# Patient Record
Sex: MALE | Race: OTHER | Employment: OTHER | URBAN - METROPOLITAN AREA
[De-identification: names, ages, dates, MRNs, and addresses within clinical notes are randomized per-mention and may not be internally consistent; named-entity substitution may affect disease eponyms.]

---

## 2017-01-16 ENCOUNTER — HOSPITAL ENCOUNTER (OUTPATIENT)
Dept: LAB | Age: 52
Discharge: HOME | End: 2017-01-16
Payer: COMMERCIAL

## 2017-01-16 DIAGNOSIS — E29.1 HYPOGONADISM MALE: ICD-10-CM

## 2017-01-16 DIAGNOSIS — E78.00 HIGH CHOLESTEROL: ICD-10-CM

## 2017-01-16 LAB
ALBUMIN SERPL BCP-MCNC: 4 G/DL (ref 3.4–5)
ALBUMIN/GLOB SERPL: 1.5 {RATIO} (ref 0.8–1.7)
ALP SERPL-CCNC: 55 U/L (ref 45–117)
ALT SERPL-CCNC: 32 U/L (ref 16–61)
ANION GAP BLD CALC-SCNC: 11 MMOL/L (ref 3–18)
AST SERPL W P-5'-P-CCNC: 19 U/L (ref 15–37)
BASOPHILS # BLD AUTO: 0.1 K/UL (ref 0–0.06)
BASOPHILS # BLD: 2 % (ref 0–2)
BILIRUB SERPL-MCNC: 0.6 MG/DL (ref 0.2–1)
BUN SERPL-MCNC: 16 MG/DL (ref 7–18)
BUN/CREAT SERPL: 18 (ref 12–20)
CALCIUM SERPL-MCNC: 8.7 MG/DL (ref 8.5–10.1)
CHLORIDE SERPL-SCNC: 105 MMOL/L (ref 100–108)
CHOLEST SERPL-MCNC: 190 MG/DL
CO2 SERPL-SCNC: 23 MMOL/L (ref 21–32)
CREAT SERPL-MCNC: 0.87 MG/DL (ref 0.6–1.3)
DIFFERENTIAL METHOD BLD: ABNORMAL
EOSINOPHIL # BLD: 0.4 K/UL (ref 0–0.4)
EOSINOPHIL NFR BLD: 5 % (ref 0–5)
ERYTHROCYTE [DISTWIDTH] IN BLOOD BY AUTOMATED COUNT: 12.8 % (ref 11.6–14.5)
GLOBULIN SER CALC-MCNC: 2.7 G/DL (ref 2–4)
GLUCOSE SERPL-MCNC: 88 MG/DL (ref 74–99)
HCT VFR BLD AUTO: 41.7 % (ref 36–48)
HDLC SERPL-MCNC: 62 MG/DL (ref 40–60)
HDLC SERPL: 3.1 {RATIO} (ref 0–5)
HGB BLD-MCNC: 13.9 G/DL (ref 13–16)
LDLC SERPL CALC-MCNC: 67.2 MG/DL (ref 0–100)
LIPID PROFILE,FLP: ABNORMAL
LYMPHOCYTES # BLD AUTO: 24 % (ref 21–52)
LYMPHOCYTES # BLD: 2 K/UL (ref 0.9–3.6)
MCH RBC QN AUTO: 30 PG (ref 24–34)
MCHC RBC AUTO-ENTMCNC: 33.3 G/DL (ref 31–37)
MCV RBC AUTO: 90.1 FL (ref 74–97)
MONOCYTES # BLD: 0.7 K/UL (ref 0.05–1.2)
MONOCYTES NFR BLD AUTO: 9 % (ref 3–10)
NEUTS SEG # BLD: 4.8 K/UL (ref 1.8–8)
NEUTS SEG NFR BLD AUTO: 60 % (ref 40–73)
PLATELET # BLD AUTO: 266 K/UL (ref 135–420)
PMV BLD AUTO: 11.5 FL (ref 9.2–11.8)
POTASSIUM SERPL-SCNC: 4.2 MMOL/L (ref 3.5–5.5)
PROT SERPL-MCNC: 6.7 G/DL (ref 6.4–8.2)
PSA SERPL-MCNC: 0.4 NG/ML (ref 0–4)
RBC # BLD AUTO: 4.63 M/UL (ref 4.7–5.5)
SODIUM SERPL-SCNC: 139 MMOL/L (ref 136–145)
TRIGL SERPL-MCNC: 304 MG/DL (ref ?–150)
VLDLC SERPL CALC-MCNC: 60.8 MG/DL
WBC # BLD AUTO: 8 K/UL (ref 4.6–13.2)

## 2017-01-16 PROCEDURE — 80061 LIPID PANEL: CPT | Performed by: INTERNAL MEDICINE

## 2017-01-16 PROCEDURE — 36415 COLL VENOUS BLD VENIPUNCTURE: CPT | Performed by: INTERNAL MEDICINE

## 2017-01-16 PROCEDURE — 84403 ASSAY OF TOTAL TESTOSTERONE: CPT | Performed by: INTERNAL MEDICINE

## 2017-01-16 PROCEDURE — 80053 COMPREHEN METABOLIC PANEL: CPT | Performed by: INTERNAL MEDICINE

## 2017-01-16 PROCEDURE — 85025 COMPLETE CBC W/AUTO DIFF WBC: CPT | Performed by: INTERNAL MEDICINE

## 2017-01-16 PROCEDURE — 84153 ASSAY OF PSA TOTAL: CPT | Performed by: INTERNAL MEDICINE

## 2017-01-17 LAB
COMMENT, TESC2: ABNORMAL
TESTOST SERPL-MCNC: 165 NG/DL (ref 348–1197)

## 2017-01-18 ENCOUNTER — OFFICE VISIT (OUTPATIENT)
Dept: INTERNAL MEDICINE CLINIC | Age: 52
End: 2017-01-18

## 2017-01-18 VITALS
HEART RATE: 96 BPM | DIASTOLIC BLOOD PRESSURE: 74 MMHG | OXYGEN SATURATION: 98 % | RESPIRATION RATE: 20 BRPM | HEIGHT: 77 IN | SYSTOLIC BLOOD PRESSURE: 142 MMHG | WEIGHT: 262 LBS | TEMPERATURE: 98 F | BODY MASS INDEX: 30.94 KG/M2

## 2017-01-18 DIAGNOSIS — E78.5 DYSLIPIDEMIA: Primary | ICD-10-CM

## 2017-01-18 DIAGNOSIS — R07.81 RIB PAIN: ICD-10-CM

## 2017-01-18 DIAGNOSIS — E29.1 HYPOGONADISM MALE: ICD-10-CM

## 2017-01-18 DIAGNOSIS — M54.2 NECK PAIN: ICD-10-CM

## 2017-01-18 DIAGNOSIS — F17.200 TOBACCO DEPENDENCE: ICD-10-CM

## 2017-01-18 RX ORDER — VARENICLINE TARTRATE 25 MG
KIT ORAL
Qty: 1 DOSE PACK | Refills: 0 | Status: SHIPPED | OUTPATIENT
Start: 2017-01-18 | End: 2019-03-14 | Stop reason: SDUPTHER

## 2017-01-18 NOTE — PROGRESS NOTES
Patient is in the office today for a 3 month follow up. Patient states he is having some back and rib cage pain. Do you have an Advance Directive no  Do you want more information declined    1. Have you been to the ER, urgent care clinic since your last visit? Hospitalized since your last visit? No    2. Have you seen or consulted any other health care providers outside of the [facility]\"" since your last visit? Include any pap smears or colon screening.  No
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
## 2017-01-18 NOTE — PATIENT INSTRUCTIONS

## 2017-01-18 NOTE — MR AVS SNAPSHOT
Visit Information Date & Time Provider Department Dept. Phone Encounter #  
 1/18/2017  8:15 AM Huber Bo MD [facility] [employer] [phone] Follow-up Instructions Return in about 3 months (around 4/18/2017) for labs 1 week before. Upcoming Health Maintenance Date Due DTaP/Tdap/Td series (2 - Td) 8/6/2015 INFLUENZA AGE 9 TO ADULT 8/1/2016 Pneumococcal 19-64 Medium Risk (1 of 1 - PPSV23) 1/31/2017* *Topic was postponed. The date shown is not the original due date. Allergies as of 1/18/2017  Review Complete On: 1/18/2017 By: Huber Bo MD  
 No Known Allergies Current Immunizations  Never Reviewed Name Date Influenza Vaccine Whole 8/6/2010 TDAP Vaccine 8/6/2005 Not reviewed this visit You Were Diagnosed With   
  
 Codes Comments Rib pain    -  Primary ICD-10-CM: R07.81 ICD-9-CM: 786.50 Neck pain     ICD-10-CM: M54.2 ICD-9-CM: 723.1 Tobacco dependence     ICD-10-CM: R83.731 ICD-9-CM: 305.1 Hypogonadism male     ICD-10-CM: E29.1 ICD-9-CM: 257.2 Vitals BP Pulse Temp Resp Height(growth percentile) Weight(growth percentile) 142/74 (BP 1 Location: Left arm, BP Patient Position: Sitting) 96 98 °F (36.7 °C) (Oral) 20 6' 5\" (1.956 m) 262 lb (118.8 kg) SpO2 BMI Smoking Status 98% 31.07 kg/m2 Current Every Day Smoker Vitals History BMI and BSA Data Body Mass Index Body Surface Area 31.07 kg/m 2 2.54 m 2 Preferred Pharmacy Pharmacy Name Phone [address], [address] [place] [address] [phone] Your Updated Medication List  
  
   
This list is accurate as of: 1/18/17  8:56 AM.  Always use your most recent med list.  
  
  
  
  
 NASACORT AQ 55 mcg nasal inhaler Generic drug:  triamcinolone 2 Sprays daily. omeprazole 40 mg capsule Commonly known as:  PRILOSEC  
take 1 capsule by mouth once daily * testosterone 4 mg/24 hr Pt24 Commonly known as:  ANDRODERM  
1 Patch by TransDERmal route daily. Max Daily Amount: 1 Patch. * testosterone 1 % (25 mg/2.5gram) Glpk Commonly known as:  ANDROGEL  
1 Packet by TransDERmal route daily. Max Daily Amount: 25 mg.  
  
 tiZANidine 4 mg tablet Commonly known as:  Barbie Perfect Take 1 Tab by mouth every six (6) hours as needed. varenicline 0.5 mg (11)- 1 mg (42) Dspk Commonly known as:  CHANTIX STARTER MARGA Per Dose pack instructions * Notice: This list has 2 medication(s) that are the same as other medications prescribed for you. Read the directions carefully, and ask your doctor or other care provider to review them with you. Prescriptions Sent to Pharmacy Refills  
 varenicline (CHANTIX STARTER MARGA) 0.5 mg (11)- 1 mg (42) DsPk 0 Sig: Per Dose pack instructions Class: Normal  
 Pharmacy: [facility] #: [phone] We Performed the Following REFERRAL TO PHYSICAL THERAPY [HON63 Custom] Follow-up Instructions Return in about 3 months (around 4/18/2017) for labs 1 week before. To-Do List   
 05/18/2017 Imaging:  XR RIBS BI 3 V   
  
 05/18/2017 Imaging:  XR SPINE CERV 4 OR 5 V Referral Information Referral ID Referred By Referred To  
  
 [id number] JONATHAN HORN Not Available Visits Status Start Date End Date 1 New Request 1/18/17 1/18/18 If your referral has a status of pending review or denied, additional information will be sent to support the outcome of this decision. Patient Instructions Heart-Healthy Diet: Care Instructions Your Care Instructions A heart-healthy diet has lots of vegetables, fruits, nuts, beans, and whole grains, and is low in salt. It limits foods that are high in saturated fat, such as meats, cheeses, and fried foods.  It may be hard to change your diet, but even small changes can lower your risk of heart attack and heart disease. Follow-up care is a key part of your treatment and safety. Be sure to make and go to all appointments, and call your doctor if you are having problems. It's also a good idea to know your test results and keep a list of the medicines you take. How can you care for yourself at home? Watch your portions · Learn what a serving is. A \"serving\" and a \"portion\" are not always the same thing. Make sure that you are not eating larger portions than are recommended. For example, a serving of pasta is ½ cup. A serving size of meat is 2 to 3 ounces. A 3-ounce serving is about the size of a deck of cards. Measure serving sizes until you are good at [place]" them. Keep in mind that restaurants often serve portions that are 2 or 3 times the size of one serving. · To keep your energy level up and keep you from feeling hungry, eat often but in smaller portions. · Eat only the number of calories you need to stay at a healthy weight. If you need to lose weight, eat fewer calories than your body burns (through exercise and other physical activity). Eat more fruits and vegetables · Eat a variety of fruit and vegetables every day. Dark green, deep orange, red, or yellow fruits and vegetables are especially good for you. Examples include spinach, carrots, peaches, and berries. · Keep carrots, celery, and other veggies handy for snacks. Buy fruit that is in season and store it where you can see it so that you will be tempted to eat it. · Cook dishes that have a lot of veggies in them, such as stir-fries and soups. Limit saturated and trans fat · Read food labels, and try to avoid saturated and trans fats. They increase your risk of heart disease. Trans fat is found in many processed foods such as cookies and crackers. · Use olive or canola oil when you cook. Try cholesterol-lowering spreads, such as Benecol or Take Control. · Bake, broil, grill, or steam foods instead of frying them. · Choose lean meats instead of high-fat meats such as hot dogs and sausages. Cut off all visible fat when you prepare meat. · Eat fish, skinless poultry, and meat alternatives such as soy products instead of high-fat meats. Soy products, such as tofu, may be especially good for your heart. · Choose low-fat or fat-free milk and dairy products. Eat fish · Eat at least two servings of fish a week. Certain fish, such as salmon and tuna, contain omega-3 fatty acids, which may help reduce your risk of heart attack. Eat foods high in fiber · Eat a variety of grain products every day. Include whole-grain foods that have lots of fiber and nutrients. Examples of whole-grain foods include oats, whole wheat bread, and brown rice. · Buy whole-grain breads and cereals, instead of white bread or pastries. Limit salt and sodium · Limit how much salt and sodium you eat to help lower your blood pressure. · Taste food before you salt it. Add only a little salt when you think you need it. With time, your taste buds will adjust to less salt. · Eat fewer snack items, fast foods, and other high-salt, processed foods. Check food labels for the amount of sodium in packaged foods. · Choose low-sodium versions of canned goods (such as soups, vegetables, and beans). Limit sugar · Limit drinks and foods with added sugar. These include candy, desserts, and soda pop. Limit alcohol · Limit alcohol to no more than 2 drinks a day for men and 1 drink a day for women. Too much alcohol can cause health problems. When should you call for help? Watch closely for changes in your health, and be sure to contact your doctor if: 
· You would like help planning heart-healthy meals. Where can you learn more? Go to http://deni-freddy.info/. Enter V137 in the search box to learn more about \"Heart-Healthy Diet: Care Instructions. \" 
 Current as of: January 27, 2016 Content Version: 11.1 © 3246-8234 [employer]. Care instructions adapted under license by [employer] (which disclaims liability or warranty for this information). If you have questions about a medical condition or this instruction, always ask your healthcare professional. [address] any warranty or liability for your use of this information. Introducing [facility]! Desiree Tera introduces Youjia patient portal. Now you can access parts of your medical record, email your doctor's office, and request medication refills online. 1. In your internet browser, go to [email or web address] 2. Click on the First Time User? Click Here link in the Sign In box. You will see the New Member Sign Up page. 3. Enter your Youjia Access Code exactly as it appears below. You will not need to use this code after youve completed the sign-up process. If you do not sign up before the expiration date, you must request a new code. · Youjia Access Code: [email or web address] Expires: 4/18/2017  8:28 AM 
 
4. Enter the last four digits of your Social Security Number (xxxx) and Date of Birth (mm/dd/yyyy) as indicated and click Submit. You will be taken to the next sign-up page. 5. Create a Youjia ID. This will be your Youjia login ID and cannot be changed, so think of one that is secure and easy to remember. 6. Create a Youjia password. You can change your password at any time. 7. Enter your Password Reset Question and Answer. This can be used at a later time if you forget your password. 8. Enter your e-mail address. You will receive e-mail notification when new information is available in [address] 9. Click Sign Up. You can now view and download portions of your medical record. 10. Click the Download Summary menu link to download a portable copy of your medical information. If you have questions, please visit the Frequently Asked Questions section of the [email or web address] website. Remember, Wayin is NOT to be used for urgent needs. For medical emergencies, dial 911. Now available from your iPhone and Android! Please provide this summary of care documentation to your next provider. Your primary care clinician is listed as JONATHAN HORN. If you have any questions after today's visit, please call [phone].

## 2017-01-27 ENCOUNTER — APPOINTMENT (OUTPATIENT)
Dept: PHYSICAL THERAPY | Age: 52
End: 2017-01-27

## 2017-01-31 ENCOUNTER — HOSPITAL ENCOUNTER (OUTPATIENT)
Dept: PHYSICAL THERAPY | Age: 52
Discharge: HOME | End: 2017-01-31
Payer: COMMERCIAL

## 2017-01-31 PROCEDURE — 97162 PT EVAL MOD COMPLEX 30 MIN: CPT

## 2017-01-31 PROCEDURE — 97110 THERAPEUTIC EXERCISES: CPT

## 2017-01-31 PROCEDURE — 97140 MANUAL THERAPY 1/> REGIONS: CPT

## 2017-01-31 NOTE — PROGRESS NOTES
[facility]  [address] [facility], [address] [address],[place]  [phone] fax    Plan of Care/ Statement of Necessity for Physical Therapy Services    Patient name: Dayami Enciso Start of Care: 2017   Referral source: Braydon Lora MD : [id number]    Medical Diagnosis: Cervicalgia [M54.2]   Onset Date:Approx 4 months    Treatment Diagnosis: decrease tolerance to ADLS and activities due to Neck pain B and intermittent B UE S/S, STC   Prior Hospitalization: see medical history Provider#: [id number]   Medications: Verified on Patient summary List    Comorbidities: none   Prior Level of Function: I all areas, no AD use, 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 Assessment/ key information: 55 YO male diagnosed as above and with S/S consistent with above diagnosis presents to skilled outpatient PT. CCO Neck pain, back pain , and rib pain. States something feels like it is coming from between the shoulder blades and radiates around the rib cage to his sternum. He notes the neck pain feels like it is at the base of the neck and goes up to the base of his skull. At times he notes he has S/S down the arms L>R and no particular pattern. Insideous onset reported approx 4 months ago with gradual onset and he notes a history of recurrent episodic pain that now is not going away like it used to. Previous Treatment/Compliance: none for this  Pain 3, FOTO 70 , Csp AROM FF 38   BB 35    SB B 35 and ROT R/L 42/36. Pain and tightness with full Csp retraction. AROM B UE full overhead , note FH and RS with posture, slight decrease R shoulder height. Moderate STC L>R UT and medial ST region. Distraction BAR and Compression No Better no worse.     Patient demonstrates the potential to make gains with improved ROM, strength, endurance/activity tolerance, functional FOTO survey score and all within a reasonable time frame so as to increase their functional independence with ADLs and activities for carryover to Improved quality of life, tolerance to work demands, household chores and yard work. Patient requires skilled Physical Therapy so as to monitor their response to and modify their treatment plan accordingly. Patient appears to be an appropriate candidate for skilled outpatient Physical Therapy.     Evaluation Complexity History HIGH Complexity :3+ comorbidities / personal factors will impact the outcome/ POC ; Examination HIGH Complexity : 4+ Standardized tests and measures addressing body structure, function, activity limitation and / or participation in recreation  ;Presentation MEDIUM Complexity : Evolving with changing characteristics  ; Clinical Decision Making MEDIUM Complexity : FOTO score of 26-74  Overall Complexity Rating: MEDIUM  Problem List: pain affecting function, decrease ROM, decrease ADL/ functional abilitiies, decrease activity tolerance, decrease flexibility/ joint mobility and other FOTO 70   Treatment Plan may include any combination of the following: Therapeutic exercise, Therapeutic activities, Neuromuscular re-education, Physical agent/modality, Manual therapy and Patient education  Patient / Family readiness to learn indicated by: asking questions, trying to perform skills and interest  Persons(s) to be included in education: patient (P)  Barriers to Learning/Limitations: None  Patient Goal (s): pain free   Patient Self Reported Health Status: good  Rehabilitation Potential: good    Short Term Goals:  To be accomplished in 5 treatments:   1 patient will have established and be independent with HEP to aid with progression of skilled PT program   EVAL issued   CURRENT   2 patient will have pain <2/10 to aid with increase tolerance to ADLS and activities   EVAL 3   CURRENT   3 patient will have FOTO improved to 73 to show increase tolerance to ADLs and activities   EVAL 70   CURRENT  Long Term Goals: To be accomplished in 10 treatments:    1 patient will have pain <1/10 to aid with increase tolerance to ADLS and activities   EVAL 3   CURRENT   2 patient will have FOTO improved to 76 to show increase tolerance to ADLs and activities   EVAL 70   CURRENT   3 patient will report 50% improvement overall to aid with increase tolerance to ADLs and activities   EVAL   CURRENT   4 Patient will have SB B 35-40 degrees with less pain to aid with increase tolerance to ADLs   EVAL B SB 35 with pain reported   CURRENT  Frequency / Duration: Patient to be seen 2-3 times per week for 10 treatments. Patient/ Caregiver education and instruction: Diagnosis, prognosis, self care, activity modification and exercises   [x]  Plan of care has been reviewed with YUDY Torres, PT 1/31/2017 2:49 PM  ________________________________________________________________________    I certify that the above Therapy Services are being furnished while the patient is under my care. I agree with the treatment plan and certify that this therapy is necessary.     [de-identified] Signature:____________________  Date:____________Time: _________    Please sign and return to [facility] - [facility]    [address] [facility], [address] [place] [address],[facility] [address]  [phone] fax

## 2017-01-31 NOTE — PROGRESS NOTES
PT DAILY TREATMENT NOTE/CERVICAL EVAL3-16    Patient Name: Barbara Figueroa  Date:2017  : 1965  [x]  Patient  Verified  Payor: BLUE CROSS / Plan: [employer] [facility] / Product Type: PPO /    In time:206  Out time:246  Total Treatment Time (min): 40  Total Timed Codes (min): 18  1:1 Treatment Time ( only): 18  Visit #: 1 of 10    Treatment Area: Cervicalgia [M54.2]    SUBJECTIVE  Pain Level (0-10 scale): 3  [x]constant []intermittent [x]improving [x]worsening []no change since onset  Better with nothing, Worse with moving the neck from side to side  Any medication changes, allergies to medications, adverse drug reactions, diagnosis change, or new procedure performed?: [x] No    [] Yes (see summary sheet for update)  Subjective functional status/changes:     PLOF: I   Limitations to PLOF: nothing  Mechanism of Injury: insideous onset   Current symptoms/Complaints: 53 YO male diagnosed as above and with S/S consistent with above diagnosis presents to skilled outpatient PT. CCO Neck pain, back pain , and rib pain. States something feels like it is coming from between the shoulder blades and radiates around the rib cage to his sternum. He notes the neck pain feels like it is at the base of the neck and goes up to the base of his skull. At times he notes he has S/S down the arms L>R and no particular pattern. Insideous onset reported approx 4 months ago with gradual onset and he notes a history of recurrent episodic pain that now is not going away like it used to. Previous Treatment/Compliance: none for this  PMHx/Surgical Hx: None reported with exception of a fall through a roof in his 20s fracturing his sternum.   Work Hx: full time self employed   Living Situation:1 story house , 4 steps to enter , rails, not living alone  Pt Goals: painfree  Barriers: [x]pain []financial []time []transportation []other  Motivation: high  Substance use: []Alcohol [x]Tobacco []other:   FABQ Score: []low []elevate  Cognition: A & O x4    Other:    OBJECTIVE/EXAMINATION  Domestic Life: works full time, needs to tolerate household and yard work, coaching for baseball  Activity/Recreational Limitations: pain   Mobility: I no AD use  Self Care: I all areas and no Assistance needed        Modality rationale: decrease pain and increase tissue extensibility to improve the patients ability to aid with increase tolerance to ADLS and activities   Min Type Additional Details    [] Estim:  []Unatt       []IFC  []Premod                        []Other:  []w/ice   []w/heat  Position:  Location:    [] Estim: []Att    []TENS instruct  []NMES                    []Other:  []w/US   []w/ice   []w/heat  Position:  Location:     [x]  Traction: [] Cervical       []Lumbar                       [] Prone          []Supine                       []Intermittent   []Continuous Lbs:  [] before manual  [] after manual    []  Ultrasound: []Continuous   [] Pulsed                           []1MHz   []3MHz Location:  W/cm2:    []  Iontophoresis with dexamethasone         Location: [] Take home patch   [] In clinic    []  Ice     []  heat  []  Ice massage  []  Laser   []  Anodyne Position:  Location:    []  Laser with stim  []  Other: Position:  Location:    []  Vasopneumatic Device Pressure:       [] lo [] med [] hi   Temperature: [] lo [] med [] hi   [] Skin assessment post-treatment:  []intact []redness- no adverse reaction    []redness - adverse reaction:     22 min [x]Eval                  []Re-Eval       8 min Therapeutic Exercise:  [] See flow sheet :   Rationale: increase ROM and increase strength to improve the patients ability to aid with increase tolerance to ADLS and activities     min Therapeutic Activity:  []  See flow sheet :   Rationale:   to improve the patients ability to       min Neuromuscular Re-education:  []  See flow sheet :   Rationale:   to improve the patients ability to     10 min Manual Therapy:  CTX supine with SOR Csp PROM   Rationale: decrease pain, increase ROM, increase tissue extensibility and decrease trigger points to aid with increase tolerance to ADLS and activities     min Gait Training:  ___ feet with ___ device on level surfaces with ___ level of assist   Rationale: With   [] TE   [] TA   [] neuro   [] other: Patient Education: [x] Review HEP    [] Progressed/Changed HEP based on:   [] positioning   [] body mechanics   [] transfers   [] heat/ice application    [] other:      Other Objective/Functional Measures:      Physical Therapy Evaluation Cervical Spine     SUBJECTIVE  Chief Complaint:    Mechanism of injury:    Symptoms  Aggravated by:   [] Bending [] Sitting [] Standing [] Reaching Overhead   [] Moving [] Cough [] Sneeze [] Eating   [] AM  [] PM  Lying:  [] sup   [] pro   [] sidelying   [] Other:     Eased by:    [] Bending [] Sitting [] Standing Lying: [] sup  [] pro  [] sidelying   [] Moving [] AM  [] PM  [] Other:     General Health:  Red Flags Indicated? [] Yes    [x] No  [] Yes [] No Recent weight change (If yes, due to dieting? [] Yes  [] No)   [] Yes [] No Persistent cough  [] Yes [] No Unremitting pain at night  [] Yes [] No Dizziness  [] Yes [] No Blurred vision  [] Yes [] No Hands more cold or painful in cold weather  [] Yes [] No Ringing in ears  [] Yes [] No Difficulty swallowing  [] Yes [] No Dysfunction of bowel or bladder  [] Yes [] No Recent illness within past 3 weeks (i.e, cold, flu)  [] Yes [] No Jaw pain    Past History/Treatments:    Diagnostic Tests: [] Lab work [x] X-rays  Pending   [] CT [] MRI     [] Other:  Results:    Functional Status  Prior level of function:  Present functional limitations:  What position do you sleep in?:    Headaches: Do you have headaches? [] Yes   [x] No  How often do you get headaches? How long does the headache last?  What aggravates it? What relieves it?   Does the headache coincide with any other symptoms (visual disturbances, light sensitivity)? Where is the headache? Does it change locations?   Other:    OBJECTIVE  Posture: [] WNL  Head Position:    FH, RS  Shoulder/Scapular Position:  C-Kyphosis:  [] increased   [] decreased   C-Lordosis:   [] increased   [] decreased  T-Kyphosis:  [] increased   [] decreased  T-Lordosis:   [] increased   [] decreased     TMJ: [x] N/A [] Abnormal - ROM:   Palpation:    Cervical Retraction: [] WNL    [x] Abnormal:able to perform with reports of pain/tightness at the base of the spine    Shoulder/Scapular Screen: [] WNL    [] Abnormal:    Active Movements: [] N/A   [] Too acute   [] Other:  ROM   AROM  degrees % PROM Comments:pain, area   Forward flexion 38     Extension 35     SB right 35     SB left  35     Rotation right 42     Rotation left 36       Thoracic Spine: [x] N/A    [] WNL   [] Other:    PROM:    Palpation:  [] Min  [x] Mod  [] Severe    Location:  STC L>R UT and medial ST region  [] Min  [] Mod  [] Severe    Location:  [] Min  [] Mod  [] Severe    Location:    Neuro Screen (myotome/dematome/felexes): [] WNL  Myotome Level Muscle Test Myotome Level Muscle Test   C5 Shoulder Adduction - Deltoid C8 Finger Flexors   C6 Wrist Extension T1 Finger Abduction - Interossei   C7 Elbow Extension     Comments:  Upper Limb Tension Tests: [] N/A       Ulnar: [] R    [] L    [] +    [] -       Median: [] R    [] L    [] +    [] -       Radial: [] R    [] L    [] +    [] -    Special Tests:  Cervical:        Vertebral Artery:  [] R    [] L    [] +    [] -       Alar Ligament: [] R    [] L    [] +    [] -       Transverse Lig: [] R    [] L    [] +    [] -       Spurling's:  [] R    [] L    [] +    [] -       Distraction: [] R    [] L    [] +    [] -   BAR       Compression: [] R    [] L    [] +    [] -   No worse, but a \"weird feeling like my equilibrium is off\"     Thoracic Outlet Tests: [] N/A       Adson's:  [] R    [] L    [] +    [] -       Hyperabduction: [] R    [] L    [] +    [] -       Vincent's:  [] R [] L    [] +    [] -       Shantelaumarcus Luke:  [] R    [] L    [] +    [] -    Diaphragmatic Breathing: [] Normal    [] Abnormal    Muscle Flexibility: [] N/A   Scalenes: [] WNL    [] Tight    [] R    [] L   Upper Trap: [] WNL    [] Tight    [] R    [] L   Levator: [] WNL    [] Tight    [] R    [] L   Pect. Minor: [] WNL    [] Tight    [] R    [] L    Global Muscular Weakness: [] N/A   Lower Trap:   Rhomboids:   Middle Trap:   Serratus Ant:   Ext Rotators: Other:    Other tests/comments:       Pain Level (0-10 scale) post treatment: 3    ASSESSMENT/Changes in Function: Patient demonstrates the potential to make gains with improved ROM, strength, endurance/activity tolerance, functional FOTO survey score  and all within a reasonable time frame so as to increase their functional independence with ADLs and activities for carryover to  Improved quality of life, tolerance to work demands, household chores and yard work. Patient requires skilled Physical Therapy so as to monitor their response to and modify their treatment plan accordingly. Patient appears to be an appropriate candidate for skilled outpatient Physical Therapy. Patient will continue to benefit from skilled PT services to modify and progress therapeutic interventions, address functional mobility deficits, address ROM deficits, address strength deficits, analyze and address soft tissue restrictions, analyze and cue movement patterns, analyze and modify body mechanics/ergonomics, assess and modify postural abnormalities and instruct in home and community integration to attain remaining goals.      [x]  See Plan of Care  []  See progress note/recertification  []  See Discharge Summary         Progress towards goals / Updated goals:       PLAN  [x]  Upgrade activities as tolerated     [x]  Continue plan of care  []  Update interventions per flow sheet       []  Discharge due to:_  []  Other:_      Destiny Medrano, PT 1/31/2017  2:07 PM

## 2017-02-06 ENCOUNTER — HOSPITAL ENCOUNTER (OUTPATIENT)
Dept: PHYSICAL THERAPY | Age: 52
Discharge: HOME | End: 2017-02-06
Payer: COMMERCIAL

## 2017-02-06 PROCEDURE — 97140 MANUAL THERAPY 1/> REGIONS: CPT

## 2017-02-06 PROCEDURE — 97110 THERAPEUTIC EXERCISES: CPT

## 2017-02-06 NOTE — PROGRESS NOTES
PT DAILY TREATMENT NOTE     Patient Name: Sandor Phoenix  Date:2017  : 1965  [x]  Patient  Verified  Payor: BLUE CROSS / Plan: [employer] [facility] / Product Type: PPO /    In time:230  Out time:322  Total Treatment Time (min): 52  Visit #: 2 of 10    Treatment Area: Cervicalgia [M54.2]    SUBJECTIVE  Pain Level (0-10 scale): 0  Any medication changes, allergies to medications, adverse drug reactions, diagnosis change, or new procedure performed?: [x] No    [] Yes (see summary sheet for update)  Subjective functional status/changes:   [] No changes reported  Doing better.     OBJECTIVE    Modality rationale: increase tissue extensibility to improve the patients ability to aid with increase tolerance to ADLS and activities   Min Type Additional Details    [] Estim:  []Unatt       []IFC  []Premod                        []Other:  []w/ice   []w/heat  Position:  Location:    [] Estim: []Att    []TENS instruct  []NMES                    []Other:  []w/US   []w/ice   []w/heat  Position:  Location:    []  Traction: [] Cervical       []Lumbar                       [] Prone          []Supine                       []Intermittent   []Continuous Lbs:  [] before manual  [] after manual    []  Ultrasound: []Continuous   [] Pulsed                           []1MHz   []3MHz W/cm2:  Location:    []  Iontophoresis with dexamethasone         Location: [] Take home patch   [] In clinic   10 [x]  Ice  post   []  heat  []  Ice massage  []  Laser   []  Anodyne Position:supine  Location:Csp    []  Laser with stim  []  Other:  Position:  Location:    []  Vasopneumatic Device Pressure:       [] lo [] med [] hi   Temperature: [] lo [] med [] hi   [] Skin assessment post-treatment:  []intact []redness- no adverse reaction    []redness - adverse reaction:       min []Eval                  []Re-Eval       27 min Therapeutic Exercise:  [] See flow sheet :   Rationale: increase ROM, increase strength and improve coordination to improve the patients ability to aid with increase tolerance to ADLs and activities     min Therapeutic Activity:  []  See flow sheet :   Rationale:   to improve the patients ability to       min Neuromuscular Re-education:  []  See flow sheet :   Rationale:   to improve the patients ability to     15 min Manual Therapy:  CTX SOR  Csp PROM all planes, passive UT and LI stretches in supine   Rationale: decrease pain, increase ROM, increase tissue extensibility and decrease trigger points to aid with increase tolerance to ADLs      min Gait Training:  ___ feet with ___ device on level surfaces with ___ level of assist   Rationale: With   [] TE   [] TA   [] neuro   [] other: Patient Education: [x] Review HEP    [] Progressed/Changed HEP based on:   [] positioning   [] body mechanics   [] transfers   [] heat/ice application    [] other:      Other Objective/Functional Measures: VC all exercises and techniques    Pain Level (0-10 scale) post treatment:0    ASSESSMENT/Changes in Function: first full day back and tolerated well. Patient will continue to benefit from skilled PT services to modify and progress therapeutic interventions, address functional mobility deficits, address ROM deficits, address strength deficits, analyze and address soft tissue restrictions, analyze and cue movement patterns, analyze and modify body mechanics/ergonomics, assess and modify postural abnormalities and instruct in home and community integration to attain remaining goals. [x]  See Plan of Care  []  See progress note/recertification  []  See Discharge Summary         Progress towards goals / Updated goals:  Short Term Goals:  To be accomplished in 5 treatments:  1 patient will have established and be independent with HEP to aid with progression of skilled PT program  EVAL issued  CURRENT progressing 2/6/17  2 patient will have pain <2/10 to aid with increase tolerance to ADLS and activities  EVAL 3  CURRENT 0 2/6/17  3 patient will have FOTO improved to 73 to show increase tolerance to ADLs and activities  EVAL 70  CURRENT  Long Term Goals:  To be accomplished in 10 treatments:  1 patient will have pain <1/10 to aid with increase tolerance to ADLS and activities  EVAL 3  CURRENT 0 2/6/17  2 patient will have FOTO improved to 76 to show increase tolerance to ADLs and activities  EVAL 70  CURRENT  3 patient will report 50% improvement overall to aid with increase tolerance to ADLs and activities  EVAL  CURRENT  4 Patient will have SB B 35-40 degrees with less pain to aid with increase tolerance to ADLs  EVAL B SB 35 with pain reported  CURRENT    PLAN  [x]  Upgrade activities as tolerated     [x]  Continue plan of care  []  Update interventions per flow sheet       []  Discharge due to:_  []  Other:_      Keely Lozada PT 2/6/2017  2:26 PM    Future Appointments  Date Time Provider Sandra Garcia   2/6/2017 2:30 PM Keely Lozada PT [facility]   2/9/2017 12:00 PM Sayra Perdomo PTA [facility]   4/11/2017 8:05 AM MD LAINE Hernandez None   4/18/2017 8:30 AM MD LAINE Hernandez None

## 2017-03-28 NOTE — PROGRESS NOTES
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 He had residual pain 0. He had exercises for her HEP. He should follow up with his MD as needed. Thank you. Venus Malone will have established and be independent with HEP to aid with progression of skilled PT program  Status at last note/certification:eval  Status at discharge: met    Goal:patient will have pain <2/10 to aid with increase tolerance to ADLS and activities  Status at last note/certification:eval  Status at discharge: met    Venus Malone will have FOTO improved to 73 to show increase tolerance to ADLs and activities  Status at last note/certification:eval  Status at discharge: not met, not reassessed. Goal:patient will have pain <1/10 to aid with increase tolerance to ADLS and activities  Status at last note/certification:eval  Status at discharge: met      ASSESSMENT/RECOMMENDATIONS:  []Discontinue therapy progressing towards or have reached established goals  []Discontinue therapy due to lack of appreciable progress towards goals  [x]Discontinue therapy due to lack of attendance or compliance  [x]Other:patient was busy with family things at last point of contact. He did not return for any additional sessions.   Thank you for this referral.     Soy Dodge, PT 3/28/2017 4:14 PM

## 2017-06-06 ENCOUNTER — HOSPITAL ENCOUNTER (OUTPATIENT)
Dept: LAB | Age: 52
Discharge: HOME | End: 2017-06-06
Payer: COMMERCIAL

## 2017-06-06 DIAGNOSIS — E29.1 HYPOGONADISM MALE: ICD-10-CM

## 2017-06-06 DIAGNOSIS — E78.5 DYSLIPIDEMIA: ICD-10-CM

## 2017-06-06 LAB
ALBUMIN SERPL BCP-MCNC: 4.1 G/DL (ref 3.4–5)
ALBUMIN/GLOB SERPL: 1.4 {RATIO} (ref 0.8–1.7)
ALP SERPL-CCNC: 47 U/L (ref 45–117)
ALT SERPL-CCNC: 50 U/L (ref 16–61)
ANION GAP BLD CALC-SCNC: 10 MMOL/L (ref 3–18)
AST SERPL W P-5'-P-CCNC: 23 U/L (ref 15–37)
BILIRUB SERPL-MCNC: 0.4 MG/DL (ref 0.2–1)
BUN SERPL-MCNC: 16 MG/DL (ref 7–18)
BUN/CREAT SERPL: 18 (ref 12–20)
CALCIUM SERPL-MCNC: 9 MG/DL (ref 8.5–10.1)
CHLORIDE SERPL-SCNC: 102 MMOL/L (ref 100–108)
CHOLEST SERPL-MCNC: 189 MG/DL
CO2 SERPL-SCNC: 25 MMOL/L (ref 21–32)
CREAT SERPL-MCNC: 0.87 MG/DL (ref 0.6–1.3)
GLOBULIN SER CALC-MCNC: 2.9 G/DL (ref 2–4)
GLUCOSE SERPL-MCNC: 85 MG/DL (ref 74–99)
HDLC SERPL-MCNC: 55 MG/DL (ref 40–60)
HDLC SERPL: 3.4 {RATIO} (ref 0–5)
LDLC SERPL CALC-MCNC: 61.4 MG/DL (ref 0–100)
LIPID PROFILE,FLP: ABNORMAL
POTASSIUM SERPL-SCNC: 4.2 MMOL/L (ref 3.5–5.5)
PROT SERPL-MCNC: 7 G/DL (ref 6.4–8.2)
SODIUM SERPL-SCNC: 137 MMOL/L (ref 136–145)
TRIGL SERPL-MCNC: 363 MG/DL (ref ?–150)
VLDLC SERPL CALC-MCNC: 72.6 MG/DL

## 2017-06-06 PROCEDURE — 36415 COLL VENOUS BLD VENIPUNCTURE: CPT | Performed by: INTERNAL MEDICINE

## 2017-06-06 PROCEDURE — 80061 LIPID PANEL: CPT | Performed by: INTERNAL MEDICINE

## 2017-06-06 PROCEDURE — 84403 ASSAY OF TOTAL TESTOSTERONE: CPT | Performed by: INTERNAL MEDICINE

## 2017-06-06 PROCEDURE — 80053 COMPREHEN METABOLIC PANEL: CPT | Performed by: INTERNAL MEDICINE

## 2017-06-07 LAB
COMMENT, TESC2: ABNORMAL
TESTOST SERPL-MCNC: 155 NG/DL (ref 348–1197)

## 2017-06-13 ENCOUNTER — TELEPHONE (OUTPATIENT)
Dept: INTERNAL MEDICINE CLINIC | Age: 52
End: 2017-06-13

## 2017-06-13 ENCOUNTER — OFFICE VISIT (OUTPATIENT)
Dept: INTERNAL MEDICINE CLINIC | Age: 52
End: 2017-06-13

## 2017-06-13 VITALS
TEMPERATURE: 97.9 F | BODY MASS INDEX: 30.58 KG/M2 | DIASTOLIC BLOOD PRESSURE: 75 MMHG | RESPIRATION RATE: 20 BRPM | SYSTOLIC BLOOD PRESSURE: 117 MMHG | OXYGEN SATURATION: 97 % | HEIGHT: 77 IN | HEART RATE: 49 BPM | WEIGHT: 259 LBS

## 2017-06-13 DIAGNOSIS — K21.9 GASTROESOPHAGEAL REFLUX DISEASE WITHOUT ESOPHAGITIS: ICD-10-CM

## 2017-06-13 DIAGNOSIS — E78.2 MIXED HYPERLIPIDEMIA: ICD-10-CM

## 2017-06-13 DIAGNOSIS — E29.1 HYPOGONADISM MALE: Primary | ICD-10-CM

## 2017-06-13 RX ORDER — OMEPRAZOLE 40 MG/1
CAPSULE, DELAYED RELEASE ORAL
Qty: 90 CAP | Refills: 2 | Status: SHIPPED | OUTPATIENT
Start: 2017-06-13 | End: 2018-04-01 | Stop reason: SDUPTHER

## 2017-06-13 RX ORDER — TESTOSTERONE 10 MG/.5G
2 GEL, METERED TOPICAL DAILY
Qty: 60 G | Refills: 1 | Status: SHIPPED | OUTPATIENT
Start: 2017-06-13 | End: 2017-10-30 | Stop reason: SDUPTHER

## 2017-06-13 NOTE — MR AVS SNAPSHOT
Visit Information Date & Time Provider Department Dept. Phone Encounter #  
 6/13/2017  8:45 AM Maya Swanson MD [facility] [phone] Follow-up Instructions Return in about 4 months (around 10/13/2017) for labs 1 week before. Upcoming Health Maintenance Date Due Pneumococcal 19-64 Medium Risk (1 of 1 - PPSV23) 11/2/1986 DTaP/Tdap/Td series (2 - Td) 8/6/2015 INFLUENZA AGE 9 TO ADULT 8/1/2017 Allergies as of 6/13/2017  Review Complete On: 6/13/2017 By: Maya Swanson MD  
 No Known Allergies Current Immunizations  Never Reviewed Name Date Influenza Vaccine Whole 8/6/2010 TDAP Vaccine 8/6/2005 Not reviewed this visit You Were Diagnosed With   
  
 Codes Comments Hypogonadism male    -  Primary ICD-10-CM: E29.1 ICD-9-CM: 257.2 Mixed hyperlipidemia     ICD-10-CM: E78.2 ICD-9-CM: 272.2 Vitals BP Pulse Temp Resp Height(growth percentile) Weight(growth percentile) 117/75 (BP 1 Location: Left arm, BP Patient Position: Sitting) (!) 49 97.9 °F (36.6 °C) (Oral) 20 6' 5\" (1.956 m) 259 lb (117.5 kg) SpO2 BMI Smoking Status 97% 30.71 kg/m2 Current Every Day Smoker Vitals History BMI and BSA Data Body Mass Index Body Surface Area 30.71 kg/m 2 2.53 m 2 Preferred Pharmacy Pharmacy Name Phone [address], [address] [place] [address] [phone] Your Updated Medication List  
  
   
This list is accurate as of: 6/13/17  9:36 AM.  Always use your most recent med list.  
  
  
  
  
 NASACORT AQ 55 mcg nasal inhaler Generic drug:  triamcinolone 2 Sprays daily. omeprazole 40 mg capsule Commonly known as:  PRILOSEC  
take 1 capsule by mouth once daily  
  
 testosterone 10 mg/0.5 gram /actuation Glpm  
Commonly known as:  FORTESTA  
2 Pump(s) by TransDERmal route daily. Max Daily Amount: 2 Pump(s). tiZANidine 4 mg tablet Commonly known as:  Consuelo Rye Take 1 Tab by mouth every six (6) hours as needed. varenicline 0.5 mg (11)- 1 mg (42) Dspk Commonly known as:  CHANTIX STARTER MARGA Per Dose pack instructions Prescriptions Printed Refills  
 testosterone (FORTESTA) 10 mg/0.5 gram /actuation glpm 1 Si Pump(s) by TransDERmal route daily. Max Daily Amount: 2 Pump(s). Class: Print Route: TransDERmal  
  
Prescriptions Sent to Pharmacy Refills  
 omeprazole (PRILOSEC) 40 mg capsule 2 Sig: take 1 capsule by mouth once daily Class: Normal  
 Pharmacy: [facility], [address] [place] [address] #: [phone] Follow-up Instructions Return in about 4 months (around 10/13/2017) for labs 1 week before. Patient Instructions A Healthy Lifestyle: Care Instructions Your Care Instructions A healthy lifestyle can help you feel good, stay at a healthy weight, and have plenty of energy for both work and play. A healthy lifestyle is something you can share with your whole family. A healthy lifestyle also can lower your risk for serious health problems, such as high blood pressure, heart disease, and diabetes. You can follow a few steps listed below to improve your health and the health of your family. Follow-up care is a key part of your treatment and safety. Be sure to make and go to all appointments, and call your doctor if you are having problems. Its also a good idea to know your test results and keep a list of the medicines you take. How can you care for yourself at home? · Do not eat too much sugar, fat, or fast foods. You can still have dessert and treats now and then. The goal is moderation. · Start small to improve your eating habits. Pay attention to portion sizes, drink less juice and soda pop, and eat more fruits and vegetables. ¨ Eat a healthy amount of food. A 3-ounce serving of meat, for example, is about the size of a deck of cards. Fill the rest of your plate with vegetables and whole grains. ¨ Limit the amount of soda and sports drinks you have every day. Drink more water when you are thirsty. ¨ Eat at least 5 servings of fruits and vegetables every day. It may seem like a lot, but it is not hard to reach this goal. A serving or helping is 1 piece of fruit, 1 cup of vegetables, or 2 cups of leafy, raw vegetables. Have an apple or some carrot sticks as an afternoon snack instead of a candy bar. Try to have fruits and/or vegetables at every meal. 
· Make exercise part of your daily routine. You may want to start with simple activities, such as walking, bicycling, or slow swimming. Try to be active 30 to 60 minutes every day. You do not need to do all 30 to 60 minutes all at once. For example, you can exercise 3 times a day for 10 or 20 minutes. Moderate exercise is safe for most people, but it is always a good idea to talk to your doctor before starting an exercise program. 
· Keep moving. Yonas Dee the lawn, work in the garden, or [place]. Take the stairs instead of the elevator at work. · If you smoke, quit. People who smoke have an increased risk for heart attack, stroke, cancer, and other lung illnesses. Quitting is hard, but there are ways to boost your chance of quitting tobacco for good. ¨ Use nicotine gum, patches, or lozenges. ¨ Ask your doctor about stop-smoking programs and medicines. ¨ Keep trying. In addition to reducing your risk of diseases in the future, you will notice some benefits soon after you stop using tobacco. If you have shortness of breath or asthma symptoms, they will likely get better within a few weeks after you quit. · Limit how much alcohol you drink. Moderate amounts of alcohol (up to 2 drinks a day for men, 1 drink a day for women) are okay.  But drinking too much can lead to liver problems, high blood pressure, and other health problems. Family health If you have a family, there are many things you can do together to improve your health. · Eat meals together as a family as often as possible. · Eat healthy foods. This includes fruits, vegetables, lean meats and dairy, and whole grains. · Include your family in your fitness plan. Most people think of activities such as jogging or tennis as the way to fitness, but there are many ways you and your family can be more active. Anything that makes you breathe hard and gets your heart pumping is exercise. Here are some tips: 
¨ Walk to do errands or to take your child to school or the bus. ¨ Go for a family bike ride after dinner instead of watching TV. Where can you learn more? Go to http://deni-freddy.info/. Enter E827 in the search box to learn more about \"A Healthy Lifestyle: Care Instructions. \" Current as of: July 26, 2016 Content Version: 11.2 © 2040-0029 Nextance. Care instructions adapted under license by [employer] (which disclaims liability or warranty for this information). If you have questions about a medical condition or this instruction, always ask your healthcare professional. [place] [address] any warranty or liability for your use of this information. Introducing [facility]! [facility] introduces Biodesy patient portal. Now you can access parts of your medical record, email your doctor's office, and request medication refills online. 1. In your internet browser, go to [email or web address] 2. Click on the First Time User? Click Here link in the Sign In box. You will see the New Member Sign Up page. 3. Enter your Biodesy Access Code exactly as it appears below. You will not need to use this code after youve completed the sign-up process.  If you do not sign up before the expiration date, you must request a new code. · UMicIt Access Code: [email or web address] Expires: 9/11/2017  9:03 AM 
 
4. Enter the last four digits of your Social Security Number (xxxx) and Date of Birth (mm/dd/yyyy) as indicated and click Submit. You will be taken to the next sign-up page. 5. Create a UMicIt ID. This will be your UMicIt login ID and cannot be changed, so think of one that is secure and easy to remember. 6. Create a UMicIt password. You can change your password at any time. 7. Enter your Password Reset Question and Answer. This can be used at a later time if you forget your password. 8. Enter your e-mail address. You will receive e-mail notification when new information is available in [address] 9. Click Sign Up. You can now view and download portions of your medical record. 10. Click the Download Summary menu link to download a portable copy of your medical information. If you have questions, please visit the Frequently Asked Questions section of the UMicIt website. Remember, UMicIt is NOT to be used for urgent needs. For medical emergencies, dial 911. Now available from your iPhone and Android! Please provide this summary of care documentation to your next provider. Your primary care clinician is listed as JONATHAN HORN. If you have any questions after today's visit, please call [phone].

## 2017-06-13 NOTE — TELEPHONE ENCOUNTER
Pt calling per [place], [place] needed for:    testosterone (FORTESTA) 10 mg/0.5 gram /actuation glpm

## 2017-06-13 NOTE — PROGRESS NOTES
Patient is in the office today for a follow up. Patient states he needs a new prescription for testosterone and states his insurance will cover [address]. 1. Have you been to the ER, urgent care clinic since your last visit? Hospitalized since your last visit? No    2. Have you seen or consulted any other health care providers outside of the [address] [place] [address] since your last visit? Include any pap smears or colon screening.  No

## 2017-06-13 NOTE — PROGRESS NOTES
Alma Delia Chavez is a 54 y.o.  male and presents with Cholesterol Problem (f/u); Hypogonadism; and GERD      SUBJECTIVE:  Pt's cholesterol is better but trigs are up due to poor diet which has a lot sugars and ETOH. His testosterone level is still on the low side due to poor compliance with testosterone. His insurance does not cover Androgel. He still has not been able to exercise on a regular basis. His GERD is controlled when he takes Prilosec on a regular basis. He cannot be off of it for more than a few days at a time. He continues to work on trying to stop tobacco use. Neck pain is better with PT and continued home exercises. Respiratory ROS: negative for - shortness of breath  Cardiovascular ROS: negative for - chest pain    Current Outpatient Prescriptions   Medication Sig    omeprazole (PRILOSEC) 40 mg capsule take 1 capsule by mouth once daily    testosterone (FORTESTA) 10 mg/0.5 gram /actuation glpm 2 Pump(s) by TransDERmal route daily. Max Daily Amount: 2 Pump(s).  varenicline (CHANTIX STARTER MARGA) 0.5 mg (11)- 1 mg (42) DsPk Per Dose pack instructions    triamcinolone (NASACORT AQ) 55 mcg nasal inhaler 2 Sprays daily.  tiZANidine (ZANAFLEX) 4 mg tablet Take 1 Tab by mouth every six (6) hours as needed. No current facility-administered medications for this visit.           OBJECTIVE:  alert, well appearing, and in no distress  Visit Vitals    /75 (BP 1 Location: Left arm, BP Patient Position: Sitting)    Pulse (!) 49    Temp 97.9 °F (36.6 °C) (Oral)    Resp 20    Ht 6' 5\" (1.956 m)    Wt 259 lb (117.5 kg)    SpO2 97%    BMI 30.71 kg/m2      well developed and well nourished    Labs:   Lab Results   Component Value Date/Time    Cholesterol, total 189 06/06/2017 08:37 AM    HDL Cholesterol 55 06/06/2017 08:37 AM    LDL, calculated 61.4 06/06/2017 08:37 AM    Triglyceride 363 06/06/2017 08:37 AM    CHOL/HDL Ratio 3.4 06/06/2017 08:37 AM     Lab Results   Component Value Date/Time    ALT (SGPT) 50 06/06/2017 08:37 AM    AST (SGOT) 23 06/06/2017 08:37 AM    Alk. phosphatase 47 06/06/2017 08:37 AM    Bilirubin, total 0.4 06/06/2017 08:37 AM     Lab Results   Component Value Date/Time    GFR est AA >60 06/06/2017 08:37 AM    GFR est non-AA >60 06/06/2017 08:37 AM    Creatinine 0.87 06/06/2017 08:37 AM    BUN 16 06/06/2017 08:37 AM    Sodium 137 06/06/2017 08:37 AM    Potassium 4.2 06/06/2017 08:37 AM    Chloride 102 06/06/2017 08:37 AM    CO2 25 06/06/2017 08:37 AM      Lab Results   Component Value Date/Time    Glucose 85 06/06/2017 08:37 AM      Labs:   Lab Results   Component Value Date/Time    Prostate Specific Ag 0.4 01/16/2017 07:57 AM    Prostate Specific Ag 0.4 11/03/2015 08:43 AM    Prostate Specific Ag 0.3 10/28/2014 08:09 AM    Prostate Specific Ag 0.4 10/22/2013 08:35 AM       Discussed the patient's BMI with him. The BMI follow up plan is as follows: BMI is out of normal parameters and plan is as follows: I have counseled this patient on diet and exercise regimens. Assessment/Plan      ICD-10-CM ICD-9-CM    1. Hypogonadism male E29.1 257.2 Will treat with testosterone (FORTESTA) 10 mg/0.5 gram /actuation glpm      Check PROSTATE SPECIFIC AG (PSA)      TESTOSTERONE, TOTAL, ADULT MALE      CBC WITH AUTOMATED DIFF at f/u OV   2. Mixed hyperlipidemia E78.2 272.2 Will try to control with diet and exercise LIPID PANEL   3. Gastroesophageal reflux disease without esophagitis K21.9 530.81 Well controlled on omeprazole (PRILOSEC) 40 mg capsule       Follow-up Disposition:  Return in about 4 months (around 10/13/2017) for labs 1 week before. Reviewed plan of care. Patient has provided input and agrees with goals.

## 2017-06-13 NOTE — PATIENT INSTRUCTIONS

## 2017-06-14 NOTE — TELEPHONE ENCOUNTER
Insurance denied Kassandra Caal, stating: The indicated use for this medication is not meet [employer] Service Benefit Plan Criteria due to the following reason: the ise of this medication without an evaluation of the patient's cardiovascular risk fr MI, angina, and stroke does not establish medical necessity for this drug.

## 2017-06-15 NOTE — TELEPHONE ENCOUNTER
Pt called, then [address] call re: pt advised he was told by someone at our office that medication prior Keren Whipple was approved

## 2017-06-16 ENCOUNTER — TELEPHONE (OUTPATIENT)
Dept: INTERNAL MEDICINE CLINIC | Age: 52
End: 2017-06-16

## 2017-06-16 NOTE — TELEPHONE ENCOUNTER
When patient was called by this nurse yesterday he was told the insurance did not cover the testosterone.

## 2017-06-19 NOTE — TELEPHONE ENCOUNTER
Manny Argue denied again. Will call insurance company in the morning to find out if there is anything they will cover in its place.

## 2017-06-20 NOTE — TELEPHONE ENCOUNTER
PA done again and approved. Faxed approval to pharmacy. Patient is aware Brunilda Pathak approved. Patient verbalizes understanding.

## 2017-10-18 ENCOUNTER — TELEPHONE (OUTPATIENT)
Dept: FAMILY MEDICINE CLINIC | Age: 52
End: 2017-10-18

## 2017-10-18 NOTE — TELEPHONE ENCOUNTER
Pt called stating he never went to physical therapy and now he wants to go, his ordered  need another one sent over to them he wants to go the one on [facility] please advise [phone]

## 2017-10-23 ENCOUNTER — HOSPITAL ENCOUNTER (OUTPATIENT)
Dept: LAB | Age: 52
Discharge: HOME | End: 2017-10-23
Payer: COMMERCIAL

## 2017-10-23 DIAGNOSIS — E78.2 MIXED HYPERLIPIDEMIA: ICD-10-CM

## 2017-10-23 DIAGNOSIS — E29.1 HYPOGONADISM MALE: ICD-10-CM

## 2017-10-23 LAB
BASOPHILS # BLD: 0.1 K/UL (ref 0–0.06)
BASOPHILS NFR BLD: 2 % (ref 0–2)
CHOLEST SERPL-MCNC: 193 MG/DL
DIFFERENTIAL METHOD BLD: ABNORMAL
EOSINOPHIL # BLD: 0.2 K/UL (ref 0–0.4)
EOSINOPHIL NFR BLD: 3 % (ref 0–5)
ERYTHROCYTE [DISTWIDTH] IN BLOOD BY AUTOMATED COUNT: 12.9 % (ref 11.6–14.5)
HCT VFR BLD AUTO: 42.7 % (ref 36–48)
HDLC SERPL-MCNC: 53 MG/DL (ref 40–60)
HDLC SERPL: 3.6 {RATIO} (ref 0–5)
HGB BLD-MCNC: 13.9 G/DL (ref 13–16)
LDLC SERPL CALC-MCNC: 85.4 MG/DL (ref 0–100)
LIPID PROFILE,FLP: ABNORMAL
LYMPHOCYTES # BLD: 1.6 K/UL (ref 0.9–3.6)
LYMPHOCYTES NFR BLD: 25 % (ref 21–52)
MCH RBC QN AUTO: 29.8 PG (ref 24–34)
MCHC RBC AUTO-ENTMCNC: 32.6 G/DL (ref 31–37)
MCV RBC AUTO: 91.6 FL (ref 74–97)
MONOCYTES # BLD: 0.5 K/UL (ref 0.05–1.2)
MONOCYTES NFR BLD: 7 % (ref 3–10)
NEUTS SEG # BLD: 4.1 K/UL (ref 1.8–8)
NEUTS SEG NFR BLD: 63 % (ref 40–73)
PLATELET # BLD AUTO: 354 K/UL (ref 135–420)
PMV BLD AUTO: 10.7 FL (ref 9.2–11.8)
PSA SERPL-MCNC: 0.4 NG/ML (ref 0–4)
RBC # BLD AUTO: 4.66 M/UL (ref 4.7–5.5)
TRIGL SERPL-MCNC: 273 MG/DL (ref ?–150)
VLDLC SERPL CALC-MCNC: 54.6 MG/DL
WBC # BLD AUTO: 6.5 K/UL (ref 4.6–13.2)

## 2017-10-23 PROCEDURE — 85025 COMPLETE CBC W/AUTO DIFF WBC: CPT | Performed by: INTERNAL MEDICINE

## 2017-10-23 PROCEDURE — 80061 LIPID PANEL: CPT | Performed by: INTERNAL MEDICINE

## 2017-10-23 PROCEDURE — 36415 COLL VENOUS BLD VENIPUNCTURE: CPT | Performed by: INTERNAL MEDICINE

## 2017-10-23 PROCEDURE — 84153 ASSAY OF PSA TOTAL: CPT | Performed by: INTERNAL MEDICINE

## 2017-10-23 PROCEDURE — 84403 ASSAY OF TOTAL TESTOSTERONE: CPT | Performed by: INTERNAL MEDICINE

## 2017-10-24 LAB — TESTOST SERPL-MCNC: 144 NG/DL (ref 264–916)

## 2017-10-26 ENCOUNTER — TELEPHONE (OUTPATIENT)
Dept: FAMILY MEDICINE CLINIC | Age: 52
End: 2017-10-26

## 2017-10-26 ENCOUNTER — HOSPITAL ENCOUNTER (OUTPATIENT)
Dept: PHYSICAL THERAPY | Age: 52
Discharge: HOME | End: 2017-10-26
Payer: COMMERCIAL

## 2017-10-26 PROCEDURE — 97110 THERAPEUTIC EXERCISES: CPT

## 2017-10-26 PROCEDURE — 97161 PT EVAL LOW COMPLEX 20 MIN: CPT

## 2017-10-26 NOTE — PROGRESS NOTES
[facility]  [address] [facility], [address],[place] [address]  [phone] fax    Plan of Care/ Statement of Necessity for Physical Therapy Services    Patient name: Roberto Carlos Jones Start of Care: 10/26/2017   Referral source: Sydney Pardo MD : [id number]    Medical Diagnosis: Cervicalgia [M54.2]   Onset Date:longstanding,  5 years   Treatment Diagnosis: decrease tolerance to ADLS and activities due to neck pain, decreased AROM Csp , STC B UT and Csp paraspinals with trigger points   Prior Hospitalization: see medical history Provider#: [id number]   Medications: Verified on Patient summary List    Comorbidities: none   Prior Level of Function: I all areas of ADLS and activities, no AD use, full time  , needs to tolerate household chores and community activities     The Plan of Care and following information is based on the information from the initial evaluation. Assessment/ key information: 53 YO male diagnosed as above and with S/S consistent with above diagnosis presents to skilled outpatient PT. CCO B neck pain B Csp paraspinals and to the CTX junction. ,Onset at least 5 years ago and worsening. Pain today is 5/10 better with stretching it out some times and worse with driving demands. Also reports recent R shoulder injury that has limited his AROM and pain. Pending an appointment with ortho. Previous Treatment/Compliance: prior PT trial earlier this year. Pain 5, FOTO 53, FH and RS, denies headaches, Abnormal B shoulder AROM F and abd to 90 each with reports of R shoulder pain and pending ortho consult. AROM Csp FF 28, BB 17, SB R/L 35/30, ROT  R/L 20/25. Tsp and TMJ NA, moderate STC TTP B UT/LI/Csp paraspinals  .   Patient demonstrates the potential to make gains with improved ROM, strength, endurance/activity tolerance, functional FOTO survey score  and all within a reasonable time frame so as to increase their functional independence with ADLs and activities for carryover to  Improved quality of life, tolerance to work demands and household chores/community activities. Patient requires skilled Physical Therapy so as to monitor their response to and modify their treatment plan accordingly. Patient appears to be an appropriate candidate for skilled outpatient Physical Therapy.     Evaluation Complexity History MEDIUM  Complexity : 1-2 comorbidities / personal factors will impact the outcome/ POC ; Examination HIGH Complexity : 4+ Standardized tests and measures addressing body structure, function, activity limitation and / or participation in recreation  ;Presentation LOW Complexity : Stable, uncomplicated  ;Clinical Decision Making MEDIUM Complexity : FOTO score of 26-74  Overall Complexity Rating: LOW   Problem List: pain affecting function, decrease ROM, decrease ADL/ functional abilitiies, decrease activity tolerance, decrease flexibility/ joint mobility and other FOTO 53   Treatment Plan may include any combination of the following: Therapeutic exercise, Therapeutic activities, Neuromuscular re-education, Physical agent/modality, Manual therapy, Patient education and Self Care training  Patient / Family readiness to learn indicated by: asking questions, trying to perform skills and interest  Persons(s) to be included in education: patient (P)  Barriers to Learning/Limitations: None  Patient Goal (s): Eliminate the pain   Patient Self Reported Health Status: good  Rehabilitation Potential: good    Short Term Goals: To be accomplished in 5 treatments:   1 patient will have established and be I with HEP to aid with progression of skilled PT program   EVAL issued   CURRENT   2 patient will have pain 3/10 to aid with increase tolerance to ADLS and activities   EVAL 5   CURRENT   3 patient will have FOTO improved to  59 to show increase tolerance to ADLS and activities including his work demands   EVAL 53   CURRENT  Long Term Goals:  To be accomplished in 10 treatments:   1 patient will have pain 1/10 to aid with increase tolerance to ADLS and activities   EVAL 5   CURRENT   2 patient will have FOTO improved to  65 to show increase tolerance to ADLS and activities including his work demands   EVAL 53   CURRENT   3 patient will have AROM Csp ROT B 40 and FF 35 and BB 30 to aid with increase tolerance to activities and ADLS   EVAL AROM Csp ROT R/L 20/25 and FF 28  BB 17   CURRENT   4 patient will report overall 50% improvement to aid with increase tolerance to his work demands   EVAL     CURRENT  Frequency / Duration: Patient to be seen 2-3 times per week for 10 treatments. Patient/ Caregiver education and instruction: Diagnosis, prognosis, self care, activity modification and exercises   [x]  Plan of care has been reviewed with YUDY Shoemaker, PT 10/26/2017 1:57 PM  ________________________________________________________________________    I certify that the above Therapy Services are being furnished while the patient is under my care. I agree with the treatment plan and certify that this therapy is necessary.     [de-identified] Signature:____________________  Date:____________Time: _________    Please sign and return to [facility] [place] [address], [address] [place] [address],[facility] [address]  [phone] fax

## 2017-10-26 NOTE — PROGRESS NOTES
PT DAILY TREATMENT NOTE/CERVICAL EVAL3-16    Patient Name: Arnoldo Donahue  Date:10/26/2017  : 1965  [x]  Patient  Verified  Payor: BLUE CROSS / Plan: [employer] [facility] / Product Type: PPO /    In time:140  Out time:206  Total Treatment Time (min): 26  Total Timed Codes (min): 8  1:1 Treatment Time ( only): 8   Visit #: 1 of 10    Treatment Area: Cervicalgia [M54.2]    SUBJECTIVE  Pain Level (0-10 scale): 5  [x]constant []intermittent [x]improving [x]worsening []no change since onset  Worse with driving demands, turning neck side to side, Better stretch it out sometimes  Any medication changes, allergies to medications, adverse drug reactions, diagnosis change, or new procedure performed?: [x] No    [] Yes (see summary sheet for update)  Subjective functional status/changes:     PLOF: I   Limitations to PLOF: pain  Mechanism of Injury: ongoing, longstanding last 5 years, progressively worsening,  Current symptoms/Complaints: 55 YO male diagnosed as above and with S/S consistent with above diagnosis presents to skilled outpatient PT. CCO B neck pain B Csp paraspinals and to the CTX junction. , onset at least 5 years ago and worsening. Pain today is 5/10 better with stretching it out some times and worse with driving demands. Also reports recent R shoulder injury that has limited his AROM and pain. Pending an appointment with ortho. Previous Treatment/Compliance: prior PT trial earlier this year  PMHx/Surgical Hx: none  Work Hx: full time,  for a [employer] company  Living Situation: lives in a 1 story house with his wife  Pt Goals: Eliminate  Barriers: [x]pain []financial []time []transportation []other  Motivation: good   [x]Alcohol [x]Tobacco []other:   FABQ Score: []low []elevate  Cognition: A & O x 4    Other:    OBJECTIVE/EXAMINATION  Domestic Life: work demands, walks with his wife,   Activity/Recreational Limitations: pain  Mobility: I  Self Care:  I        Modality rationale:     Min Type Additional Details    [] Estim:  []Unatt       []IFC  []Premod                        []Other:  []w/ice   []w/heat  Position:  Location:    [] Estim: []Att    []TENS instruct  []NMES                    []Other:  []w/US   []w/ice   []w/heat  Position:  Location:    []  Traction: [] Cervical       []Lumbar                       [] Prone          []Supine                       []Intermittent   []Continuous Lbs:  [] before manual  [] after manual    []  Ultrasound: []Continuous   [] Pulsed                           []1MHz   []3MHz Location:  W/cm2:    []  Iontophoresis with dexamethasone         Location: [] Take home patch   [] In clinic    []  Ice     []  heat  []  Ice massage  []  Laser   []  Anodyne Position:  Location:    []  Laser with stim  []  Other: Position:  Location:    []  Vasopneumatic Device Pressure:       [] lo [] med [] hi   Temperature: [] lo [] med [] hi   [] Skin assessment post-treatment:  []intact []redness- no adverse reaction    []redness - adverse reaction:     18 min [x]Eval                  []Re-Eval       8 min Therapeutic Exercise:  [x] See flow sheet :   Rationale: increase ROM, increase strength and improve coordination to improve the patients ability to aid with increase tolerance to ADLS and activities     min Therapeutic Activity:  []  See flow sheet :   Rationale:   to improve the patients ability to       min Neuromuscular Re-education:  []  See flow sheet :   Rationale:   to improve the patients ability to      min Manual Therapy:     Rationale:  to      min Gait Training:  ___ feet with ___ device on level surfaces with ___ level of assist   Rationale:           With   [] TE   [] TA   [] neuro   [] other: Patient Education: [x] Review HEP    [] Progressed/Changed HEP based on:   [] positioning   [] body mechanics   [] transfers   [] heat/ice application    [] other:      Other Objective/Functional Measures:     Physical Therapy Evaluation Cervical Spine     SUBJECTIVE  Chief Complaint:    Mechanism of injury:    Symptoms  Aggravated by:   [] Bending [] Sitting [] Standing [] Reaching Overhead   [] Moving [] Cough [] Sneeze [] Eating   [] AM  [] PM  Lying:  [] sup   [] pro   [] sidelying   [] Other:     Eased by:    [] Bending [] Sitting [] Standing Lying: [] sup  [] pro  [] sidelying   [] Moving [] AM  [] PM  [] Other:     General Health:  Red Flags Indicated? [] Yes    [] No  [] Yes [] No Recent weight change (If yes, due to dieting? [] Yes  [] No)   [] Yes [] No Persistent cough  [] Yes [] No Unremitting pain at night  [] Yes [] No Dizziness  [] Yes [] No Blurred vision  [] Yes [] No Hands more cold or painful in cold weather  [] Yes [] No Ringing in ears  [] Yes [] No Difficulty swallowing  [] Yes [] No Dysfunction of bowel or bladder  [] Yes [] No Recent illness within past 3 weeks (i.e, cold, flu)  [] Yes [] No Jaw pain    Past History/Treatments:    Diagnostic Tests: [] Lab work [] X-rays    [] CT [] MRI     [] Other:  Results:    Functional Status  Prior level of function:  Present functional limitations: FOTO  What position do you sleep in?: SL    Headaches: Do you have headaches? [] Yes   [x] No  How often do you get headaches? How long does the headache last?  What aggravates it? What relieves it? Does the headache coincide with any other symptoms (visual disturbances, light sensitivity)? Where is the headache? Does it change locations? Other:    OBJECTIVE  Posture: [] WNL  Head Position: FH ,RS  Shoulder/Scapular Position:  C-Kyphosis:  [] increased   [] decreased   C-Lordosis:   [] increased   [] decreased  T-Kyphosis:  [] increased   [] decreased  T-Lordosis:   [] increased   [] decreased     TMJ: [x] N/A [] Abnormal - ROM:   Palpation:    Cervical Retraction: [x] WNL    [] Abnormal:    Shoulder/Scapular Screen: [] WNL    [x] Abnormal:B shoulder AROM F and Abd to 90.     Active Movements: [] N/A   [] Too acute   [] Other:  ROM  AROM  degrees % PROM Comments:pain, area   Forward flexion 28     Extension 17     SB right 35     SB left  30     Rotation right 20     Rotation left 25       Thoracic Spine: [x] N/A    [] WNL   [] Other:    PROM:    Palpation:  [] Min  [x] Mod  [] Severe    Location: STC TTP B UT/LI/Csp paraspinals    [] Min  [] Mod  [] Severe    Location:  [] Min  [] Mod  [] Severe    Location:    Neuro Screen (myotome/dematome/felexes): [] WNL  Myotome Level Muscle Test Myotome Level Muscle Test   C5 Shoulder Adduction - Deltoid C8 Finger Flexors   C6 Wrist Extension T1 Finger Abduction - Interossei   C7 Elbow Extension     Comments:  Upper Limb Tension Tests: [] N/A       Ulnar: [] R    [] L    [] +    [] -       Median: [] R    [] L    [] +    [] -       Radial: [] R    [] L    [] +    [] -    Special Tests:  Cervical:        Vertebral Artery:  [] R    [] L    [] +    [] -       Alar Ligament: [] R    [] L    [] +    [] -       Transverse Lig: [] R    [] L    [] +    [] -       Spurling's:  [] R    [] L    [] +    [] -       Distraction:  [] R    [] L    [] +    [] -       Compression: [] R    [] L    [] +    [] -    Thoracic Outlet Tests: [] N/A       Adson's:  [] R    [] L    [] +    [] -       Hyperabduction: [] R    [] L    [] +    [] -       Vincent's:  [] R    [] L    [] +    [] -       Carmen:  [] R    [] L    [] +    [] -    Diaphragmatic Breathing: [] Normal    [] Abnormal    Muscle Flexibility: [] N/A   Scalenes: [] WNL    [] Tight    [] R    [] L   Upper Trap: [] WNL    [] Tight    [] R    [] L   Levator: [] WNL    [] Tight    [] R    [] L   Pect. Minor: [] WNL    [] Tight    [] R    [] L    Global Muscular Weakness: [] N/A   Lower Trap:   Rhomboids:   Middle Trap:   Serratus Ant:   Ext Rotators:    Other:    Other tests/comments:       Pain Level (0-10 scale) post treatment: 5    ASSESSMENT/Changes in Function: Patient demonstrates the potential to make gains with improved ROM, strength, endurance/activity tolerance, functional FOTO survey score  and all within a reasonable time frame so as to increase their functional independence with ADLs and activities for carryover to  Improved quality of life, tolerance to work demands and household chores/community activities. Patient requires skilled Physical Therapy so as to monitor their response to and modify their treatment plan accordingly. Patient appears to be an appropriate candidate for skilled outpatient Physical Therapy. Patient will continue to benefit from skilled PT services to modify and progress therapeutic interventions, address functional mobility deficits, address ROM deficits, address strength deficits, analyze and address soft tissue restrictions, analyze and cue movement patterns, analyze and modify body mechanics/ergonomics, assess and modify postural abnormalities and instruct in home and community integration to attain remaining goals.      [x]  See Plan of Care  []  See progress note/recertification  []  See Discharge Summary         Progress towards goals / Updated goals:       PLAN  [x]  Upgrade activities as tolerated     [x]  Continue plan of care  []  Update interventions per flow sheet       []  Discharge due to:_  []  Other:_      Mari Liu, PT 10/26/2017  1:40 PM

## 2017-10-26 NOTE — TELEPHONE ENCOUNTER
Patient arrived at Physical Therapy 10/26/17. The order for neck pain was not in system. The did see him for it but are requesting the order be placed in cc.

## 2017-10-30 ENCOUNTER — OFFICE VISIT (OUTPATIENT)
Dept: FAMILY MEDICINE CLINIC | Age: 52
End: 2017-10-30

## 2017-10-30 VITALS
DIASTOLIC BLOOD PRESSURE: 74 MMHG | HEART RATE: 70 BPM | SYSTOLIC BLOOD PRESSURE: 148 MMHG | TEMPERATURE: 97.9 F | WEIGHT: 255 LBS | HEIGHT: 77 IN | OXYGEN SATURATION: 98 % | BODY MASS INDEX: 30.11 KG/M2 | RESPIRATION RATE: 20 BRPM

## 2017-10-30 DIAGNOSIS — F51.01 PRIMARY INSOMNIA: ICD-10-CM

## 2017-10-30 DIAGNOSIS — E29.1 HYPOGONADISM MALE: Primary | ICD-10-CM

## 2017-10-30 RX ORDER — TESTOSTERONE 10 MG/.5G
2 GEL, METERED TOPICAL DAILY
Qty: 60 G | Refills: 3 | Status: SHIPPED | OUTPATIENT
Start: 2017-10-30 | End: 2018-03-13 | Stop reason: ALTCHOICE

## 2017-10-30 RX ORDER — ZOLPIDEM TARTRATE 10 MG/1
10 TABLET ORAL
Qty: 30 TAB | Refills: 3 | Status: SHIPPED | OUTPATIENT
Start: 2017-10-30 | End: 2018-03-13 | Stop reason: SDUPTHER

## 2017-10-30 NOTE — PATIENT INSTRUCTIONS
Shoulder Pain: Care Instructions  Your Care Instructions    You can hurt your shoulder by using it too much during an activity, such as fishing or baseball. It can also happen as part of the everyday wear and tear of getting older. Shoulder injuries can be slow to heal, but your shoulder should get better with time. Your doctor may recommend a sling to rest your shoulder. If you have injured your shoulder, you may need testing and treatment. Follow-up care is a key part of your treatment and safety. Be sure to make and go to all appointments, and call your doctor if you are having problems. It's also a good idea to know your test results and keep a list of the medicines you take. How can you care for yourself at home? · Take pain medicines exactly as directed. ¨ If the doctor gave you a prescription medicine for pain, take it as prescribed. ¨ If you are not taking a prescription pain medicine, ask your doctor if you can take an over-the-counter medicine. ¨ Do not take two or more pain medicines at the same time unless the doctor told you to. Many pain medicines contain acetaminophen, which is Tylenol. Too much acetaminophen (Tylenol) can be harmful. · If your doctor recommends that you wear a sling, use it as directed. Do not take it off before your doctor tells you to. · Put ice or a cold pack on the sore area for 10 to 20 minutes at a time. Put a thin cloth between the ice and your skin. · If there is no swelling, you can put moist heat, a heating pad, or a warm cloth on your shoulder. Some doctors suggest alternating between hot and cold. · Rest your shoulder for a few days. If your doctor recommends it, you can then begin gentle exercise of the shoulder, but do not lift anything heavy. When should you call for help? Call 911 anytime you think you may need emergency care. For example, call if:  ? · You have chest pain or pressure. This may occur with:  ¨ Sweating.   ¨ Shortness of breath. ¨ Nausea or vomiting. ¨ Pain that spreads from the chest to the neck, jaw, or one or both shoulders or arms. ¨ Dizziness or lightheadedness. ¨ A fast or uneven pulse. After calling 911, chew 1 adult-strength aspirin. Wait for an ambulance. Do not try to drive yourself. ? · Your arm or hand is cool or pale or changes color. ?Call your doctor now or seek immediate medical care if:  ? · You have signs of infection, such as:  ¨ Increased pain, swelling, warmth, or redness in your shoulder. ¨ Red streaks leading from a place on your shoulder. ¨ Pus draining from an area of your shoulder. ¨ Swollen lymph nodes in your neck, armpits, or groin. ¨ A fever. ? Watch closely for changes in your health, and be sure to contact your doctor if:  ? · You cannot use your shoulder. ? · Your shoulder does not get better as expected. Where can you learn more? Go to http://deni-freddy.info/. Enter R577 in the search box to learn more about \"Shoulder Pain: Care Instructions. \"  Current as of: March 21, 2017  Content Version: 11.4  © 1733-6712 [employer]. Care instructions adapted under license by [employer] (which disclaims liability or warranty for this information). If you have questions about a medical condition or this instruction, always ask your healthcare professional. [place] [address] any warranty or liability for your use of this information.

## 2017-10-30 NOTE — PROGRESS NOTES
Patient is in the office today for a 4 month follow up. Patient states he is having some shoulder pain. 1. Have you been to the ER, urgent care clinic since your last visit? Hospitalized since your last visit? Yes, [facility].     2. Have you seen or consulted any other health care providers outside of the [address] [place] [address] since your last visit? Include any pap smears or colon screening.  No

## 2017-10-30 NOTE — MR AVS SNAPSHOT
Visit Information Date & Time Provider Department Dept. Phone Encounter #  
 [facility]30[facility]2017  [facility] [phone] [id number] Follow-up Instructions Return in about 4 months (around 2/28/2018) for labs 1 week before. Your Appointments 11/2/2017  9:30 AM  
New Patient with Sirisha Trent MD  
[address] [facility], [address] [facility] ([facility]) Appt Note: RT SHOULDER PAIN/NX/BCBS/SELF REF  
 [address], [address] [facility]  
[phone] [address], [address] Upcoming Health Maintenance Date Due DTaP/Tdap/Td series (2 - Td) 8/6/2015 INFLUENZA AGE 9 TO ADULT 8/1/2017 Pneumococcal 19-64 Medium Risk (1 of 1 - PPSV23) 10/31/2017* *Topic was postponed. The date shown is not the original due date. Allergies as of 10/30/2017  Review Complete On: 10/30/2017 By: Maki Noguera LPN No Known Allergies Current Immunizations  Never Reviewed Name Date Influenza Vaccine Whole 8/6/2010 TDAP Vaccine 8/6/2005 Not reviewed this visit You Were Diagnosed With   
  
 Codes Comments Primary insomnia    -  Primary ICD-10-CM: F51.01 
ICD-9-CM: 307.42 Hypogonadism male     ICD-10-CM: E29.1 ICD-9-CM: 257.2 Vitals BP Pulse Temp Resp Height(growth percentile) Weight(growth percentile) 148/74 (BP 1 Location: Left arm, BP Patient Position: Sitting) 70 97.9 °F (36.6 °C) (Oral) 20 6' 5\" (1.956 m) 255 lb (115.7 kg) SpO2 BMI Smoking Status 98% 30.24 kg/m2 Current Every Day Smoker BMI and BSA Data Body Mass Index Body Surface Area  
 30.24 kg/m 2 2.51 m 2 Preferred Pharmacy Pharmacy Name Phone [address], [address] [place] [address] [phone] Your Updated Medication List  
  
   
 This list is accurate as of: 10/30/17  3:20 PM.  Always use your most recent med list.  
  
  
  
  
 NASACORT AQ 55 mcg nasal inhaler Generic drug:  triamcinolone 2 Sprays daily. omeprazole 40 mg capsule Commonly known as:  PRILOSEC  
take 1 capsule by mouth once daily  
  
 testosterone 10 mg/0.5 gram /actuation Glpm  
Commonly known as:  FORTESTA  
2 Pump(s) by TransDERmal route daily. Max Daily Amount: 2 Pump(s). tiZANidine 4 mg tablet Commonly known as:  Stewart Sleek Take 1 Tab by mouth every six (6) hours as needed. varenicline 0.5 mg (11)- 1 mg (42) Dspk Commonly known as:  CHANTIX STARTER MARGA Per Dose pack instructions  
  
 zolpidem 10 mg tablet Commonly known as:  AMBIEN Take 1 Tab by mouth nightly as needed for Sleep. Max Daily Amount: 10 mg.  
  
  
  
  
Prescriptions Printed Refills  
 zolpidem (AMBIEN) 10 mg tablet 3 Sig: Take 1 Tab by mouth nightly as needed for Sleep. Max Daily Amount: 10 mg.  
 Class: Print Route: Oral  
 testosterone (FORTESTA) 10 mg/0.5 gram /actuation glpm 3 Si Pump(s) by TransDERmal route daily. Max Daily Amount: 2 Pump(s). Class: Print Route: TransDERmal  
  
We Performed the Following REFERRAL TO UROLOGY [YBK493 Custom] Comments:  
 Refer to Dr Teri Lott for hypogonadism Follow-up Instructions Return in about 4 months (around 2018) for labs 1 week before. Referral Information Referral ID Referred By Referred To  
  
 [id number] Vijay HORN 9, R [facility] 21   
   [address] , [address] [facility] [address] Phone: [phone] Fax: [phone] Visits Status Start Date End Date 1 New Request 10/30/17 10/30/18 If your referral has a status of pending review or denied, additional information will be sent to support the outcome of this decision. Patient Instructions Shoulder Pain: Care Instructions Your Care Instructions You can hurt your shoulder by using it too much during an activity, such as fishing or baseball. It can also happen as part of the everyday wear and tear of getting older. Shoulder injuries can be slow to heal, but your shoulder should get better with time. Your doctor may recommend a sling to rest your shoulder. If you have injured your shoulder, you may need testing and treatment. Follow-up care is a key part of your treatment and safety. Be sure to make and go to all appointments, and call your doctor if you are having problems. It's also a good idea to know your test results and keep a list of the medicines you take. How can you care for yourself at home? · Take pain medicines exactly as directed. ¨ If the doctor gave you a prescription medicine for pain, take it as prescribed. ¨ If you are not taking a prescription pain medicine, ask your doctor if you can take an over-the-counter medicine. ¨ Do not take two or more pain medicines at the same time unless the doctor told you to. Many pain medicines contain acetaminophen, which is Tylenol. Too much acetaminophen (Tylenol) can be harmful. · If your doctor recommends that you wear a sling, use it as directed. Do not take it off before your doctor tells you to. · Put ice or a cold pack on the sore area for 10 to 20 minutes at a time. Put a thin cloth between the ice and your skin. · If there is no swelling, you can put moist heat, a heating pad, or a warm cloth on your shoulder. Some doctors suggest alternating between hot and cold. · Rest your shoulder for a few days. If your doctor recommends it, you can then begin gentle exercise of the shoulder, but do not lift anything heavy. When should you call for help? Call 911 anytime you think you may need emergency care. For example, call if: 
? · You have chest pain or pressure. This may occur with: ¨ Sweating. ¨ Shortness of breath. ¨ Nausea or vomiting. ¨ Pain that spreads from the chest to the neck, jaw, or one or both shoulders or arms. ¨ Dizziness or lightheadedness. ¨ A fast or uneven pulse. After calling 911, chew 1 adult-strength aspirin. Wait for an ambulance. Do not try to drive yourself. ? · Your arm or hand is cool or pale or changes color. ?Call your doctor now or seek immediate medical care if: 
? · You have signs of infection, such as: 
¨ Increased pain, swelling, warmth, or redness in your shoulder. ¨ Red streaks leading from a place on your shoulder. ¨ Pus draining from an area of your shoulder. ¨ Swollen lymph nodes in your neck, armpits, or groin. ¨ A fever. ? Watch closely for changes in your health, and be sure to contact your doctor if: 
? · You cannot use your shoulder. ? · Your shoulder does not get better as expected. Where can you learn more? Go to http://deni-freddy.info/. Enter I931 in the search box to learn more about \"Shoulder Pain: Care Instructions. \" Current as of: March 21, 2017 Content Version: 11.4 © 3762-5805 [employer]. Care instructions adapted under license by [employer] (which disclaims liability or warranty for this information). If you have questions about a medical condition or this instruction, always ask your healthcare professional. [address] any warranty or liability for your use of this information. Introducing [facility]! Rina Lorenz introduces [employer] patient portal. Now you can access parts of your medical record, email your doctor's office, and request medication refills online. 1. In your internet browser, go to [email or web address] 2. Click on the First Time User? Click Here link in the Sign In box. You will see the New Member Sign Up page. 3. Enter your [employer] Access Code exactly as it appears below. You will not need to use this code after youve completed the sign-up process.  If you do not sign up before the expiration date, you must request a new code. · TestQuest Access Code: [email or web address] Expires: 1/16/2018 10:53 AM 
 
4. Enter the last four digits of your Social Security Number (xxxx) and Date of Birth (mm/dd/yyyy) as indicated and click Submit. You will be taken to the next sign-up page. 5. Create a TestQuest ID. This will be your TestQuest login ID and cannot be changed, so think of one that is secure and easy to remember. 6. Create a TestQuest password. You can change your password at any time. 7. Enter your Password Reset Question and Answer. This can be used at a later time if you forget your password. 8. Enter your e-mail address. You will receive e-mail notification when new information is available in [address] 9. Click Sign Up. You can now view and download portions of your medical record. 10. Click the Download Summary menu link to download a portable copy of your medical information. If you have questions, please visit the Frequently Asked Questions section of the [employer]Quest website. Remember, TestQuest is NOT to be used for urgent needs. For medical emergencies, dial 911. Now available from your iPhone and Android! Please provide this summary of care documentation to your next provider. Your primary care clinician is listed as JONATHAN HORN. If you have any questions after today's visit, please call [phone].

## 2017-11-01 ENCOUNTER — HOSPITAL ENCOUNTER (OUTPATIENT)
Dept: PHYSICAL THERAPY | Age: 52
Discharge: HOME | End: 2017-11-01
Payer: COMMERCIAL

## 2017-11-01 PROCEDURE — 97140 MANUAL THERAPY 1/> REGIONS: CPT

## 2017-11-01 PROCEDURE — 97032 APPL MODALITY 1+ESTIM EA 15: CPT

## 2017-11-01 PROCEDURE — 97110 THERAPEUTIC EXERCISES: CPT

## 2017-11-01 NOTE — PROGRESS NOTES
PT DAILY TREATMENT NOTE - [facility]     Patient Name: Dayami Felizelor  Date:2017  : 1965  [x]  Patient  Verified  Payor: BLUE CROSS / Plan: [employer] [place] [address] [place] / Product Type: PPO /    In time:2:29  Out time:3:25  Total Treatment Time (min): 38  Visit #: 2 of 10    Treatment Area: Cervicalgia [M54.2]    SUBJECTIVE  Pain Level (0-10 scale): 5  Any medication changes, allergies to medications, adverse drug reactions, diagnosis change, or new procedure performed?: [x] No    [] Yes (see summary sheet for update)  Subjective functional status/changes:   [] No changes reported  Some pain.     OBJECTIVE    Modality rationale: decrease edema, decrease inflammation, decrease pain and increase tissue extensibility to improve the patients ability to perform ADL    Min Type Additional Details    [] Estim:  []Unatt       []IFC  []Premod                        []Other:  []w/ice   []w/heat  Position:  Location:   8 [x] Estim: [x]Att    []TENS instruct  []NMES                    [x]Other: LTO []w/US   []w/ice   []w/heat  Position:seated  Location:(B) UT    []  Traction: [] Cervical       []Lumbar                       [] Prone          []Supine                       []Intermittent   []Continuous Lbs:  [] before manual  [] after manual    []  Ultrasound: []Continuous   [] Pulsed                           []1MHz   []3MHz W/cm2:  Location:    []  Iontophoresis with dexamethasone         Location: [] Take home patch   [] In clinic    []  Ice     []  heat  []  Ice massage  []  Laser   []  Anodyne Position:  Location:    []  Laser with stim  []  Other:  Position:  Location:    []  Vasopneumatic Device Pressure:       [] lo [] med [] hi   Temperature: [] lo [] med [] hi   [x] Skin assessment post-treatment:  [x]intact []redness- no adverse reaction    []redness - adverse reaction:      min []Eval                  []Re-Eval       18 min Therapeutic Exercise:  [x] See flow sheet :   Rationale: increase ROM and increase strength to improve the patients ability to perform ADL      min Therapeutic Activity:  []  See flow sheet :   Rationale:   to improve the patients ability to       min Neuromuscular Re-education:  []  See flow sheet :   Rationale:   to improve the patients ability to     12 min Manual Therapy:  CSP TX  With passive  Stretch (B) UT   Rationale: decrease pain, increase ROM, increase tissue extensibility and decrease edema  to perform ADL      min Gait Training:  ___ feet with ___ device on level surfaces with ___ level of assist   Rationale: With   [x] TE   [] TA   [] neuro   [] other: Patient Education: [x] Review HEP    [] Progressed/Changed HEP based on:   [] positioning   [] body mechanics   [] transfers   [] heat/ice application    [] other:      Other Objective/Functional Measures:  Fair  Response  To each there ex. Pain Level (0-10 scale) post treatment: 0    ASSESSMENT/Changes in Function: tightness  (B) UT. Benefited  With  Treatment  Today. Patient will continue to benefit from skilled PT services to address functional mobility deficits, address ROM deficits, address strength deficits, analyze and address soft tissue restrictions, analyze and cue movement patterns and instruct in home and community integration to attain remaining goals.      [x]  See Plan of Care  []  See progress note/recertification  []  See Discharge Summary         Progress towards goals / Updated goals:              1 patient will have established and be I with HEP to aid with progression of skilled PT program                         EVAL issued                         CURRENT                         2 patient will have pain 3/10 to aid with increase tolerance to ADLS and activities                         EVAL 5                         CURRENT                         3 patient will have FOTO improved to  59 to show increase tolerance to ADLS and activities including his work demands                         EVAL 53 CURRENT  Long Term Goals:  To be accomplished in 10 treatments:                         1 patient will have pain 1/10 to aid with increase tolerance to ADLS and activities                         EVAL 5                         CURRENT                         2 patient will have FOTO improved to  65 to show increase tolerance to ADLS and activities including his work demands                         EVAL 53                         CURRENT                         3 patient will have AROM Csp ROT B 40 and FF 35 and BB 30 to aid with increase tolerance to activities and ADLS                         EVAL AROM Csp ROT R/L 20/25 and FF 28  BB 17                         CURRENT                         4 patient will report overall 50% improvement to aid with increase tolerance to his work demands                         EVAL                                       CURRENT    PLAN  []  Upgrade activities as tolerated     [x]  Continue plan of care  []  Update interventions per flow sheet       []  Discharge due to:_  []  Other:_      Keli Guzman PTA 11/1/2017  2:25 PM    Future Appointments  Date Time Provider Sandra Garcia   11/1/2017 2:30 PM Sayra Perdomo PTA [id number]   11/2/2017 9:30 AM Tan Livingston MD [place] [id number]   11/8/2017 2:00 PM YUDY OlivoPTGIACOMO [facility]   3/6/2018 7:45 AM [facility] NURSE [facility] SCHED   3/13/2018 8:15 AM Jayden Rojas MD [address] [place] [address]

## 2017-11-02 ENCOUNTER — OFFICE VISIT (OUTPATIENT)
Dept: ORTHOPEDIC SURGERY | Age: 52
End: 2017-11-02

## 2017-11-02 VITALS
HEIGHT: 76 IN | BODY MASS INDEX: 30.44 KG/M2 | HEART RATE: 71 BPM | WEIGHT: 250 LBS | TEMPERATURE: 97.2 F | OXYGEN SATURATION: 96 % | SYSTOLIC BLOOD PRESSURE: 138 MMHG | DIASTOLIC BLOOD PRESSURE: 86 MMHG

## 2017-11-02 DIAGNOSIS — M75.101 TEAR OF RIGHT ROTATOR CUFF, UNSPECIFIED TEAR EXTENT: Primary | ICD-10-CM

## 2017-11-02 DIAGNOSIS — S46.811A PARTIAL TEAR OF RIGHT SUBSCAPULARIS TENDON, INITIAL ENCOUNTER: ICD-10-CM

## 2017-11-02 DIAGNOSIS — M25.511 ACUTE PAIN OF RIGHT SHOULDER: ICD-10-CM

## 2017-11-02 NOTE — PROGRESS NOTES
Arnoldo Donahue  1965   Chief Complaint   Patient presents with    Shoulder Pain     RIGHT        HISTORY OF PRESENT ILLNESS  Arnoldo Donahue is a 50 y.o. male who presents today for evaluation of right shoulder pain. he rates his pain 5/10 today. Pain has been present for about 1.5 months subsequent to picking up some heavy weights. Patient describes the pain as aching and throbbing that is Constant in nature. Symptoms are worse with overhead motions and reaching behind the back and is better with  Rest. Associated symptoms include weakness. Since problem started, it: is unchanged. Pain does wake patient up at night. Notes the most pain first thing in the morning. Has taken no medication for the problem. Has tried following treatments: Injections:NO; Brace:NO; Therapy:NO; Cane/Crutch:NO       No Known Allergies     Past Medical History:   Diagnosis Date    High cholesterol 9/13/2013    Sleep apnea       Social History     Social History    Marital status:      Spouse name: N/A    Number of children: N/A    Years of education: N/A     Occupational History    Not on file. Social History Main Topics    Smoking status: Current Every Day Smoker     Packs/day: 0.50     Years: 8.00    Smokeless tobacco: Never Used    Alcohol use 0.0 oz/week     0 Standard drinks or equivalent per week      Comment: Drinks heavily 2 times a week.  Drug use: Yes      Comment: cocaine powder    Sexual activity: Not on file     Other Topics Concern    Not on file     Social History Narrative      Past Surgical History:   Procedure Laterality Date    HX ORTHOPAEDIC      Broken Sturnum       Family History   Problem Relation Age of Onset    Hypertension Mother     Stroke Mother     Diabetes Mother     Heart Disease Mother     Heart Disease Father       Current Outpatient Prescriptions   Medication Sig    zolpidem (AMBIEN) 10 mg tablet Take 1 Tab by mouth nightly as needed for Sleep.  Max Daily Amount: 10 mg.    testosterone (FORTESTA) 10 mg/0.5 gram /actuation glpm 2 Pump(s) by TransDERmal route daily. Max Daily Amount: 2 Pump(s).  omeprazole (PRILOSEC) 40 mg capsule take 1 capsule by mouth once daily    varenicline (CHANTIX STARTER MARGA) 0.5 mg (11)- 1 mg (42) DsPk Per Dose pack instructions    triamcinolone (NASACORT AQ) 55 mcg nasal inhaler 2 Sprays daily.  tiZANidine (ZANAFLEX) 4 mg tablet Take 1 Tab by mouth every six (6) hours as needed. No current facility-administered medications for this visit. REVIEW OF SYSTEM   Patient denies: Weight loss, Fever/Chills, HA, Visual changes, Fatigue, Chest pain, SOB, Abdominal pain, N/V/D/C, Blood in stool or urine, Edema. Pertinent positive as above in HPI. All others were negative    PHYSICAL EXAM:   Visit Vitals    /86    Pulse 71    Temp 97.2 °F (36.2 °C) (Oral)    Ht 6' 4\" (1.93 m)    Wt 250 lb (113.4 kg)    SpO2 96%    BMI 30.43 kg/m2     The patient is a well-developed, well-nourished male   in no acute distress. The patient is alert and oriented times three. The patient is alert and oriented times three. Mood and affect are normal.  LYMPHATIC: lymph nodes are not enlarged and are within normal limits  SKIN: normal in color and non tender to palpation. There are no bruises or abrasions noted. NEUROLOGICAL: Motor sensory exam is within normal limits. Reflexes are equal bilaterally.  There is normal sensation to pinprick and light touch  MUSCULOSKELETAL:  Examination Right shoulder   Skin Intact   AC joint tenderness -   Biceps tenderness -   Forward flexion/Elevation    Active abduction    Glenohumeral abduction 90   External rotation ROM 90   Internal rotation ROM 70   Apprehension -   Alberts Relocation -   Jerk -   Load and Shift -   Obriens -   Speeds -   Impingement sign +   Supraspinatus/Empty Can +   External Rotation Strength -, 5/5   Lift Off/Belly Press ++   Neurovascular Intact       IMAGING: XR of the right shoulder dated 11/2/17 was reviewed and read:   sclerotic changes of the greater tuberosity with a type 3 acromion     IMPRESSION:      ICD-10-CM ICD-9-CM    1. Tear of right rotator cuff, unspecified tear extent M75.101 840.4 MRI SHOULDER RT WO CONT   2. Acute pain of right shoulder M25.511 719.41 AMB POC XRAY, SHOULDER; COMPLETE, 2+   3. Partial tear of right subscapularis tendon, initial encounter S43.81XA 840.5         PLAN:  1. I am concerned about a possible rotator cuff tear of the right shoulder. Risk factors include: n/a  2. No cortisone injection indicated today   3. No Physical/Occupational Therapy indicated today  4. Yes diagnostic test indicated today: MRI RIGHT SHOULDER  5. No durable medical equipment indicated today  6. No referral indicated today   7. No medications indicated today  8. No Narcotic indicated today    RTC following completion of the MRI  Follow-up Disposition: Not on File    Scribed by Ra Olivares [facility]) as dictated by Jean Lezama MD    I, Dr. Jean Lezama, confirm that all documentation is accurate.     Jean Lezama M.D.   [facility]

## 2017-11-02 NOTE — PATIENT INSTRUCTIONS
Joint Pain: Care Instructions  Your Care Instructions    Many people have small aches and pains from overuse or injury to muscles and joints. Joint injuries often happen during sports or recreation, work tasks, or projects around the home. An overuse injury can happen when you put too much stress on a joint or when you do an activity that stresses the joint over and over, such as using the computer or rowing a boat. You can take action at home to help your muscles and joints get better. You should feel better in 1 to 2 weeks, but it can take 3 months or more to heal completely. Follow-up care is a key part of your treatment and safety. Be sure to make and go to all appointments, and call your doctor if you are having problems. It's also a good idea to know your test results and keep a list of the medicines you take. How can you care for yourself at home? · Do not put weight on the injured joint for at least a day or two. · For the first day or two after an injury, do not take hot showers or baths, and do not use hot packs. The heat could make swelling worse. · Put ice or a cold pack on the sore joint for 10 to 20 minutes at a time. Try to do this every 1 to 2 hours for the next 3 days (when you are awake) or until the swelling goes down. Put a thin cloth between the ice and your skin. · Wrap the injury in an elastic bandage. Do not wrap it too tightly because this can cause more swelling. · Prop up the sore joint on a pillow when you ice it or anytime you sit or lie down during the next 3 days. Try to keep it above the level of your heart. This will help reduce swelling. · Take an over-the-counter pain medicine, such as acetaminophen (Tylenol), ibuprofen (Advil, Motrin), or naproxen (Aleve). Read and follow all instructions on the label. · After 1 or 2 days of rest, begin moving the joint gently.  While the joint is still healing, you can begin to exercise using activities that do not strain or hurt the painful joint. When should you call for help? Call your doctor now or seek immediate medical care if:  ? · You have signs of infection, such as:  ¨ Increased pain, swelling, warmth, and redness. ¨ Red streaks leading from the joint. ¨ A fever. ? Watch closely for changes in your health, and be sure to contact your doctor if:  ? · Your movement or symptoms are not getting better after 1 to 2 weeks of home treatment. Where can you learn more? Go to http://deni-freddy.info/. Enter P205 in the search box to learn more about \"Joint Pain: Care Instructions. \"  Current as of: March 21, 2017  Content Version: 11.4  © 9533-2597 [employer]. Care instructions adapted under license by [employer] (which disclaims liability or warranty for this information). If you have questions about a medical condition or this instruction, always ask your healthcare professional. [address] any warranty or liability for your use of this information.

## 2017-11-03 ENCOUNTER — APPOINTMENT (OUTPATIENT)
Dept: PHYSICAL THERAPY | Age: 52
End: 2017-11-03
Payer: COMMERCIAL

## 2017-11-07 ENCOUNTER — HOSPITAL ENCOUNTER (OUTPATIENT)
Age: 52
Discharge: HOME | End: 2017-11-07
Attending: ORTHOPAEDIC SURGERY
Payer: COMMERCIAL

## 2017-11-07 DIAGNOSIS — M75.101 TEAR OF RIGHT ROTATOR CUFF, UNSPECIFIED TEAR EXTENT: ICD-10-CM

## 2017-11-07 PROCEDURE — 73221 MRI JOINT UPR EXTREM W/O DYE: CPT

## 2017-11-08 ENCOUNTER — TELEPHONE (OUTPATIENT)
Dept: FAMILY MEDICINE CLINIC | Age: 52
End: 2017-11-08

## 2017-11-08 ENCOUNTER — HOSPITAL ENCOUNTER (OUTPATIENT)
Dept: PHYSICAL THERAPY | Age: 52
Discharge: HOME | End: 2017-11-08
Payer: COMMERCIAL

## 2017-11-08 PROCEDURE — 97110 THERAPEUTIC EXERCISES: CPT

## 2017-11-08 PROCEDURE — 97140 MANUAL THERAPY 1/> REGIONS: CPT

## 2017-11-08 PROCEDURE — 97032 APPL MODALITY 1+ESTIM EA 15: CPT

## 2017-11-08 RX ORDER — CYCLOBENZAPRINE HCL 10 MG
10 TABLET ORAL
Qty: 30 TAB | Refills: 1 | Status: SHIPPED | OUTPATIENT
Start: 2017-11-08 | End: 2020-11-13

## 2017-11-08 NOTE — TELEPHONE ENCOUNTER
Pt walked in and is requesting a rx for flexeril due to PT and having a hard time with his neck. Stated that he was prescribed that a long time but needs it now since PT is harsh on him.

## 2017-11-08 NOTE — PROGRESS NOTES
PT DAILY TREATMENT NOTE - [facility]     Patient Name: Sandor Phoenix  Date:2017  : 1965  [x]  Patient  Verified  Payor: BLUE CROSS / Plan:  [facility] / Product Type: PPO /    In time:2:02  Out time:2:58  Total Treatment Time (min): 45  Visit #: 3 of 10    Treatment Area: Cervicalgia [M54.2]    SUBJECTIVE  Pain Level (0-10 scale): 6-7  Any medication changes, allergies to medications, adverse drug reactions, diagnosis change, or new procedure performed?: [x] No    [] Yes (see summary sheet for update)  Subjective functional status/changes:   [] No changes reported  Its  Tight  And  Hurting  Today.     OBJECTIVE    Modality rationale: decrease edema, decrease inflammation, decrease pain and increase tissue extensibility to improve the patients ability to perform ADL    Min Type Additional Details    [] Estim:  []Unatt       []IFC  []Premod                        []Other:  []w/ice   []w/heat  Position:  Location:   10 [x] Estim: [x]Att    []TENS instruct  []NMES                    [x]Other: LTO []w/US   []w/ice   []w/heat  Position:seated  Location:(R) UT/scalenes    []  Traction: [] Cervical       []Lumbar                       [] Prone          []Supine                       []Intermittent   []Continuous Lbs:  [] before manual  [] after manual    []  Ultrasound: []Continuous   [] Pulsed                           []1MHz   []3MHz W/cm2:  Location:    []  Iontophoresis with dexamethasone         Location: [] Take home patch   [] In clinic    []  Ice     []  heat  []  Ice massage  []  Laser   []  Anodyne Position:  Location:    []  Laser with stim  []  Other:  Position:  Location:    []  Vasopneumatic Device Pressure:       [] lo [] med [] hi   Temperature: [] lo [] med [] hi   [x] Skin assessment post-treatment:  [x]intact []redness- no adverse reaction    []redness - adverse reaction:      min []Eval                  []Re-Eval       20 min Therapeutic Exercise:  [] See flow sheet :   Rationale: increase ROM and increase strength to improve the patients ability to perform ADL      min Therapeutic Activity:  []  See flow sheet :   Rationale:   to improve the patients ability to       min Neuromuscular Re-education:  []  See flow sheet :   Rationale:   to improve the patients ability to     15 min Manual Therapy: CSP TX  Passive  Stretch (B) UT/tripper  Point  Release  scalenes   Rationale: decrease pain, increase ROM, increase tissue extensibility and decrease edema  to perform ADL      min Gait Training:  ___ feet with ___ device on level surfaces with ___ level of assist   Rationale: With   [x] TE   [] TA   [] neuro   [] other: Patient Education: [x] Review HEP    [] Progressed/Changed HEP based on:   [] positioning   [] body mechanics   [] transfers   [] heat/ice application    [] other:      Other Objective/Functional Measures:  Tightness  (R) UT/scalenes    Pain Level (0-10 scale) post treatment: 1-2    ASSESSMENT/Changes in Function: Fair  Response  To each there ex. Pt  Requested  To be on hold  Until  He  Se  MD for  (R) shoulder  Pain due to that he don't  Want to used  Up all his  Insurance  For  PT. Patient will continue to benefit from skilled PT services to address functional mobility deficits, address ROM deficits, address strength deficits, analyze and address soft tissue restrictions, analyze and cue movement patterns and instruct in home and community integration to attain remaining goals.      [x]  See Plan of Care  []  See progress note/recertification  []  See Discharge Summary         Progress towards goals / Updated goals:  1 patient will have established and be I with HEP to aid with progression of skilled PT program                         EVAL issued                         [id number]  Met  11/8/17                         2 patient will have pain 3/10 to aid with increase tolerance to ADLS and activities                         EVAL [id number] patient will have FOTO improved to  59 to show increase tolerance to ADLS and activities including his work demands                         EVAL [address] [facility] [address] be accomplished in 10 treatments:                         0 patient will have pain 1/10 to aid with increase tolerance to ADLS and activities                         EVAL 5                         CURRENT                         2 patient will have FOTO improved to  65 to show increase tolerance to ADLS and activities including his work demands                         EVAL [id number] patient will have AROM Csp ROT B 40 and FF 35 and BB 30 to aid with increase tolerance to activities and ADLS                         EVAL AROM Csp ROT R/L 20/25 and FF 28  BB 17                         CURRENT                         4 patient will report overall 50% improvement to aid with increase tolerance to his work demands                         EVAL                                       CURRENT    PLAN  []  Upgrade activities as tolerated     [x]  Continue plan of care  []  Update interventions per flow sheet       []  Discharge due to:_  []  Other:_      [address] [facility] 11/8/2017  2:16 PM    Future Appointments  Date Time Provider [facility]   11/21/2017 1:30 PM Sara Hwang MD [place] [id number]   12/4/2017 2:00 PM Jamir Lance MD [address] [place] [address]   3/6/2018 7:45 AM [facility] NURSE [facility] SCHED   3/13/2018 8:15 AM Tobias Mendes MD [facility]

## 2017-11-18 NOTE — PROGRESS NOTES
Corbin Allred is a 50 y.o.  male and presents with Hypogonadism (f/u) and Insomnia      SUBJECTIVE:  Pt has been under a lot of stress in his personal life. He feels anxious at times and has had difficulty sleeping. He has been using ETOH and Xanax at times to get to sleep. He knows this is not a good practice and can lead to overdose. Pt's hypogonadism has not been well controlled on topical testosterone. He is a good candidate for injection since his levels remain low and he has low energy and difficulty focusing at times. Respiratory ROS: negative for - shortness of breath  Cardiovascular ROS: negative for - chest pain    Current Outpatient Prescriptions   Medication Sig    zolpidem (AMBIEN) 10 mg tablet Take 1 Tab by mouth nightly as needed for Sleep. Max Daily Amount: 10 mg.    testosterone (FORTESTA) 10 mg/0.5 gram /actuation glpm 2 Pump(s) by TransDERmal route daily. Max Daily Amount: 2 Pump(s).  cyclobenzaprine (FLEXERIL) 10 mg tablet Take 1 Tab by mouth nightly.  omeprazole (PRILOSEC) 40 mg capsule take 1 capsule by mouth once daily    varenicline (CHANTIX STARTER MARGA) 0.5 mg (11)- 1 mg (42) DsPk Per Dose pack instructions    triamcinolone (NASACORT AQ) 55 mcg nasal inhaler 2 Sprays daily. No current facility-administered medications for this visit.           OBJECTIVE:  alert, well appearing, and in no distress  Visit Vitals    /74 (BP 1 Location: Left arm, BP Patient Position: Sitting)    Pulse 70    Temp 97.9 °F (36.6 °C) (Oral)    Resp 20    Ht 6' 5\" (1.956 m)    Wt 255 lb (115.7 kg)    SpO2 98%    BMI 30.24 kg/m2      well developed and well nourished      Labs:   Lab Results   Component Value Date/Time    Cholesterol, total 193 10/23/2017 08:34 AM    HDL Cholesterol 53 10/23/2017 08:34 AM    LDL, calculated 85.4 10/23/2017 08:34 AM    Triglyceride 273 10/23/2017 08:34 AM    CHOL/HDL Ratio 3.6 10/23/2017 08:34 AM     Lab Results   Component Value Date/Time    Prostate Specific Ag 0.4 10/23/2017 08:34 AM    Prostate Specific Ag 0.4 01/16/2017 07:57 AM    Prostate Specific Ag 0.4 11/03/2015 08:43 AM    Prostate Specific Ag 0.3 10/28/2014 08:09 AM    Prostate Specific Ag 0.4 10/22/2013 08:35 AM     Lab Results   Component Value Date/Time    Sodium 137 06/06/2017 08:37 AM    Potassium 4.2 06/06/2017 08:37 AM    Chloride 102 06/06/2017 08:37 AM    CO2 25 06/06/2017 08:37 AM    Anion gap 10 06/06/2017 08:37 AM    Glucose 85 06/06/2017 08:37 AM    BUN 16 06/06/2017 08:37 AM    Creatinine 0.87 06/06/2017 08:37 AM    BUN/Creatinine ratio 18 06/06/2017 08:37 AM    GFR est AA >60 06/06/2017 08:37 AM    GFR est non-AA >60 06/06/2017 08:37 AM    Calcium 9.0 06/06/2017 08:37 AM    Bilirubin, total 0.4 06/06/2017 08:37 AM    ALT (SGPT) 50 06/06/2017 08:37 AM    AST (SGOT) 23 06/06/2017 08:37 AM    Alk. phosphatase 47 06/06/2017 08:37 AM    Protein, total 7.0 06/06/2017 08:37 AM    Albumin 4.1 06/06/2017 08:37 AM    Globulin 2.9 06/06/2017 08:37 AM    A-G Ratio 1.4 06/06/2017 08:37 AM                  Discussed the patient's BMI with him. The BMI follow up plan is as follows: I have counseled this patient on diet and exercise regimens. Assessment/Plan      ICD-10-CM ICD-9-CM    1. Hypogonadism male E29.1 257.2 REFERRAL TO UROLOGY for further evaluation. Will treat with testosterone (FORTESTA) 10 mg/0.5 gram /actuation glpm for now    2. Primary insomnia F51.01 307.42 Will try zolpidem (AMBIEN) 10 mg tablet. Pt knows he cannot take ETOH with this medication      Follow-up Disposition:  Return in about 4 months (around 2/28/2018) for labs 1 week before. Reviewed plan of care. Patient has provided input and agrees with goals.

## 2017-11-21 ENCOUNTER — OFFICE VISIT (OUTPATIENT)
Dept: ORTHOPEDIC SURGERY | Age: 52
End: 2017-11-21

## 2017-11-21 DIAGNOSIS — M75.121 COMPLETE TEAR OF RIGHT ROTATOR CUFF: Primary | ICD-10-CM

## 2017-11-21 NOTE — PATIENT INSTRUCTIONS
Rotator Cuff Injury: Care Instructions  Your Care Instructions  The rotator cuff is a group of tendons and muscles around the shoulder that keeps the upper arm bone in place. It keeps the shoulder joint stable and allows you to raise and rotate your arm. Damage to the rotator cuff can be caused by overuse, a fall, or a direct blow to the shoulder area, which can tear the tendons. Over time, everyday wear can damage the tendons and make injury more likely. Treatment can depend upon the amount of damage to the tendons. In a younger person, surgery may be the first choice. But if you are older than 25, you likely have some wear on your rotator cuff. Surgery may not be the most effective treatment. Your doctor may have you try physical therapy and exercise first.  Follow-up care is a key part of your treatment and safety. Be sure to make and go to all appointments, and call your doctor if you are having problems. It's also a good idea to know your test results and keep a list of the medicines you take. How can you care for yourself at home? · Rest your shoulder as much as you can. If your doctor put your arm in a sling or shoulder immobilizer, wear it as directed. Do not take it off before your doctor tells you to. If it is too tight, loosen it. · Be safe with medicines. Read and follow all instructions on the label. ¨ If the doctor gave you a prescription medicine for pain, take it as prescribed. ¨ If you are not taking a prescription pain medicine, ask your doctor if you can take an over-the-counter medicine. · Put ice or a cold pack on your shoulder for 10 to 20 minutes at a time. Try to do this every 1 to 2 hours for the next 3 days (when you are awake). Put a thin cloth between the ice pack and your skin. · After 3 days, put a warm, wet towel on your shoulder. This is to relax the muscles and help blood flow.   · While holding a warm, wet towel on your shoulder, lean forward so your arm hangs freely, and gently swing your arm back and forth like a pendulum. You also can do this standing under a warm shower. · Do not do anything that makes your pain worse. · Follow your doctor's advice about whether you need physical therapy. When should you call for help? Call your doctor now or seek immediate medical care if:  ? · You have severe pain. ? · You cannot move your shoulder or arm. ? · You have tingling or numbness in your arm or hand. ? · Your arm or hand is cool or pale. ? Watch closely for changes in your health, and be sure to contact your doctor if:  ? · Your pain gets worse. ? · You have new or worse swelling in your arm or hand. ? · You do not get better as expected. Where can you learn more? Go to http://deni-freddy.info/. Enter [phone] in the search box to learn more about \"Rotator Cuff Injury: Care Instructions. \"  Current as of: March 21, 2017  Content Version: 11.4  © 2148-2800 Healthwise, [employer]. Care instructions adapted under license by [employer] (which disclaims liability or warranty for this information). If you have questions about a medical condition or this instruction, always ask your healthcare professional. [address] any warranty or liability for your use of this information.

## 2017-11-21 NOTE — PROGRESS NOTES
Julia Boone  1965   No chief complaint on file. HISTORY OF PRESENT ILLNESS  Julia Boone is a 50 y.o. male who presents today for reevaluation of right shoulder pain and to review his MRI results. Initial injury occurred about 2 months ago while throwing around heavy weights. He rates his pain 5/10 today. He continues to experience aching, throbbing pain. Pain is worse with overhead motions and reaching behind the back. Continues to have pain at night. Since problem started the pain has remained the same. Patient denies any fever, chills, chest pain, shortness of breath or calf pain. There are no new illness or injuries to report since last seen in the office. There are no changes to medications, allergies, family or social history. PHYSICAL EXAM:   There were no vitals taken for this visit. The patient is a well-developed, well-nourished male   in no acute distress. The patient is alert and oriented times three. The patient is alert and oriented times three. Mood and affect are normal.  LYMPHATIC: lymph nodes are not enlarged and are within normal limits  SKIN: normal in color and non tender to palpation. There are no bruises or abrasions noted. NEUROLOGICAL: Motor sensory exam is within normal limits. Reflexes are equal bilaterally.  There is normal sensation to pinprick and light touch  MUSCULOSKELETAL:  Examination Right shoulder   Skin Intact   AC joint tenderness -   Biceps tenderness -   Forward flexion/Elevation    Active abduction    Glenohumeral abduction 90   External rotation ROM 90   Internal rotation ROM 30   Apprehension -   Alberts Relocation -   Jerk -   Load and Shift -   Obriens -   Speeds -   Impingement sign +   Supraspinatus/Empty Can +   External Rotation Strength -, 5/5   Lift Off/Belly Press ++   Neurovascular Intact        IMAGING:   MRI of the right shoulder dated 11/7/17 was reviewed and read:   IMPRESSION:  Full thickness tearing of the subscapularis tendon with a few fibers noted still at the attachment. Bulk of the tendon is retracted to the level the glenoid. Interstitial tearing and tendinopathy infraspinatus and supraspinatus tendons. Large complex glenohumeral joint effusion. Large subacromial subdeltoid effusion with irregular bursal borders. Correlate for bursitis. Mild tendinopathy intra-articular long head biceps tendon with questionable extra-articular split tear. Moderate osteoarthritis acromioclavicular joint.     XR of the right shoulder dated 11/2/17 was reviewed and read:   sclerotic changes of the greater tuberosity with a type 3 acromion     IMPRESSION:      ICD-10-CM ICD-9-CM    1. Complete tear of right rotator cuff M75.121 727.61         PLAN:   1. I discussed the results of the MRI and the treatment options with the patient. I feel it would be reasonable for this patient to proceed with surgical repair of the tear. Patient would like to take some time to think about this. He will be provided with Hayley's card today. Risk factors include: n/a  2. No cortisone injection indicated today   3. No Physical/Occupational Therapy indicated today  4. No diagnostic test indicated today  5. No durable medical equipment indicated today  6. No referral indicated today   7. No medications indicated today  8. No Narcotic indicated today     RTC PRN  Follow-up Disposition: Not on File    Scribed by Neetu Davis [facility]) as dictated by Izzy Gustafson MD    I, Dr. Izzy Gustafson, confirm that all documentation is accurate.     Izzy Gustafson M.D.   [facility]

## 2017-12-04 ENCOUNTER — OFFICE VISIT (OUTPATIENT)
Dept: UROLOGY | Age: 52
End: 2017-12-04

## 2017-12-04 ENCOUNTER — HOSPITAL ENCOUNTER (OUTPATIENT)
Dept: LAB | Age: 52
Discharge: HOME | End: 2017-12-04
Payer: COMMERCIAL

## 2017-12-04 VITALS
OXYGEN SATURATION: 97 % | TEMPERATURE: 98.5 F | BODY MASS INDEX: 30.93 KG/M2 | SYSTOLIC BLOOD PRESSURE: 110 MMHG | WEIGHT: 254 LBS | HEART RATE: 91 BPM | HEIGHT: 76 IN | DIASTOLIC BLOOD PRESSURE: 80 MMHG

## 2017-12-04 DIAGNOSIS — Z79.890 ENCOUNTER FOR MONITORING TESTOSTERONE REPLACEMENT THERAPY: ICD-10-CM

## 2017-12-04 DIAGNOSIS — Z51.81 ENCOUNTER FOR MONITORING TESTOSTERONE REPLACEMENT THERAPY: ICD-10-CM

## 2017-12-04 DIAGNOSIS — E29.1 HYPOGONADISM MALE: ICD-10-CM

## 2017-12-04 DIAGNOSIS — N40.1 BENIGN PROSTATIC HYPERPLASIA WITH LOWER URINARY TRACT SYMPTOMS, SYMPTOM DETAILS UNSPECIFIED: ICD-10-CM

## 2017-12-04 DIAGNOSIS — E29.1 HYPOGONADISM MALE: Primary | ICD-10-CM

## 2017-12-04 LAB
BILIRUB UR QL STRIP: NEGATIVE
ERYTHROCYTE [DISTWIDTH] IN BLOOD BY AUTOMATED COUNT: 13.2 % (ref 11.6–14.5)
GLUCOSE UR-MCNC: NEGATIVE MG/DL
HCT VFR BLD AUTO: 44.7 % (ref 36–48)
HGB BLD-MCNC: 14.6 G/DL (ref 13–16)
KETONES P FAST UR STRIP-MCNC: NEGATIVE MG/DL
MCH RBC QN AUTO: 30.3 PG (ref 24–34)
MCHC RBC AUTO-ENTMCNC: 32.7 G/DL (ref 31–37)
MCV RBC AUTO: 92.7 FL (ref 74–97)
PH UR STRIP: 7 [PH] (ref 4.6–8)
PLATELET # BLD AUTO: 341 K/UL (ref 135–420)
PMV BLD AUTO: 11.3 FL (ref 9.2–11.8)
PROT UR QL STRIP: NEGATIVE
PSA SERPL-MCNC: 0.5 NG/ML (ref 0–4)
RBC # BLD AUTO: 4.82 M/UL (ref 4.7–5.5)
SP GR UR STRIP: 1.02 (ref 1–1.03)
UA UROBILINOGEN AMB POC: NORMAL (ref 0.2–1)
URINALYSIS CLARITY POC: CLEAR
URINALYSIS COLOR POC: YELLOW
URINE BLOOD POC: NORMAL
URINE LEUKOCYTES POC: NEGATIVE
URINE NITRITES POC: NEGATIVE
WBC # BLD AUTO: 9.9 K/UL (ref 4.6–13.2)

## 2017-12-04 PROCEDURE — 84153 ASSAY OF PSA TOTAL: CPT | Performed by: UROLOGY

## 2017-12-04 PROCEDURE — 84403 ASSAY OF TOTAL TESTOSTERONE: CPT | Performed by: UROLOGY

## 2017-12-04 PROCEDURE — 85027 COMPLETE CBC AUTOMATED: CPT | Performed by: UROLOGY

## 2017-12-04 NOTE — PROGRESS NOTES
[facility] Per Dr. Yoel Stewart draw lab today for PSA for BPH, CBC for Encounter for long use of Testosterone and Testicular Function Panel for Hypogonadism.

## 2017-12-04 NOTE — PATIENT INSTRUCTIONS
Hypogonadism: Care Instructions  Your Care Instructions    Men who have hypogonadism do not make enough testosterone. This hormone allows men to make sperm and to have normal physical male traits. The condition also is known as testosterone deficiency. It can lead to loss of sex drive, weakness, impotence, infertility, and weakened bones. Many things can cause this condition. Causes include injured testicles, certain medicines, an infection, and aging. Having a long-term health problem such as kidney or liver disease or being obese can cause it. So can surgery or radiation treatment for another health problem. It also can be present at birth. It is most often treated with testosterone hormone. You can get the hormone as a shot or through a patch or gel on the skin. Follow-up care is a key part of your treatment and safety. Be sure to make and go to all appointments, and call your doctor if you are having problems. It's also a good idea to know your test results and keep a list of the medicines you take. How can you care for yourself at home? · Take your medicines exactly as prescribed. Call your doctor if you think you are having a problem with your medicine. You will get more details on the specific medicines your doctor prescribes. · Follow your treatment plan. If you use testosterone hormones, follow your doctor's instructions. Hormones can help relieve many of the effects of this condition, such as impotence. But it may take weeks or months for your symptoms to improve. · Get plenty of exercise. And make sure to get plenty of calcium and vitamin D in your diet. Eat more dairy foods and green vegetables. They can help keep your bones from getting weak. · If you have a hard time dealing with this condition, talk to your doctor about joining a support group. Talking with others who have the same problems can help you cope. When should you call for help?   Call your doctor now or seek immediate medical care if:  ? · You have headaches. ? · You have problems with your vision. ? Watch closely for changes in your health, and be sure to contact your doctor if:  ? · You have trouble getting or keeping an erection. ? · You have a loss of body hair. ? · You feel weak or tired a lot of the time. ? · Your breasts are getting larger. ? · You do not get better as expected. Where can you learn more? Go to http://deni-freddy.info/. Enter 99 [id number] in the search box to learn more about \"Hypogonadism: Care Instructions. \"  Current as of: May 12, 2017  Content Version: 11.4  © 9059-4158 Healthwise, [employer]. Care instructions adapted under license by [employer] (which disclaims liability or warranty for this information). If you have questions about a medical condition or this instruction, always ask your healthcare professional. [address] any warranty or liability for your use of this information.

## 2017-12-04 NOTE — PROGRESS NOTES
Mr. Lisa Mc has a reminder for a \"due or due soon\" health maintenance. I have asked that he contact his primary care provider for follow-up on this health maintenance.

## 2017-12-04 NOTE — MR AVS SNAPSHOT
Visit Information Date & Time Provider Department Dept. Phone Encounter #  
 12/4/2017  2:00 PM Fariha Driver, [facility] [id number] Your Appointments 3/6/2018  7:45 AM  
LAB with [facility] ([facility] [place] [facility]) Appt Note: 4 mo f/u lab [address] [address]  
[phone]  
  
   
 [address], [place] [facility]  
  
    
 3/13/2018  8:15 AM  
Office Visit with Margo Solitario MD  
[facility] Appt Note: 4 mo f/u  
 [address], [place] [address] [address]  
[phone]  
  
   
 [address], [address] Upcoming Health Maintenance Date Due Pneumococcal 19-64 Medium Risk (1 of 1 - PPSV23) 11/2/1986 DTaP/Tdap/Td series (2 - Td) 8/6/2015 Influenza Age 5 to Adult 8/1/2017 FOBT Q 1 YEAR AGE 50-75 11/2/2017 Allergies as of 12/4/2017  Review Complete On: 12/4/2017 By: Fariha Driver MD  
 No Known Allergies Current Immunizations  Never Reviewed Name Date Influenza Vaccine Whole 8/6/2010 TDAP Vaccine 8/6/2005 Not reviewed this visit You Were Diagnosed With   
  
 Codes Comments Hypogonadism male    -  Primary ICD-10-CM: E29.1 ICD-9-CM: 257.2 Benign prostatic hyperplasia with lower urinary tract symptoms, symptom details unspecified     ICD-10-CM: N40.1 ICD-9-CM: 600.01 Encounter for monitoring testosterone replacement therapy     ICD-10-CM: Z51.81, [id number] ICD-9-CM: V58.83, V58.69 Vitals BP Pulse Temp Height(growth percentile) Weight(growth percentile) SpO2  
 110/80 (BP 1 Location: Left arm, BP Patient Position: Sitting) 91 98.5 °F (36.9 °C) (Oral) 6' 4\" (1.93 m) 254 lb (115.2 kg) 97% BMI Smoking Status 30.92 kg/m2 Current Every Day Smoker Vitals History BMI and BSA Data Body Mass Index Body Surface Area 30.92 kg/m 2 2.49 m 2 Preferred Pharmacy Pharmacy Name Phone [address], [address] [place] [address] [phone] Your Updated Medication List  
  
   
This list is accurate as of: 12/4/17  2:53 PM.  Always use your most recent med list.  
  
  
  
  
 cyclobenzaprine 10 mg tablet Commonly known as:  FLEXERIL Take 1 Tab by mouth nightly. NASACORT AQ 55 mcg nasal inhaler Generic drug:  triamcinolone 2 Sprays daily. omeprazole 40 mg capsule Commonly known as:  PRILOSEC  
take 1 capsule by mouth once daily  
  
 testosterone 10 mg/0.5 gram /actuation Glpm  
Commonly known as:  FORTESTA  
2 Pump(s) by TransDERmal route daily. Max Daily Amount: 2 Pump(s). varenicline 0.5 mg (11)- 1 mg (42) Dspk Commonly known as:  CHANTIX STARTER MARGA Per Dose pack instructions  
  
 zolpidem 10 mg tablet Commonly known as:  AMBIEN Take 1 Tab by mouth nightly as needed for Sleep. Max Daily Amount: 10 mg. We Performed the Following AMB POC URINALYSIS DIP STICK AUTO W/O MICRO [37393 CPT(R)] SC COLLECTION VENOUS BLOOD,VENIPUNCTURE [id number] CPT(R)] To-Do List   
 12/04/2017 Lab:  CBC W/O DIFF   
  
 12/04/2017 Lab:  PSA, DIAGNOSTIC (PROSTATE SPECIFIC AG) 12/04/2017 Lab:  TESTICULAR FUNCTION PANEL Patient Instructions Hypogonadism: Care Instructions Your Care Instructions Men who have hypogonadism do not make enough testosterone. This hormone allows men to make sperm and to have normal physical male traits. The condition also is known as testosterone deficiency. It can lead to loss of sex drive, weakness, impotence, infertility, and weakened bones. Many things can cause this condition. Causes include injured testicles, certain medicines, an infection, and aging. Having a long-term health problem such as kidney or liver disease or being obese can cause it.  So can surgery or radiation treatment for another health problem. It also can be present at birth. It is most often treated with testosterone hormone. You can get the hormone as a shot or through a patch or gel on the skin. Follow-up care is a key part of your treatment and safety. Be sure to make and go to all appointments, and call your doctor if you are having problems. It's also a good idea to know your test results and keep a list of the medicines you take. How can you care for yourself at home? · Take your medicines exactly as prescribed. Call your doctor if you think you are having a problem with your medicine. You will get more details on the specific medicines your doctor prescribes. · Follow your treatment plan. If you use testosterone hormones, follow your doctor's instructions. Hormones can help relieve many of the effects of this condition, such as impotence. But it may take weeks or months for your symptoms to improve. · Get plenty of exercise. And make sure to get plenty of calcium and vitamin D in your diet. Eat more dairy foods and green vegetables. They can help keep your bones from getting weak. · If you have a hard time dealing with this condition, talk to your doctor about joining a support group. Talking with others who have the same problems can help you cope. When should you call for help? Call your doctor now or seek immediate medical care if: 
? · You have headaches. ? · You have problems with your vision. ? Watch closely for changes in your health, and be sure to contact your doctor if: 
? · You have trouble getting or keeping an erection. ? · You have a loss of body hair. ? · You feel weak or tired a lot of the time. ? · Your breasts are getting larger. ? · You do not get better as expected. Where can you learn more? Go to http://deni-freddy.info/. Enter 99 357495 in the search box to learn more about \"Hypogonadism: Care Instructions. \" 
 Current as of: May 12, 2017 Content Version: 11.4 © 5409-8880 Healthwise, [employer]. Care instructions adapted under license by [employer] (which disclaims liability or warranty for this information). If you have questions about a medical condition or this instruction, always ask your healthcare professional. [address] any warranty or liability for your use of this information. Introducing \[facility] [place] [facility]\"" & HEALTH SERVICES! Beto Davidaliya introduces [employer]ters patient portal. Now you can access parts of your medical record, email your doctor's office, and request medication refills online. 1. In your internet browser, go to [email or web address] 2. Click on the First Time User? Click Here link in the Sign In box. You will see the New Member Sign Up page. 3. Enter your [employer]ters Access Code exactly as it appears below. You will not need to use this code after youve completed the sign-up process. If you do not sign up before the expiration date, you must request a new code. · [employer] Access Code: [email or web address] Expires: 1/16/2018  9:53 AM 
 
4. Enter the last four digits of your Social Security Number (xxxx) and Date of Birth (mm/dd/yyyy) as indicated and click Submit. You will be taken to the next sign-up page. 5. Create a Context Matters ID. This will be your [employer] Matters login ID and cannot be changed, so think of one that is secure and easy to remember. 6. Create a [employer] password. You can change your password at any time. 7. Enter your Password Reset Question and Answer. This can be used at a later time if you forget your password. 8. Enter your e-mail address. You will receive e-mail notification when new information is available in [address] 9. Click Sign Up. You can now view and download portions of your medical record. 10. Click the Download Summary menu link to download a portable copy of your medical information. If you have questions, please visit the Frequently Asked Questions section of the SwingShot website. Remember, Swing[employer]ot is NOT to be used for urgent needs. For medical emergencies, dial 911. Now available from your iPhone and Android! Please provide this summary of care documentation to your next provider. Your primary care clinician is listed as JONATHAN HORN. If you have any questions after today's visit, please call [phone].

## 2017-12-04 NOTE — PROGRESS NOTES
Yesika Christine 50 y.o. male     Mr. Eulalia Virgen seen today for evaluation and management of hypogonadism diagnosed and treated 1 year ago with application of testosterone gel to skin without appreciable therapeutic benefit   referred to urology for consideration of testosterone replacement therapy by injection of testosterone intramuscularly  Patient has no history of  tract disease, trauma, or surgery   Patient has easy fatigability, lethargy, erectile dysfunction and poor libido      Testosterone 144 in October 2017  PSA 0.4 in October 2017       Review of Systems:   CNS: No seizure headaches dizziness visual changes  Respiratory: No wheezing no shortness of breath no chest pain  Cardiovascular: No palpitations no angina  Intestinal: GERD  Urinary: No urgency frequency dysuria or hematuria  Skeletal: Chronic low back pain  Endocrine: No bone or joint pain  Other:                                                                                       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 30 Tab 1    zolpidem (AMBIEN) 10 mg tablet Take 1 Tab by mouth nightly as needed for Sleep. Max Daily Amount: 10 mg. 30 Tab 3    testosterone (FORTESTA) 10 mg/0.5 gram /actuation glpm 2 Pump(s) by TransDERmal route daily. Max Daily Amount: 2 Pump(s). 60 g 3    omeprazole (PRILOSEC) 40 mg capsule take 1 capsule by mouth once daily 90 Cap 2    triamcinolone (NASACORT AQ) 55 mcg nasal inhaler 2 Sprays daily.       varenicline (CHANTIX STARTER MARGA) 0.5 mg (11)- 1 mg (42) DsPk Per Dose pack instructions 1 Dose Pack 0       Past Medical History:   Diagnosis Date    High cholesterol 9/13/2013    Sleep apnea       Past Surgical History:   Procedure Laterality Date    HX ORTHOPAEDIC      Broken Sturnum      Family History   Problem Relation Age of Onset    Hypertension Mother     Stroke Mother     Diabetes Mother     Heart Disease Mother     Heart Disease Father         Physical Examination: Well-nourished mature trim and fit appearing adult male in no apparent distress    Abdomen is nontender no palpable masses no organomegaly  Back-no percussion CVA tenderness on either side  No inguinal hernia or adenopathy  Penis is normal  Testes are normal in size shape and consistency bilaterally-no epididymal induration or tenderness on either side  Spermatic cords are palpably normal bilaterally  Scrotum is normal  Prostate by WHITLEY is rounded, smooth, benign in consistency and nontender-no induration no nodularity  No rectal masses tenderness or induration      Urinalysis: Negative dipstick/nitrite negative    Impression: Symptomatic hypogonadism with failed testosterone replacement therapy by application of testosterone gel to skin        Plan: CBC, PSA, testosterone level, FSH, LH, prolactin today    rtc 3 weeks-initiate TRT by intramuscular injection if approved by health plan        Cleveland Paredes MD  -electronically signed-    PLEASE NOTE:  This document has been produced using voice recognition software. Unrecognized errors in transcription may be present.

## 2017-12-05 LAB
FSH SERPL-ACNC: 1.2 MIU/ML (ref 1.5–12.4)
LH SERPL-ACNC: 3.4 MIU/ML (ref 1.7–8.6)
PROLACTIN SERPL-MCNC: 10.4 NG/ML (ref 4–15.2)
TESTOST SERPL-MCNC: 49 NG/DL (ref 264–916)

## 2017-12-14 ENCOUNTER — TELEPHONE (OUTPATIENT)
Dept: FAMILY MEDICINE CLINIC | Age: 52
End: 2017-12-14

## 2017-12-14 ENCOUNTER — HOSPITAL ENCOUNTER (OUTPATIENT)
Dept: PHYSICAL THERAPY | Age: 52
Discharge: HOME | End: 2017-12-14
Payer: COMMERCIAL

## 2017-12-14 DIAGNOSIS — M25.511 ACUTE PAIN OF RIGHT SHOULDER: ICD-10-CM

## 2017-12-14 DIAGNOSIS — M54.2 NECK PAIN: Primary | ICD-10-CM

## 2017-12-14 PROCEDURE — 97140 MANUAL THERAPY 1/> REGIONS: CPT

## 2017-12-14 PROCEDURE — 97032 APPL MODALITY 1+ESTIM EA 15: CPT

## 2017-12-14 PROCEDURE — 97110 THERAPEUTIC EXERCISES: CPT

## 2017-12-14 NOTE — TELEPHONE ENCOUNTER
Pt would like to make a request to have physical therapy on the his right shoulder. Pt states that he has a tear in the right shoulder. Pt want to go to the facility downstairs. Please call and advise pt once completed. Allegra Heredia

## 2017-12-14 NOTE — PROGRESS NOTES
PT DAILY TREATMENT NOTE      Patient Name: Julio Alfred  Date:2017  : 1965  [x]  Patient  Verified  Payor: Elvira Gillis / Plan:  [facility] / Product Type: PPO /    In time: 2:00  Out time:3:03  Total Treatment Time (min):  45  Visit #: 4 of 10    Treatment Area: Cervicalgia [M54.2]    SUBJECTIVE  Pain Level (0-10 scale): 5  Any medication changes, allergies to medications, adverse drug reactions, diagnosis change, or new procedure performed?: [x] No    [] Yes (see summary sheet for update)  Subjective functional status/changes:   [] No changes reported  Im  Moving  It.     OBJECTIVE    Modality rationale: decrease edema, decrease inflammation, decrease pain and increase tissue extensibility to improve the patients ability to perform ADL    Min Type Additional Details    [] Estim:  []Unatt       []IFC  []Premod                        []Other:  []w/ice   []w/heat  Position:  Location:   8 [x] Estim: [x]Att    []TENS instruct  []NMES                    [x]Other: LTO []w/US   []w/ice   []w/heat  Position:seated  Location:(R) UT    []  Traction: [] Cervical       []Lumbar                       [] Prone          []Supine                       []Intermittent   []Continuous Lbs:  [] before manual  [] after manual    []  Ultrasound: []Continuous   [] Pulsed                           []1MHz   []3MHz W/cm2:  Location:    []  Iontophoresis with dexamethasone         Location: [] Take home patch   [] In clinic    []  Ice     []  heat  []  Ice massage  []  Laser   []  Anodyne Position:  Location:    []  Laser with stim  []  Other:  Position:  Location:    []  Vasopneumatic Device Pressure:       [] lo [] med [] hi   Temperature: [] lo [] med [] hi   [x] Skin assessment post-treatment:  [x]intact []redness- no adverse reaction    []redness - adverse reaction:      min []Eval                  []Re-Eval       27 min Therapeutic Exercise:  [x] See flow sheet :   Rationale: increase ROM and increase strength to improve the patients ability to perform ADL      min Therapeutic Activity:  []  See flow sheet :   Rationale:  to improve the patients ability to       min Neuromuscular Re-education:  []  See flow sheet :   Rationale:   to improve the patients ability to     10 min Manual Therapy:  CSP TX  With passive  Stretch  (R) UT   Rationale: decrease pain, increase ROM, increase tissue extensibility and decrease edema  to perform ADL      min Gait Training:  ___ feet with ___ device on level surfaces with ___ level of assist   Rationale: With   [x] TE   [] TA   [] neuro   [] other: Patient Education: [x] Review HEP    [] Progressed/Changed HEP based on:   [] positioning   [] body mechanics   [] transfers   [] heat/ice application    [] other:      Other Objective/Functional Measures:  Reviewed  MRI  Finding  With pt. Pain Level (0-10 scale) post treatment: 0    ASSESSMENT/Changes in Function: Responded  Fairly  Well  To each there ex. Patient will continue to benefit from skilled PT services to address functional mobility deficits, address ROM deficits, address strength deficits, analyze and address soft tissue restrictions, analyze and cue movement patterns and instruct in home and community integration to attain remaining goals.      [x]  See Plan of Care  []  See progress note/recertification  []  See Discharge Summary         Progress towards goals / Updated goals:  1 patient will have established and be I with HEP to aid with progression of skilled PT program                         JEFF foote                         UTZPJEF  Met  11/8/17                         2 patient will have pain 3/10 to aid with increase tolerance to ADLS and activities                         EVAL 5                         CURRENT                         3 patient will have FOTO improved to  59 to show increase tolerance to ADLS and activities including his work demands  [facility] [address] be accomplished in 10 treatments:                         5 patient will have pain 1/10 to aid with increase tolerance to ADLS and activities                         EVAL 5                         CURRENT                         2 patient will have FOTO improved to  65 to show increase tolerance to ADLS and activities including his work demands                         EVAL 0494 26 46 14 patient will have AROM Csp ROT B 40 and FF 35 and BB 30 to aid with increase tolerance to activities and ADLS                         EVAL AROM Csp ROT R/L 20/25 and FF 28  BB 17                         CURRENT                         4 patient will report overall 50% improvement to aid with increase tolerance to his work demands                         EVAL                                       CURRENT       PLAN  []  Upgrade activities as tolerated     [x]  Continue plan of care  []  Update interventions per flow sheet       []  Discharge due to:_  [x]  Other:_ REASSESS ROM NV     Sayra Garduno, PTA 12/14/2017  2:07 PM    Future Appointments  Date Time Provider Sandra Garcia   12/27/2017 1:30 PM Elvira Harris MD [facility]   3/6/2018 7:45 AM [facility] NURSE [facility]   3/13/2018 8:15 AM Lavelle Scott MD 11 [facility]

## 2017-12-19 NOTE — TELEPHONE ENCOUNTER
LM for Pt that Dr Mariella Aly placed and [facility] Ph# [phone].

## 2017-12-22 ENCOUNTER — DOCUMENTATION ONLY (OUTPATIENT)
Dept: UROLOGY | Age: 52
End: 2017-12-22

## 2017-12-27 ENCOUNTER — OFFICE VISIT (OUTPATIENT)
Dept: UROLOGY | Age: 52
End: 2017-12-27

## 2017-12-27 VITALS
WEIGHT: 254 LBS | HEIGHT: 76 IN | OXYGEN SATURATION: 95 % | TEMPERATURE: 98.1 F | SYSTOLIC BLOOD PRESSURE: 115 MMHG | DIASTOLIC BLOOD PRESSURE: 79 MMHG | HEART RATE: 103 BPM | BODY MASS INDEX: 30.93 KG/M2

## 2017-12-27 DIAGNOSIS — E29.1 HYPOGONADISM MALE: Primary | ICD-10-CM

## 2017-12-27 RX ORDER — TESTOSTERONE CYPIONATE 200 MG/ML
200 INJECTION INTRAMUSCULAR
COMMUNITY
End: 2021-01-18 | Stop reason: ALTCHOICE

## 2017-12-27 RX ORDER — TESTOSTERONE CYPIONATE 200 MG/ML
200 INJECTION INTRAMUSCULAR ONCE
Qty: 1 ML | Refills: 0
Start: 2017-12-27 | End: 2017-12-27

## 2017-12-27 NOTE — PROGRESS NOTES
Roberto Carlos Jones 50 y.o. male     Mr. Floridalma Zayas seen today for hypogonadism follow-up-testosterone replacement therapy has been approved by patient's health plan-TRT by spouse planned in the future  Here today for initiation of TRT by IM injection of testosterone-      hypogonadism diagnosed and treated 1 year ago with application of testosterone gel to skin without appreciable therapeutic benefit   referred to urology for consideration of testosterone replacement therapy by injection of testosterone intramuscularly  Patient has no history of  tract disease, trauma, or surgery   Patient has easy fatigability, lethargy, erectile dysfunction and poor libido        Testosterone 144 in October 2017  PSA 0.4 in October 2017     Testosterone function panel 5 December 2017:  Testosterone 49  FSH 1.2  LH 3.4  Prolactin 10.4    PSA 0.5 on 4 December 2017  Hematocrit 44.7  \"        Review of Systems:   CNS: No seizure headaches dizziness visual changes  Respiratory: No wheezing no shortness of breath no chest pain  Cardiovascular: No palpitations no angina  Intestinal: GERD  Urinary: No urgency frequency dysuria or hematuria  Skeletal: Chronic low back pain  Endocrine: No bone or joint pain  Other:                                                                                       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  zolpidem (AMBIEN) 10 mg tablet Take 1 Tab by mouth nightly as needed for Sleep. Max Daily Amount: 10 mg. 30 Tab 3    omeprazole (PRILOSEC) 40 mg capsule take 1 capsule by mouth once daily 90 Cap 2    triamcinolone (NASACORT AQ) 55 mcg nasal inhaler 2 Sprays daily.  cyclobenzaprine (FLEXERIL) 10 mg tablet Take 1 Tab by mouth nightly.  30 Tab 1    testosterone (FORTESTA) 10 mg/0.5 gram /actuation glpm 2 Pump(s) by TransDERmal route daily. Max Daily Amount: 2 Pump(s). 60 g 3    varenicline (CHANTIX STARTER MARGA) 0.5 mg (11)- 1 mg (42) DsPk Per Dose pack instructions 1 Dose Pack 0       Past Medical History:   Diagnosis Date    High cholesterol 9/13/2013    Sleep apnea       Past Surgical History:   Procedure Laterality Date    HX ORTHOPAEDIC      Broken Sturnum      Family History   Problem Relation Age of Onset    Hypertension Mother     Stroke Mother     Diabetes Mother     Heart Disease Mother     Heart Disease Father        Physical Examination: Well-nourished mature male in no apparent distress        Impression: Hypogonadism        Plan: Testosterone 200 mg IM right buttock today     described discussed results of testosterone panel testing    rtc 3 weeks with spouse for instruction in technique of intramuscular testosterone injection          More than 1/2 of this 15 minute visit was spent in counselling and coordination of care, as described above. Amalia Dotson MD  -electronically signed-    PLEASE NOTE:  This document has been produced using voice recognition software. Unrecognized errors in transcription may be present.

## 2017-12-27 NOTE — PROGRESS NOTES
[facility]BV Per Dr. Cady Lovell give Testosterone 200 mg/ml now. Mr. Kendal Vega has a reminder for a \"due or due soon\" health maintenance. I have asked that he contact his primary care provider for follow-up on this health maintenance.

## 2017-12-27 NOTE — MR AVS SNAPSHOT
Visit Information Date & Time Provider Department Dept. Phone Encounter #  
 12/27/2017  1:30 PM Fariha Driver, [address] [facility] [phone] [id number] Follow-up Instructions Return in about 3 weeks (around 1/17/2018) for trt. Your Appointments 1/17/2018  1:30 PM  
Office Visit with Fariha Driver MD  
[facility] [place] Mauricio) Appt Note: 2nd TRT  
 [address] [place] [address] [address]  
[phone] [address] [address]  
  
    
 3/6/2018  7:45 AM  
LAB with [facility] ([place]) Appt Note: 4 mo f/u lab [address] [facility] [address] [address]  
[phone]  
  
   
 [address], [facility]  
  
    
 3/13/2018  8:15 AM  
Office Visit with Margo Solitario MD  
[place] [address] [facility]) Appt Note: 4 mo f/u  
 [address], [place] [address] [address]  
[phone]  
  
   
 [address],  [address] Upcoming Health Maintenance Date Due Pneumococcal 19-64 Medium Risk (1 of 1 - PPSV23) 11/2/1986 DTaP/Tdap/Td series (2 - Td) 8/6/2015 Influenza Age 5 to Adult 8/1/2017 FOBT Q 1 YEAR AGE 50-75 11/2/2017 Allergies as of 12/27/2017  Review Complete On: 12/27/2017 By: Fariha Driver MD  
 No Known Allergies Current Immunizations  Never Reviewed Name Date Influenza Vaccine Whole 8/6/2010 TDAP Vaccine 8/6/2005 Not reviewed this visit You Were Diagnosed With   
  
 Codes Comments Hypogonadism male    -  Primary ICD-10-CM: E29.1 ICD-9-CM: 257.2 Vitals BP Pulse Temp Height(growth percentile) Weight(growth percentile) SpO2  
 115/79 (BP 1 Location: Left arm, BP Patient Position: Sitting) (!) 103 98.1 °F (36.7 °C) 6' 4\" (1.93 m) 254 lb (115.2 kg) 95% BMI Smoking Status 30.92 kg/m2 Current Every Day Smoker Vitals History BMI and BSA Data Body Mass Index Body Surface Area 30.92 kg/m 2 2.49 m 2 Preferred Pharmacy Pharmacy Name Phone [address], [address] [place] [address] [phone] Your Updated Medication List  
  
   
This list is accurate as of: 12/27/17  3:43 PM.  Always use your most recent med list.  
  
  
  
  
 cyclobenzaprine 10 mg tablet Commonly known as:  FLEXERIL Take 1 Tab by mouth nightly. NASACORT AQ 55 mcg nasal inhaler Generic drug:  triamcinolone 2 Sprays daily. omeprazole 40 mg capsule Commonly known as:  PRILOSEC  
take 1 capsule by mouth once daily  
  
 testosterone 10 mg/0.5 gram /actuation Glpm  
Commonly known as:  FORTESTA  
2 Pump(s) by TransDERmal route daily. Max Daily Amount: 2 Pump(s). * testosterone cypionate 200 mg/mL injection Commonly known as:  DEPOTESTOTERONE CYPIONATE  
200 mg by IntraMUSCular route every twenty-one (21) days. * testosterone cypionate 200 mg/mL injection Commonly known as:  DEPOTESTOTERONE CYPIONATE 1 mL by IntraMUSCular route once for 1 dose. Max Daily Amount: 200 mg.  
  
 varenicline 0.5 mg (11)- 1 mg (42) Dspk Commonly known as:  CHANTIX STARTER MARGA Per Dose pack instructions  
  
 zolpidem 10 mg tablet Commonly known as:  AMBIEN Take 1 Tab by mouth nightly as needed for Sleep. Max Daily Amount: 10 mg.  
  
 * Notice: This list has 2 medication(s) that are the same as other medications prescribed for you. Read the directions carefully, and ask your doctor or other care provider to review them with you. We Performed the Following IA INJ TESTOSTERONE CYPIONATE [ \[facility] [place] [facility]\""] IA THER/PROPH/DIAG INJECTION, SUBCUT/IM [id number] CPT(R)] Follow-up Instructions Return in about 3 weeks (around 1/17/2018) for trt. Patient Instructions Testosterone (By injection) Testosterone (jorge luis-TOS-ter-one) Treats low testosterone levels. Also treats breast cancer in women and delayed puberty in male teenagers. Brand Name(s): Aveed, Delatestryl, Depo-Testosterone, Depo-Testosterone Novaplus, PremierPro RX Depo-Testosterone, Testone CIK There may be other brand names for this medicine. When This Medicine Should Not Be Used: This medicine is not right for everyone. You should not receive it if you had an allergic reaction to testosterone, benzyl benzoate, or refined castor oil. A man should not receive this medicine if he has breast cancer or prostate cancer. A woman should not receive this medicine if she is pregnant or breastfeeding. How to Use This Medicine:  
Injectable · Your doctor will prescribe your exact dose and tell you how often it should be given. This medicine is given as a shot into one of your muscles. It is usually given in the buttocks. · A nurse or other health provider will give you this medicine. · This medicine should come with a Medication Guide. Ask your pharmacist for a copy if you do not have one. · Missed dose: Call your doctor or pharmacist for instructions. Drugs and Foods to Avoid: Ask your doctor or pharmacist before using any other medicine, including over-the-counter medicines, vitamins, and herbal products. · Some medicines can affect how testosterone works. Tell your doctor if you are using any of the following:  
¨ Oxyphenbutazone ¨ Blood thinner (including warfarin) ¨ Insulin or diabetes medicine that you take by mouth ¨ Steroid medicine (including dexamethasone, hydrocortisone, methylprednisolone, prednisolone, prednisone) Warnings While Using This Medicine: · It is not safe to take this medicine during pregnancy. It could harm an unborn baby. Tell your doctor right away if you become pregnant.  
· Tell your doctor if you have kidney disease, liver disease, diabetes, an enlarged prostate, heart disease, high cholesterol, lung disease, sleep apnea, or a history of heart attack or stroke. · This medicine may cause the following problems: 
¨ Serious lung reaction called pulmonary oil embolism (may be life-threatening) ¨ Increased risk of prostate cancer ¨ Increased numbers of red blood cells ¨ Blood clot in your leg or lung ¨ Slow growth in children ¨ Increased risk of heart attack or stroke ¨ Lower sperm count (with large doses) · This medicine can be habit-forming. Do not use more than your prescribed dose. Call your doctor if you think your medicine is not working. · Your doctor will do lab tests at regular visits to check on the effects of this medicine. Keep all appointments. Possible Side Effects While Using This Medicine:  
Call your doctor right away if you notice any of these side effects: · Allergic reaction: Itching or hives, swelling in your face or hands, swelling or tingling in your mouth or throat, chest tightness, trouble breathing · Change in how much or how often you urinate, trouble urinating · Chest pain, cough, trouble breathing, dizziness, tightening of your throat, unusual sweating · Dark urine or pale stools, nausea, vomiting, loss of appetite, stomach pain, yellow skin or eyes · Pain, redness, or swelling in your arm or leg · Swelling in your hands, ankles, or feet · Unusual bleeding, bruising, or weakness If you notice these less serious side effects, talk with your doctor: · Acne, hoarse voice, facial hair growth (women) · Changes in menstrual periods · More erections than usual or erections that last a long time · Pain or redness where the shot was given · Swollen breasts (men) If you notice other side effects that you think are caused by this medicine, tell your doctor. Call your doctor for medical advice about side effects. You may report side effects to FDA at 4-998-DOR-6851 © 2017 [address] Information is for End User's use only and may not be sold, redistributed or otherwise used for commercial purposes. The above information is an  only. It is not intended as medical advice for individual conditions or treatments. Talk to your doctor, nurse or pharmacist before following any medical regimen to see if it is safe and effective for you. Introducing [facility]! Kun Ellis introduces Active [employer]points patient portal. Now you can access parts of your medical record, email your doctor's office, and request medication refills online. 1. In your internet browser, go to [email or web address] 2. Click on the First Time User? Click Here link in the Sign In box. You will see the New Member Sign Up page. 3. Enter your Active Endpoints Access Code exactly as it appears below. You will not need to use this code after youve completed the sign-up process. If you do not sign up before the expiration date, you must request a new code. · Active Endpoints Access Code: [email or web address] Expires: 1/16/2018  9:53 AM 
 
4. Enter the last four digits of your Social Security Number (xxxx) and Date of Birth (mm/dd/yyyy) as indicated and click Submit. You will be taken to the next sign-up page. 5. Create a Active Endpoints ID. This will be your Active Endpoints login ID and cannot be changed, so think of one that is secure and easy to remember. 6. Create a [employer] password. You can change your password at any time. 7. Enter your Password Reset Question and Answer. This can be used at a later time if you forget your password. 8. Enter your e-mail address. You will receive e-mail notification when new information is available in [address] 9. Click Sign Up. You can now view and download portions of your medical record. 10. Click the Download Summary menu link to download a portable copy of your medical information. If you have questions, please visit the Frequently Asked Questions section of the [employer] website. Remember, [employer] is NOT to be used for urgent needs. For medical emergencies, dial 911. Now available from your iPhone and Android! Please provide this summary of care documentation to your next provider. Your primary care clinician is listed as JONATHAN HORN. If you have any questions after today's visit, please call [phone].

## 2018-01-17 ENCOUNTER — OFFICE VISIT (OUTPATIENT)
Dept: UROLOGY | Age: 53
End: 2018-01-17

## 2018-01-17 VITALS
SYSTOLIC BLOOD PRESSURE: 116 MMHG | WEIGHT: 254 LBS | DIASTOLIC BLOOD PRESSURE: 77 MMHG | TEMPERATURE: 98.1 F | OXYGEN SATURATION: 93 % | HEIGHT: 76 IN | HEART RATE: 99 BPM | BODY MASS INDEX: 30.93 KG/M2

## 2018-01-17 DIAGNOSIS — E29.1 HYPOGONADISM MALE: Primary | ICD-10-CM

## 2018-01-17 RX ORDER — TESTOSTERONE CYPIONATE 200 MG/ML
200 INJECTION INTRAMUSCULAR ONCE
Qty: 1 ML | Refills: 0
Start: 2018-01-18 | End: 2018-01-18

## 2018-01-17 NOTE — PROGRESS NOTES
Mr. Issac Belle has a reminder for a \"due or due soon\" health maintenance. I have asked that he contact his primary care provider for follow-up on this health maintenance.

## 2018-01-17 NOTE — PATIENT INSTRUCTIONS
Hypogonadism: Care Instructions  Your Care Instructions    Men who have hypogonadism do not make enough testosterone. This hormone allows men to make sperm and to have normal physical male traits. The condition also is known as testosterone deficiency. It can lead to loss of sex drive, weakness, impotence, infertility, and weakened bones. Many things can cause this condition. Causes include injured testicles, certain medicines, an infection, and aging. Having a long-term health problem such as kidney or liver disease or being obese can cause it. So can surgery or radiation treatment for another health problem. It also can be present at birth. It is most often treated with testosterone hormone. You can get the hormone as a shot or through a patch or gel on the skin. Follow-up care is a key part of your treatment and safety. Be sure to make and go to all appointments, and call your doctor if you are having problems. It's also a good idea to know your test results and keep a list of the medicines you take. How can you care for yourself at home? · Take your medicines exactly as prescribed. Call your doctor if you think you are having a problem with your medicine. You will get more details on the specific medicines your doctor prescribes. · Follow your treatment plan. If you use testosterone hormones, follow your doctor's instructions. Hormones can help relieve many of the effects of this condition, such as impotence. But it may take weeks or months for your symptoms to improve. · Get plenty of exercise. And make sure to get plenty of calcium and vitamin D in your diet. Eat more dairy foods and green vegetables. They can help keep your bones from getting weak. · If you have a hard time dealing with this condition, talk to your doctor about joining a support group. Talking with others who have the same problems can help you cope. When should you call for help?   Call your doctor now or seek immediate medical care if:  ? · You have headaches. ? · You have problems with your vision. ? Watch closely for changes in your health, and be sure to contact your doctor if:  ? · You have trouble getting or keeping an erection. ? · You have a loss of body hair. ? · You feel weak or tired a lot of the time. ? · Your breasts are getting larger. ? · You do not get better as expected. Where can you learn more? Go to http://deni-freddy.info/. Enter [phone] [id number] in the search box to learn more about \"Hypogonadism: Care Instructions. \"  Current as of: May 12, 2017  Content Version: 11.4  © 3095-7293 Healthwise, [employer]. Care instructions adapted under license by [employer] (which disclaims liability or warranty for this information). If you have questions about a medical condition or this instruction, always ask your healthcare professional. [address] any warranty or liability for your use of this information.

## 2018-01-17 NOTE — MR AVS SNAPSHOT
[address] [facility] [place] [address] [address] 
[phone] Patient: Carson Jaime MRN: [id number] :1965 Visit Information Date & Time Provider Department Dept. Phone Encounter #  
 2018  1:30 PM Karen Lorenz [facility] [phone] [id number] Your Appointments 2018  1:15 PM  
Office Visit with Kennedy Pickett MD  
[facility]) Appt Note: 3rd TRT/Will bring Spouse [address] [place] [address] [address]  
[phone] [phone]  
  
    
 3/6/2018  7:45 AM  
LAB with [facility] ([facility]) Appt Note: 4 mo f/u lab 129 [facility] [address] [address]  
[phone]  
  
   
 [address]  
  
    
 3/13/2018  8:15 AM  
Office Visit with MD Jennifer [facility]) Appt Note: 4 mo f/u  
 [address], [facility] [address] [address]  
[phone]  
  
   
 [address],  [address] Upcoming Health Maintenance Date Due Pneumococcal 19-64 Medium Risk (1 of 1 - PPSV23) 1986 DTaP/Tdap/Td series (2 - Td) 2015 Influenza Age 5 to Adult 2017 FOBT Q 1 YEAR AGE 50-75 2017 Allergies as of 2018  Review Complete On: 2018 By: Sherri Gaxiola No Known Allergies Current Immunizations  Never Reviewed Name Date Influenza Vaccine Whole 2010 TDAP Vaccine 2005 Not reviewed this visit You Were Diagnosed With   
  
 Codes Comments Hypogonadism male    -  Primary ICD-10-CM: E29.1 ICD-9-CM: 257.2 Vitals  BP Pulse Temp Height(growth percentile) Weight(growth percentile) SpO2  
 116/77 (BP 1 Location: Left arm, BP Patient Position: Sitting) 99 98.1 °F (36.7 °C) (Oral) 6' 4\" (1.93 m) 254 lb (115.2 kg) 93% BMI Smoking Status 30.92 kg/m2 Current Every Day Smoker Vitals History BMI and BSA Data Body Mass Index Body Surface Area 30.92 kg/m 2 2.49 m 2 Preferred Pharmacy Pharmacy Name Phone [address], [address] [place] [address] [phone] Your Updated Medication List  
  
   
This list is accurate as of: 1/17/18  2:24 PM.  Always use your most recent med list.  
  
  
  
  
 cyclobenzaprine 10 mg tablet Commonly known as:  FLEXERIL Take 1 Tab by mouth nightly. NASACORT AQ 55 mcg nasal inhaler Generic drug:  triamcinolone 2 Sprays daily. omeprazole 40 mg capsule Commonly known as:  PRILOSEC  
take 1 capsule by mouth once daily  
  
 testosterone 10 mg/0.5 gram /actuation Glpm  
Commonly known as:  FORTESTA  
2 Pump(s) by TransDERmal route daily. Max Daily Amount: 2 Pump(s). testosterone cypionate 200 mg/mL injection Commonly known as:  DEPOTESTOTERONE CYPIONATE  
200 mg by IntraMUSCular route every twenty-one (21) days. varenicline 0.5 mg (11)- 1 mg (42) Dspk Commonly known as:  CHANTIX STARTER MARGA Per Dose pack instructions  
  
 zolpidem 10 mg tablet Commonly known as:  AMBIEN Take 1 Tab by mouth nightly as needed for Sleep. Max Daily Amount: 10 mg. Patient Instructions Hypogonadism: Care Instructions Your Care Instructions Men who have hypogonadism do not make enough testosterone. This hormone allows men to make sperm and to have normal physical male traits. The condition also is known as testosterone deficiency. It can lead to loss of sex drive, weakness, impotence, infertility, and weakened bones. Many things can cause this condition. Causes include injured testicles, certain medicines, an infection, and aging.  Having a long-term health problem such as kidney or liver disease or being obese can cause it. So can surgery or radiation treatment for another health problem. It also can be present at birth. It is most often treated with testosterone hormone. You can get the hormone as a shot or through a patch or gel on the skin. Follow-up care is a key part of your treatment and safety. Be sure to make and go to all appointments, and call your doctor if you are having problems. It's also a good idea to know your test results and keep a list of the medicines you take. How can you care for yourself at home? · Take your medicines exactly as prescribed. Call your doctor if you think you are having a problem with your medicine. You will get more details on the specific medicines your doctor prescribes. · Follow your treatment plan. If you use testosterone hormones, follow your doctor's instructions. Hormones can help relieve many of the effects of this condition, such as impotence. But it may take weeks or months for your symptoms to improve. · Get plenty of exercise. And make sure to get plenty of calcium and vitamin D in your diet. Eat more dairy foods and green vegetables. They can help keep your bones from getting weak. · If you have a hard time dealing with this condition, talk to your doctor about joining a support group. Talking with others who have the same problems can help you cope. When should you call for help? Call your doctor now or seek immediate medical care if: 
? · You have headaches. ? · You have problems with your vision. ? Watch closely for changes in your health, and be sure to contact your doctor if: 
? · You have trouble getting or keeping an erection. ? · You have a loss of body hair. ? · You feel weak or tired a lot of the time. ? · Your breasts are getting larger. ? · You do not get better as expected. Where can you learn more? Go to http://deni-freddy.info/. Enter 99 [id number] in the search box to learn more about \"Hypogonadism: Care Instructions. \" Current as of: May 12, 2017 Content Version: 11.4 © 9896-5161 Healthwise, [employer]. Care instructions adapted under license by [employer] (which disclaims liability or warranty for this information). If you have questions about a medical condition or this instruction, always ask your healthcare professional. [address] any warranty or liability for your use of this information. Introducing hospitals & HEALTH SERVICES! [facility] introduces Truly patient portal. Now you can access parts of your medical record, email your doctor's office, and request medication refills online. 1. In your internet browser, go to [email or web address] 2. Click on the First Time User? Click Here link in the Sign In box. You will see the New Member Sign Up page. 3. Enter your Truly Access Code exactly as it appears below. You will not need to use this code after youve completed the sign-up process. If you do not sign up before the expiration date, you must request a new code. · Truly Access Code: [email or web address] Expires: 4/17/2018  1:27 PM 
 
4. Enter the last four digits of your Social Security Number (xxxx) and Date of Birth (mm/dd/yyyy) as indicated and click Submit. You will be taken to the next sign-up page. 5. Create a Truly ID. This will be your Truly login ID and cannot be changed, so think of one that is secure and easy to remember. 6. Create a Truly password. You can change your password at any time. 7. Enter your Password Reset Question and Answer. This can be used at a later time if you forget your password. 8. Enter your e-mail address. You will receive e-mail notification when new information is available in [address] 9. Click Sign Up. You can now view and download portions of your medical record. 10. Click the Download Summary menu link to download a portable copy of your medical information. If you have questions, please visit the Frequently Asked Questions section of the [employer] website. Remember, [employer] is NOT to be used for urgent needs. For medical emergencies, dial 911. Now available from your iPhone and Android! Please provide this summary of care documentation to your next provider. Your primary care clinician is listed as JONATHAN HORN. If you have any questions after today's visit, please call [phone].

## 2018-01-18 NOTE — PROGRESS NOTES
Violetta Garcia 50 y.o. male     Mr. Bear Devries seen today for hypogonadism follow-up-here today for second injection of testosterone initiating TRT by IM injection  Patient reports no significant change in strength, stamina, libido or sense of well-being following initial injection 3 weeks ago  hypogonadism diagnosed and treated 1 year ago with application of testosterone gel to skin without appreciable therapeutic benefit   referred to urology for consideration of testosterone replacement therapy by injection of testosterone intramuscularly  Patient has no history of  tract disease, trauma, or surgery   Patient has easy fatigability, lethargy, erectile dysfunction and poor libido          Testosterone 144 in October 2017  PSA 0.4 in October 2017      Testosterone function panel 5 December 2017:  Testosterone 49  FSH 1.2  LH 3.4  Prolactin 10.4     PSA 0.5 on 4 December 2017  Hematocrit 44.7  \"          Review of Systems:   CNS: No seizure headaches dizziness visual changes  Respiratory: No wheezing no shortness of breath no chest pain  Cardiovascular: No palpitations no angina  Intestinal: GERD  Urinary: No urgency frequency dysuria or hematuria  Skeletal: Chronic low back pain  Endocrine: No bone or joint pain  Other:                                                                                       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  omeprazole (PRILOSEC) 40 mg capsule take 1 capsule by mouth once daily 90 Cap 2    triamcinolone (NASACORT AQ) 55 mcg nasal inhaler 2 Sprays daily.  cyclobenzaprine (FLEXERIL) 10 mg tablet Take 1 Tab by mouth nightly. 30 Tab 1    zolpidem (AMBIEN) 10 mg tablet Take 1 Tab by mouth nightly as needed for Sleep.  Max Daily Amount: 10 mg. 30 Tab 3    testosterone (FORTESTA) 10 mg/0.5 gram /actuation glpm 2 Pump(s) by TransDERmal route daily. Max Daily Amount: 2 Pump(s). 60 g 3    varenicline (CHANTIX STARTER MARGA) 0.5 mg (11)- 1 mg (42) DsPk Per Dose pack instructions 1 Dose Pack 0       Past Medical History:   Diagnosis Date    High cholesterol 9/13/2013    Sleep apnea       Past Surgical History:   Procedure Laterality Date    HX ORTHOPAEDIC      Broken Sturnum      Family History   Problem Relation Age of Onset    Hypertension Mother     Stroke Mother     Diabetes Mother     Heart Disease Mother     Heart Disease Father         Physical Examination: well nourished mature male in no apparent distress      Impression: Hypogonadism-TRT initiation        Plan: Testosterone 200 mg IM left buttock today    rtc 3 weeks-TRT-we will instruct spouse in technique of intramuscular injection of testosterone at that time        More than 1/2 of this 15 minute visit was spent in counselling and coordination of care, as described above. Kori Barahona MD  -electronically signed-    PLEASE NOTE:  This document has been produced using voice recognition software. Unrecognized errors in transcription may be present.

## 2018-02-07 ENCOUNTER — OFFICE VISIT (OUTPATIENT)
Dept: UROLOGY | Age: 53
End: 2018-02-07

## 2018-02-07 VITALS
HEART RATE: 98 BPM | OXYGEN SATURATION: 96 % | DIASTOLIC BLOOD PRESSURE: 75 MMHG | WEIGHT: 254 LBS | BODY MASS INDEX: 30.93 KG/M2 | HEIGHT: 76 IN | TEMPERATURE: 97.6 F | SYSTOLIC BLOOD PRESSURE: 122 MMHG

## 2018-02-07 DIAGNOSIS — E29.1 HYPOGONADISM MALE: Primary | ICD-10-CM

## 2018-02-07 RX ORDER — TESTOSTERONE CYPIONATE 200 MG/ML
200 INJECTION INTRAMUSCULAR ONCE
Qty: 1 ML | Refills: 0
Start: 2018-02-07 | End: 2018-02-07

## 2018-02-07 NOTE — MR AVS SNAPSHOT
[address] [facility] [place] [address] [address] 
[phone] Patient: Molly Jean-Baptiste MRN: [id number] :1965 Visit Information Date & Time Provider Department Dept. Phone Encounter #  
 2018  1:15 PM Karen Varela [place] [facility] [phone] Your Appointments 3/6/2018  7:45 AM  
LAB with [facility] ([facility]) Appt Note: 4 mo f/u lab 129 [facility] [address] [address]  
[phone]  
  
   
 [address], [place] [facility]  
  
    
 3/13/2018  8:15 AM  
Office Visit with Noah Metha MD  
[facility]) Appt Note: 4 mo f/u  
 [address], [place] [address] [address]  
[phone]  
  
   
 [address],  [address] Upcoming Health Maintenance Date Due Pneumococcal 19-64 Medium Risk (1 of 1 - PPSV23) 1986 DTaP/Tdap/Td series (2 - Td) 2015 Influenza Age 5 to Adult 2017 FOBT Q 1 YEAR AGE 50-75 2017 Allergies as of 2018  Review Complete On: 2018 By: Chandra Winn MD  
 No Known Allergies Current Immunizations  Never Reviewed Name Date Influenza Vaccine Whole 2010 TDAP Vaccine 2005 Not reviewed this visit Vitals BP Pulse Temp Height(growth percentile) Weight(growth percentile) SpO2  
 122/75 (BP 1 Location: Left arm, BP Patient Position: Sitting) 98 97.6 °F (36.4 °C) (Oral) 6' 4\" (1.93 m) 254 lb (115.2 kg) 96% BMI Smoking Status 30.92 kg/m2 Current Every Day Smoker BMI and BSA Data Body Mass Index Body Surface Area 30.92 kg/m 2 2.49 m 2 Preferred Pharmacy Pharmacy Name Phone [address], [address] [place] [address] [phone] Your Updated Medication List  
  
   
 This list is accurate as of: 2/7/18  1:25 PM.  Always use your most recent med list.  
  
  
  
  
 cyclobenzaprine 10 mg tablet Commonly known as:  FLEXERIL Take 1 Tab by mouth nightly. NASACORT AQ 55 mcg nasal inhaler Generic drug:  triamcinolone 2 Sprays daily. omeprazole 40 mg capsule Commonly known as:  PRILOSEC  
take 1 capsule by mouth once daily  
  
 testosterone 10 mg/0.5 gram /actuation Glpm  
Commonly known as:  FORTESTA  
2 Pump(s) by TransDERmal route daily. Max Daily Amount: 2 Pump(s). testosterone cypionate 200 mg/mL injection Commonly known as:  DEPOTESTOTERONE CYPIONATE  
200 mg by IntraMUSCular route every twenty-one (21) days. varenicline 0.5 mg (11)- 1 mg (42) Dspk Commonly known as:  CHANTIX STARTER MARGA Per Dose pack instructions  
  
 zolpidem 10 mg tablet Commonly known as:  AMBIEN Take 1 Tab by mouth nightly as needed for Sleep. Max Daily Amount: 10 mg. Patient Instructions Hypogonadism: Care Instructions Your Care Instructions Men who have hypogonadism do not make enough testosterone. This hormone allows men to make sperm and to have normal physical male traits. The condition also is known as testosterone deficiency. It can lead to loss of sex drive, weakness, impotence, infertility, and weakened bones. Many things can cause this condition. Causes include injured testicles, certain medicines, an infection, and aging. Having a long-term health problem such as kidney or liver disease or being obese can cause it. So can surgery or radiation treatment for another health problem. It also can be present at birth. It is most often treated with testosterone hormone. You can get the hormone as a shot or through a patch or gel on the skin. Follow-up care is a key part of your treatment and safety.  Be sure to make and go to all appointments, and call your doctor if you are having problems. It's also a good idea to know your test results and keep a list of the medicines you take. How can you care for yourself at home? · Take your medicines exactly as prescribed. Call your doctor if you think you are having a problem with your medicine. You will get more details on the specific medicines your doctor prescribes. · Follow your treatment plan. If you use testosterone hormones, follow your doctor's instructions. Hormones can help relieve many of the effects of this condition, such as impotence. But it may take weeks or months for your symptoms to improve. · Get plenty of exercise. And make sure to get plenty of calcium and vitamin D in your diet. Eat more dairy foods and green vegetables. They can help keep your bones from getting weak. · If you have a hard time dealing with this condition, talk to your doctor about joining a support group. Talking with others who have the same problems can help you cope. When should you call for help? Call your doctor now or seek immediate medical care if: 
? · You have headaches. ? · You have problems with your vision. ? Watch closely for changes in your health, and be sure to contact your doctor if: 
? · You have trouble getting or keeping an erection. ? · You have a loss of body hair. ? · You feel weak or tired a lot of the time. ? · Your breasts are getting larger. ? · You do not get better as expected. Where can you learn more? Go to http://deni-freddy.info/. Enter 99 098205 in the search box to learn more about \"Hypogonadism: Care Instructions. \" Current as of: May 12, 2017 Content Version: 11.4 © 3720-5147 [employer]. Care instructions adapted under license by [employer] (which disclaims liability or warranty for this information).  If you have questions about a medical condition or this instruction, always ask your healthcare professional. Fi[employer] Potts [employer] disclaims any warranty or liability for your use of this information. Introducing [facility] & HEALTH SERVICES! Francie Fraser introduces FanXT patient portal. Now you can access parts of your medical record, email your doctor's office, and request medication refills online. 1. In your internet browser, go to [email or web address] 2. Click on the First Time User? Click Here link in the Sign In box. You will see the New Member Sign Up page. 3. Enter your FanXT Access Code exactly as it appears below. You will not need to use this code after youve completed the sign-up process. If you do not sign up before the expiration date, you must request a new code. · FanXT Access Code: [email or web address] Expires: 4/17/2018  1:27 PM 
 
4. Enter the last four digits of your Social Security Number (xxxx) and Date of Birth (mm/dd/yyyy) as indicated and click Submit. You will be taken to the next sign-up page. 5. Create a FanXT ID. This will be your FanXT login ID and cannot be changed, so think of one that is secure and easy to remember. 6. Create a FanXT password. You can change your password at any time. 7. Enter your Password Reset Question and Answer. This can be used at a later time if you forget your password. 8. Enter your e-mail address. You will receive e-mail notification when new information is available in [address] 9. Click Sign Up. You can now view and download portions of your medical record. 10. Click the Download Summary menu link to download a portable copy of your medical information. If you have questions, please visit the Frequently Asked Questions section of the FanXT website. Remember, FanXT is NOT to be used for urgent needs. For medical emergencies, dial 911. Now available from your iPhone and Android! Please provide this summary of care documentation to your next provider. Your primary care clinician is listed as JONATHAN HORN. If you have any questions after today's visit, please call [phone].

## 2018-02-07 NOTE — PROGRESS NOTES
Mr. Jhonny Talamantes has a reminder for a \"due or due soon\" health maintenance. I have asked that he contact his primary care provider for follow-up on this health maintenance.

## 2018-02-07 NOTE — PATIENT INSTRUCTIONS
Hypogonadism: Care Instructions  Your Care Instructions    Men who have hypogonadism do not make enough testosterone. This hormone allows men to make sperm and to have normal physical male traits. The condition also is known as testosterone deficiency. It can lead to loss of sex drive, weakness, impotence, infertility, and weakened bones. Many things can cause this condition. Causes include injured testicles, certain medicines, an infection, and aging. Having a long-term health problem such as kidney or liver disease or being obese can cause it. So can surgery or radiation treatment for another health problem. It also can be present at birth. It is most often treated with testosterone hormone. You can get the hormone as a shot or through a patch or gel on the skin. Follow-up care is a key part of your treatment and safety. Be sure to make and go to all appointments, and call your doctor if you are having problems. It's also a good idea to know your test results and keep a list of the medicines you take. How can you care for yourself at home? · Take your medicines exactly as prescribed. Call your doctor if you think you are having a problem with your medicine. You will get more details on the specific medicines your doctor prescribes. · Follow your treatment plan. If you use testosterone hormones, follow your doctor's instructions. Hormones can help relieve many of the effects of this condition, such as impotence. But it may take weeks or months for your symptoms to improve. · Get plenty of exercise. And make sure to get plenty of calcium and vitamin D in your diet. Eat more dairy foods and green vegetables. They can help keep your bones from getting weak. · If you have a hard time dealing with this condition, talk to your doctor about joining a support group. Talking with others who have the same problems can help you cope. When should you call for help?   Call your doctor now or seek immediate medical care if:  ? · You have headaches. ? · You have problems with your vision. ? Watch closely for changes in your health, and be sure to contact your doctor if:  ? · You have trouble getting or keeping an erection. ? · You have a loss of body hair. ? · You feel weak or tired a lot of the time. ? · Your breasts are getting larger. ? · You do not get better as expected. Where can you learn more? Go to http://deni-freddy.info/. Enter 99 [id number] in the search box to learn more about \"Hypogonadism: Care Instructions. \"  Current as of: May 12, 2017  Content Version: 11.4  © 3862-7847 Healthwise, [employer]. Care instructions adapted under license by [employer] (which disclaims liability or warranty for this information). If you have questions about a medical condition or this instruction, always ask your healthcare professional. [address] any warranty or liability for your use of this information.

## 2018-02-08 NOTE — PROGRESS NOTES
Molly Jean-Baptiste 50 y.o. male     Mr. Yamilka Rodriguez seen today for hypogonadism follow-up-here today for instruction of spouse in technique of intramuscular testosterone injection  Patient reports no significant change in strength, stamina, libido or sense of well-being following initial injection 3 weeks ago  hypogonadism diagnosed and treated 1 year ago with application of testosterone gel to skin without appreciable therapeutic benefit   referred to urology for consideration of testosterone replacement therapy by injection of testosterone intramuscularly  Patient has no history of  tract disease, trauma, or surgery   Patient has easy fatigability, lethargy, erectile dysfunction and poor libido          Testosterone 144 in October 2017  PSA 0.4 in October 2017       Testosterone function panel 5 December 2017:  Testosterone 49  FSH 1.2  LH 3.4  Prolactin 10.4      PSA 0.5 on 4 December 2017  Hematocrit 44.7  \"          Review of Systems:   CNS: No seizure headaches dizziness visual changes  Respiratory: No wheezing no shortness of breath no chest pain  Cardiovascular: No palpitations no angina  Intestinal: GERD  Urinary: No urgency frequency dysuria or hematuria  Skeletal: Chronic low back pain  Endocrine: No bone or joint pain  Other:                                                                                       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  cyclobenzaprine (FLEXERIL) 10 mg tablet Take 1 Tab by mouth nightly. 30 Tab 1    zolpidem (AMBIEN) 10 mg tablet Take 1 Tab by mouth nightly as needed for Sleep.  Max Daily Amount: 10 mg. 30 Tab 3    testosterone (FORTESTA) 10 mg/0.5 gram /actuation glpm 2 Pump(s) by TransDERmal route daily. Max Daily Amount: 2 Pump(s). 60 g 3    varenicline (CHANTIX STARTER MARGA) 0.5 mg (11)- 1 mg (42) DsPk Per Dose pack instructions 1 Dose Pack 0    triamcinolone (NASACORT AQ) 55 mcg nasal inhaler 2 Sprays daily. Past Medical History:   Diagnosis Date    High cholesterol 9/13/2013    Sleep apnea       Past Surgical History:   Procedure Laterality Date    HX ORTHOPAEDIC      Broken Sturnum      Family History   Problem Relation Age of Onset    Hypertension Mother     Stroke Mother     Diabetes Mother     Heart Disease Mother     Heart Disease Father         Physical Examination: Nourished mature male in no apparent distress    Procedure Note:                                                         Intramuscular Injection of Testosterone  Patient's spouse was instructed today in the technique of syringe preparation,  injection of testosterone 200 mg IM into the upper outer left buttock-also instructed in technique of proper disposal of syringes and needles-patient's spouse demonstrated proficiency in the technique of syringe preparation injection and disposal      Impression: Hypogonadism responding favorably to testosterone replacement therapy        Plan:   Testosterone   2 00 mg IM q. 3 weeks    Rx testosterone in oil 200 mg/cc dispensed 10 cc signify 1 cc IM every 3 weeks 3 cc syringes #1221-gauge needles #12 refill ×2 prescription faxed to compounding pharmacy in [facility] today    rtc CBC PSA6 mo-          More than 1/2 of this 25 minute visit was spent in counselling and coordination of care, as described above. Amisha Cantor MD  -electronically signed-    PLEASE NOTE:  This document has been produced using voice recognition software. Unrecognized errors in transcription may be present.

## 2018-02-13 ENCOUNTER — HOSPITAL ENCOUNTER (OUTPATIENT)
Dept: PHYSICAL THERAPY | Age: 53
Discharge: HOME | End: 2018-02-13
Payer: COMMERCIAL

## 2018-02-13 PROCEDURE — 97161 PT EVAL LOW COMPLEX 20 MIN: CPT

## 2018-02-13 PROCEDURE — 97110 THERAPEUTIC EXERCISES: CPT

## 2018-02-13 NOTE — PROGRESS NOTES
PT DAILY TREATMENT NOTE/SHOULDER EVAL 3-16    Patient Name: Omer aDugherty  Date:2018  : 1965  [x]  Patient  Verified  Payor: Renetta Rollins / Plan: [employer] [facility] [employer] / Product Type: PPO /    In time:203  Out time:245  Total Treatment Time (min): 42  Total Timed Codes (min): 8  1:1 Treatment Time ( only): 8   Visit #: 1 of 10    Treatment Area: Pain in right shoulder [M25.511]    SUBJECTIVE  Pain Level (0-10 scale):  4  [x]constant []intermittent [x]improving []worsening []no change since onset  WORSE certain movements ie HBB, overhead reaching, reaching out in front, Better not moving it, relaxing it  Any medication changes, allergies to medications, adverse drug reactions, diagnosis change, or new procedure performed?: [x] No    [] Yes (see summary sheet for update)  Subjective functional status/changes:     PLOF: I all areas of ADLS and activities with some R shoulder pain, baseball with son, household chores,   Limitations to PLOF: pain in the R shoulder and neck as well  Mechanism of Injury: 4 months ago reports weight lifting injury  Current symptoms/Complaints: 51 YO male diagnosed as above and with S/S consistent with above diagnosis presents to skilled outpatient PT. CCO R shoulder pain and also neck pain. Onset R shoulder 4 months ago while weight lifting. He notes pain today is improved but still 4/10. He was UA to use the R arm when it first happened, difficulty sleeping and dressing-- all of these areas have since improved. CCO at this time is reaching in close to his body to do things is painful and tight, HBB is weak. He is R hand dominant. Previous Treatment/Compliance: MRI, self treating it.    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[employer], self employed  Living Situation: lives in a 1 story house, no alone  Pt Goals: avoid surgery  Barriers: []pain []financial []time []transportation []other  Motivation: good  Substance use: []Alcohol [x]Tobacco []other: FABQ Score: []low []elevate  Cognition: A & O x 4    Other:    OBJECTIVE/EXAMINATION  Domestic Life: household chores, work demands, playing baseball  Activity/Recreational Limitations: pain   Mobility: I   Self Care: I         Modality rationale:     Min Type Additional Details    [] Estim:  []Unatt       []IFC  []Premod                        []Other:  []w/ice   []w/heat  Position:  Location:    [] Estim: []Att    []TENS instruct  []NMES                    []Other:  []w/US   []w/ice   []w/heat  Position:  Location:    []  Traction: [] Cervical       []Lumbar                       [] Prone          []Supine                       []Intermittent   []Continuous Lbs:  [] before manual  [] after manual    []  Ultrasound: []Continuous   [] Pulsed                           []1MHz   []3MHz Location:  W/cm2:    []  Iontophoresis with dexamethasone         Location: [] Take home patch   [] In clinic    []  Ice     []  heat  []  Ice massage  []  Laser   []  Anodyne Position:  Location:    []  Laser with stim  []  Other: Position:  Location:    []  Vasopneumatic Device Pressure:       [] lo [] med [] hi   Temperature: [] lo [] med [] hi   [] Skin assessment post-treatment:  []intact []redness- no adverse reaction    []redness - adverse reaction:     34 min [x]Eval                  []Re-Eval       8 min Therapeutic Exercise:  [x] See flow sheet :   Rationale: increase ROM, increase strength and improve coordination to improve the patients ability to aid with increase tolerance to ADLs and activities     min Therapeutic Activity:  []  See flow sheet :   Rationale:   to improve the patients ability to       min Neuromuscular Re-education:  []  See flow sheet :   Rationale:   to improve the patients ability to      min Manual Therapy:     Rationale: to      min Gait Training:  ___ feet with ___ device on level surfaces with ___ level of assist   Rationale:           With   [] TE   [] TA   [] neuro   [] other: Patient Education: [x] Review HEP    [] Progressed/Changed HEP based on:   [] positioning   [] body mechanics   [] transfers   [] heat/ice application    [] other:      Other Objective/Functional Measures: Csp AROM   FF/BB  38/38   SB R/L 45/30     ROT  R/L 35/35  Physical Therapy Evaluation - Shoulder    Posture: [] Poor    [] Fair    [x] Good    Describe:    ROM:  [] Unable to assess at this time    R    UE HBH  able,  HBB to LS region  L    UE HBH  able,  HBB to R scapula                                           AROM                                                              PROM   Left Right  Left Right   Flexion 150 160 Flexion     Extension   Extension     Scaption/ 160 Scaptin/ABD     ER @ 0 Degrees   ER @ 0 Degrees     ER @ 90 Degrees 70 76 ER @ 90 Degrees     IR @ 0 Degrees abdomen abdomen IR @ 90 Degrees       End Feel / Painful Arc:    Strength:   [] Unable to assess at this time                                                                            L (1-5) R (1-5) Pain   Flexors 5 5 [] Yes   [] No   Abductors 5 5 [] Yes   [] No   External Rotators 5 4 [x] Yes mild  [] No   Internal Rotators 5 4 [x] Yes mild  [] No   Supraspinatus   [] Yes   [] No   Serratus Anterior   [] Yes   [] No   Lower Trapezius   [] Yes   [] No   Elbow Flexion   [] Yes   [] No   Elbow Extension   [] Yes   [] No       Scapulohumoral Control / Rhythm:  Able to eccentrically lower with good control?  Left: [x] Yes   [] No     Right: [x] Yes   [] No    Accessory Motions:    Palpation  [] Min  [x] Mod  [] Severe    Location: TTP Sub acromial bursa , teres trigger point  [] Min  [] Mod  [] Severe    Location:  [] Min  [] Mod  [] Severe    Location:    Optional Tests:    Sensation Left Right Reflexes Left Right   Biceps (C5)   Biceps (C5)     Schneider Radial(C6-7)   Brachioradialis (C6)     Schneider Ulnar(C8-T1)   Triceps (C7)       Adson's Test  [] Pos   [] Neg Yergason's Test [] Pos   [] Neg  Heide's Test  [] Pos   [] Neg [place]'s Sign [] Pos   [] Neg  Neer's Test  [] Pos   [] Neg Clunk Test  [] Pos   [] Neg  Hawkin's Test  [] Pos   [] Neg AC Joint  [] Pos   [] Neg  Speed's Test  [] Pos   [] Neg SC Joint  [] Pos   [] Neg  Empty Can  [] Pos   [] Neg Pectoral Tightness [] Pos   [] Neg  Anterior Apprehension [] Pos   [] Neg   Posterior Apprehension [] Pos   [] Neg      Other Tests / Comments:        Pain Level (0-10 scale) post treatment: 4    ASSESSMENT/Changes in Function:  Patient demonstrates the potential to make gains with improved ROM, strength, endurance/activity tolerance, functional FOTO survey score  and all within a reasonable time frame so as to increase their functional independence with ADLs and activities for carryover to  Improved quality of life, tolerance to house hold chores, playing baseball with his son. Patient requires skilled Physical Therapy so as to monitor their response to and modify their treatment plan accordingly. Patient appears to be an appropriate candidate for skilled outpatient Physical Therapy. Patient will continue to benefit from skilled PT services to modify and progress therapeutic interventions, address functional mobility deficits, address ROM deficits, address strength deficits, analyze and address soft tissue restrictions, analyze and cue movement patterns, analyze and modify body mechanics/ergonomics, assess and modify postural abnormalities and instruct in home and community integration to attain remaining goals.      [x]  See Plan of Care  []  See progress note/recertification  []  See Discharge Summary         Progress towards goals / Updated goals:       PLAN  [x]  Upgrade activities as tolerated     [x]  Continue plan of care  []  Update interventions per flow sheet       []  Discharge due to:_  []  Other:_      Javy Luevano, PT 2/13/2018  1:50 PM

## 2018-02-13 NOTE — PROGRESS NOTES
[facility] [address], [address] [place] [address], [address],[place] [address]  [phone] fax      Plan of Care/ Statement of Necessity for Physical Therapy Services    Patient name: Geovanni Moran Start of Care: 2018   Referral source: Breezy Reyes MD : 1965    Medical Diagnosis: Pain in right shoulder [M25.511]   Onset Date:4 months    Treatment Diagnosis: decrease tolerance to ADLs and activities due to LOM , decrease strength R shoulder, STC R UT and Teres. Prior Hospitalization: see medical history Provider#: [id number]   Medications: Verified on Patient summary List    Comorbidities: none   Prior Level of Function: I all areas of ADLS and activities with some R shoulder pain, baseball with son, household chores,      The Plan of Care and following information is based on the information from the initial evaluation. Assessment/ key information: 49 YO male diagnosed as above and with S/S consistent with above diagnosis presents to skilled outpatient PT. CCO R shoulder pain and also neck pain. Onset R shoulder 4 months ago while weight lifting. He notes pain today is improved but still 4/10. He was UA to use the R arm when it first happened, difficulty sleeping and dressing-- all of these areas have since improved. CCO at this time is reaching in close to his body to do things is painful and tight, HBB is weak. He is R hand dominant. Previous Treatment/Compliance: MRI, self treating it. Pain 4, FOTO neck 65,  shoulder 63. Posture good, R UE HBH  able,  HBB to LS region, AROM F 160, abd 160, ER 76 , IR Abdomen. MMT shoulder F 5, abd 5, ER 4 , IR 4. Able to eccentrically lower R UE with good control. Moderate  TTP Sub acromial bursa , teres trigger point.     Patient demonstrates the potential to make gains with improved ROM, strength, endurance/activity tolerance, functional FOTO survey score  and all within a reasonable time frame so as to increase their functional independence with ADLs and activities for carryover to  Improved quality of life, tolerance to house hold chores, playing baseball with his son. Patient requires skilled Physical Therapy so as to monitor their response to and modify their treatment plan accordingly. Patient appears to be an appropriate candidate for skilled outpatient Physical Therapy.     Evaluation Complexity History MEDIUM  Complexity : 1-2 comorbidities / personal factors will impact the outcome/ POC ; Examination MEDIUM Complexity : 3 Standardized tests and measures addressing body structure, function, activity limitation and / or participation in recreation  ;Presentation LOW Complexity : Stable, uncomplicated  ;Clinical Decision Making MEDIUM Complexity : FOTO score of 26-74  Overall Complexity Rating: LOW   Problem List: pain affecting function, decrease ROM, decrease strength, decrease ADL/ functional abilitiies, decrease activity tolerance and decrease flexibility/ joint mobility   Treatment Plan may include any combination of the following: Therapeutic exercise, Therapeutic activities, Neuromuscular re-education, Physical agent/modality, Manual therapy, Patient education and Self Care training  Patient / Family readiness to learn indicated by: asking questions, trying to perform skills and interest  Persons(s) to be included in education: patient (P)  Barriers to Learning/Limitations: None  Patient Goal (s): avoid surgery  Patient Self Reported Health Status: good  Rehabilitation Potential: good    Short Term Goals: To be accomplished in 5 treatments:   1 patient will have established and be independent with HEP to aid with progression of skilled PT program   EVAL issued   CURRENT   2 patient will have FOTO Shoulder improved to 66 to show increase tolerance to ADLS   EVAL 63   CURRENT    Long Term Goals:  To be accomplished in 10 treatments:   1 patient will have FOTO Shoulder improved to 72 to show increase tolerance to ADLS   EVAL 63   CURRENT   2 patient will have pain 1-2/10 to aid with increase tolerance to ADLs and activities   EVAL 4   CURRENT   3 patient will report overall 50% improvement to aid with increase tolerance to ADLs and activities with his son   EVAL   CURRENT   4 patient will have MMT R shoulder ER IR 5 to aid with increase tolerance to ADLS and activities and baseball with his son   EVAL 4   CURRENT      Frequency / Duration: Patient to be seen 2-3 times per week for 10 treatments. Patient/ Caregiver education and instruction: Diagnosis, prognosis, self care, activity modification and exercises   [x]  Plan of care has been reviewed with YUDY Liriano, PT 2/13/2018 1:50 PM  ________________________________________________________________________    I certify that the above Therapy Services are being furnished while the patient is under my care. I agree with the treatment plan and certify that this therapy is necessary.     [de-identified] Signature:____________________  Date:____________Time: _________    Please sign and return to [facility] [address] [place] [address], [address] [place] [address],[facility] [address]  [phone] fax

## 2018-02-22 ENCOUNTER — APPOINTMENT (OUTPATIENT)
Dept: PHYSICAL THERAPY | Age: 53
End: 2018-02-22
Payer: COMMERCIAL

## 2018-02-28 NOTE — PROGRESS NOTES
[facility] [address], [address] [place] [address], [address] [place], [place] [address] [address] [address]  [phone] fax    Discharge Summary    Patient name: Newt Dakin     Start of Care:10/26/18  Referral source: Mushtaq Pacheco MD    : 1965  Medical/Treatment Diagnosis: Cervicalgia [M54.2]  Onset Datelongstanding, 5 years  Prior Hospitalization: see medical history   Provider#: [id number]  Comorbidities: none   Prior Level of Function: I all areas of ADLS and activities, no AD use, full time  , needs to tolerate household chores and community activities    Medications: Verified on Patient Summary List    Visits from Start of Care: 4    Missed Visits:0  Reporting Period : 10/26/17 to 17      Summary of Care:Patient seen for 4 skilled sessions. He was given exercises for his HEP. He had residual pain 5/10 and FOTO was not reassessed. He should follow up with his MD as needed, thank you.    Goal:patient will have established and be I with HEP to aid with progression of skilled PT program               Status at last note/certification:eval  Status at discharge: met    Goal: patient will have pain 3/10 to aid with increase tolerance to ADLS and activities  Status at last note/certification:eval  Status at discharge: not met    Goal:patient will have FOTO improved to  59 to show increase tolerance to ADLS and activities including his work demands                             Status at last note/certificationeval:  Status at discharge: not met    Goal:patient will have AROM Csp ROT B 40 and FF 35 and BB 30 to aid with increase tolerance to activities and ADLS          Status at last note/certification:eval  Status at discharge: not met      ASSESSMENT/RECOMMENDATIONS:  []Discontinue therapy progressing towards or have reached established goals  []Discontinue therapy due to lack of appreciable progress towards goals  [x]Discontinue therapy due to lack of attendance or compliance  [x]Other:no further contact  Thank you for this referral.     Wendy Choi, PT 2/28/2018 11:33 AM

## 2018-03-07 ENCOUNTER — HOSPITAL ENCOUNTER (OUTPATIENT)
Dept: LAB | Age: 53
Discharge: HOME | End: 2018-03-07
Payer: COMMERCIAL

## 2018-03-07 LAB
ALBUMIN SERPL-MCNC: 3.9 G/DL (ref 3.4–5)
ALBUMIN/GLOB SERPL: 1.3 {RATIO} (ref 0.8–1.7)
ALP SERPL-CCNC: 53 U/L (ref 45–117)
ALT SERPL-CCNC: 28 U/L (ref 16–61)
ANION GAP SERPL CALC-SCNC: 5 MMOL/L (ref 3–18)
AST SERPL-CCNC: 19 U/L (ref 15–37)
BASOPHILS # BLD: 0.1 K/UL (ref 0–0.06)
BASOPHILS NFR BLD: 1 % (ref 0–2)
BILIRUB SERPL-MCNC: 0.5 MG/DL (ref 0.2–1)
BUN SERPL-MCNC: 14 MG/DL (ref 7–18)
BUN/CREAT SERPL: 16 (ref 12–20)
CALCIUM SERPL-MCNC: 9.3 MG/DL (ref 8.5–10.1)
CHLORIDE SERPL-SCNC: 105 MMOL/L (ref 100–108)
CHOLEST SERPL-MCNC: 197 MG/DL
CO2 SERPL-SCNC: 30 MMOL/L (ref 21–32)
CREAT SERPL-MCNC: 0.9 MG/DL (ref 0.6–1.3)
DIFFERENTIAL METHOD BLD: ABNORMAL
EOSINOPHIL # BLD: 0.2 K/UL (ref 0–0.4)
EOSINOPHIL NFR BLD: 3 % (ref 0–5)
ERYTHROCYTE [DISTWIDTH] IN BLOOD BY AUTOMATED COUNT: 13.3 % (ref 11.6–14.5)
GLOBULIN SER CALC-MCNC: 3.1 G/DL (ref 2–4)
GLUCOSE SERPL-MCNC: 92 MG/DL (ref 74–99)
HCT VFR BLD AUTO: 46.1 % (ref 36–48)
HDLC SERPL-MCNC: 61 MG/DL (ref 40–60)
HDLC SERPL: 3.2 {RATIO} (ref 0–5)
HGB BLD-MCNC: 15.2 G/DL (ref 13–16)
LDLC SERPL CALC-MCNC: 93 MG/DL (ref 0–100)
LIPID PROFILE,FLP: ABNORMAL
LYMPHOCYTES # BLD: 1.7 K/UL (ref 0.9–3.6)
LYMPHOCYTES NFR BLD: 23 % (ref 21–52)
MCH RBC QN AUTO: 31.5 PG (ref 24–34)
MCHC RBC AUTO-ENTMCNC: 33 G/DL (ref 31–37)
MCV RBC AUTO: 95.4 FL (ref 74–97)
MONOCYTES # BLD: 0.6 K/UL (ref 0.05–1.2)
MONOCYTES NFR BLD: 8 % (ref 3–10)
NEUTS SEG # BLD: 4.8 K/UL (ref 1.8–8)
NEUTS SEG NFR BLD: 65 % (ref 40–73)
PLATELET # BLD AUTO: 257 K/UL (ref 135–420)
PMV BLD AUTO: 11.4 FL (ref 9.2–11.8)
POTASSIUM SERPL-SCNC: 4.5 MMOL/L (ref 3.5–5.5)
PROT SERPL-MCNC: 7 G/DL (ref 6.4–8.2)
PSA SERPL-MCNC: 0.5 NG/ML (ref 0–4)
RBC # BLD AUTO: 4.83 M/UL (ref 4.7–5.5)
SODIUM SERPL-SCNC: 140 MMOL/L (ref 136–145)
TRIGL SERPL-MCNC: 215 MG/DL (ref ?–150)
VLDLC SERPL CALC-MCNC: 43 MG/DL
WBC # BLD AUTO: 7.3 K/UL (ref 4.6–13.2)

## 2018-03-07 PROCEDURE — 85025 COMPLETE CBC W/AUTO DIFF WBC: CPT | Performed by: INTERNAL MEDICINE

## 2018-03-07 PROCEDURE — 84153 ASSAY OF PSA TOTAL: CPT | Performed by: INTERNAL MEDICINE

## 2018-03-07 PROCEDURE — 80061 LIPID PANEL: CPT | Performed by: INTERNAL MEDICINE

## 2018-03-07 PROCEDURE — 80053 COMPREHEN METABOLIC PANEL: CPT | Performed by: INTERNAL MEDICINE

## 2018-03-07 PROCEDURE — 36415 COLL VENOUS BLD VENIPUNCTURE: CPT | Performed by: INTERNAL MEDICINE

## 2018-03-07 PROCEDURE — 84403 ASSAY OF TOTAL TESTOSTERONE: CPT | Performed by: INTERNAL MEDICINE

## 2018-03-08 LAB — TESTOST SERPL-MCNC: 308 NG/DL (ref 264–916)

## 2018-03-13 ENCOUNTER — OFFICE VISIT (OUTPATIENT)
Dept: FAMILY MEDICINE CLINIC | Age: 53
End: 2018-03-13

## 2018-03-13 VITALS
DIASTOLIC BLOOD PRESSURE: 88 MMHG | HEIGHT: 76 IN | OXYGEN SATURATION: 96 % | WEIGHT: 245.2 LBS | HEART RATE: 99 BPM | RESPIRATION RATE: 20 BRPM | BODY MASS INDEX: 29.86 KG/M2 | SYSTOLIC BLOOD PRESSURE: 117 MMHG | TEMPERATURE: 98.4 F

## 2018-03-13 DIAGNOSIS — Z00.00 ROUTINE MEDICAL EXAM: Primary | ICD-10-CM

## 2018-03-13 DIAGNOSIS — F51.01 PRIMARY INSOMNIA: ICD-10-CM

## 2018-03-13 DIAGNOSIS — Z12.5 SCREENING PSA (PROSTATE SPECIFIC ANTIGEN): ICD-10-CM

## 2018-03-13 DIAGNOSIS — E29.1 HYPOGONADISM MALE: ICD-10-CM

## 2018-03-13 DIAGNOSIS — Z12.11 COLON CANCER SCREENING: ICD-10-CM

## 2018-03-13 DIAGNOSIS — J30.1 ACUTE SEASONAL ALLERGIC RHINITIS DUE TO POLLEN: ICD-10-CM

## 2018-03-13 RX ORDER — ZOLPIDEM TARTRATE 10 MG/1
10 TABLET ORAL
Qty: 30 TAB | Refills: 3 | Status: SHIPPED | OUTPATIENT
Start: 2018-03-13 | End: 2019-01-14 | Stop reason: SDUPTHER

## 2018-03-13 RX ORDER — TRIAMCINOLONE ACETONIDE 55 UG/1
2 SPRAY, METERED NASAL
Qty: 3 BOTTLE | Refills: 2 | Status: SHIPPED | OUTPATIENT
Start: 2018-03-13 | End: 2019-03-14 | Stop reason: SDUPTHER

## 2018-03-13 NOTE — PATIENT INSTRUCTIONS
Heart-Healthy Diet: Care Instructions  Your Care Instructions    A heart-healthy diet has lots of vegetables, fruits, nuts, beans, and whole grains, and is low in salt. It limits foods that are high in saturated fat, such as meats, cheeses, and fried foods. It may be hard to change your diet, but even small changes can lower your risk of heart attack and heart disease. Follow-up care is a key part of your treatment and safety. Be sure to make and go to all appointments, and call your doctor if you are having problems. It's also a good idea to know your test results and keep a list of the medicines you take. How can you care for yourself at home? Watch your portions  · Learn what a serving is. A \"serving\" and a \"portion\" are not always the same thing. Make sure that you are not eating larger portions than are recommended. For example, a serving of pasta is ½ cup. A serving size of meat is 2 to 3 ounces. A 3-ounce serving is about the size of a deck of cards. Measure serving sizes until you are good at [place]" them. Keep in mind that restaurants often serve portions that are 2 or 3 times the size of one serving. · To keep your energy level up and keep you from feeling hungry, eat often but in smaller portions. · Eat only the number of calories you need to stay at a healthy weight. If you need to lose weight, eat fewer calories than your body burns (through exercise and other physical activity). Eat more fruits and vegetables  · Eat a variety of fruit and vegetables every day. Dark green, deep orange, red, or yellow fruits and vegetables are especially good for you. Examples include spinach, carrots, peaches, and berries. · Keep carrots, celery, and other veggies handy for snacks. Buy fruit that is in season and store it where you can see it so that you will be tempted to eat it. · Cook dishes that have a lot of veggies in them, such as stir-fries and soups.   Limit saturated and trans fat  · Read food labels, and try to avoid saturated and trans fats. They increase your risk of heart disease. Trans fat is found in many processed foods such as cookies and crackers. · Use olive or canola oil when you cook. Try cholesterol-lowering spreads, such as Benecol or Take Control. · Bake, broil, grill, or steam foods instead of frying them. · Choose lean meats instead of high-fat meats such as hot dogs and sausages. Cut off all visible fat when you prepare meat. · Eat fish, skinless poultry, and meat alternatives such as soy products instead of high-fat meats. Soy products, such as tofu, may be especially good for your heart. · Choose low-fat or fat-free milk and dairy products. Eat fish  · Eat at least two servings of fish a week. Certain fish, such as salmon and tuna, contain omega-3 fatty acids, which may help reduce your risk of heart attack. Eat foods high in fiber  · Eat a variety of grain products every day. Include whole-grain foods that have lots of fiber and nutrients. Examples of whole-grain foods include oats, whole wheat bread, and brown rice. · Buy whole-grain breads and cereals, instead of white bread or pastries. Limit salt and sodium  · Limit how much salt and sodium you eat to help lower your blood pressure. · Taste food before you salt it. Add only a little salt when you think you need it. With time, your taste buds will adjust to less salt. · Eat fewer snack items, fast foods, and other high-salt, processed foods. Check food labels for the amount of sodium in packaged foods. · Choose low-sodium versions of canned goods (such as soups, vegetables, and beans). Limit sugar  · Limit drinks and foods with added sugar. These include candy, desserts, and soda pop. Limit alcohol  · Limit alcohol to no more than 2 drinks a day for men and 1 drink a day for women. Too much alcohol can cause health problems. When should you call for help?   Watch closely for changes in your health, and be sure to contact your doctor if:  ? · You would like help planning heart-healthy meals. Where can you learn more? Go to http://deni-freddy.info/. Enter V137 in the search box to learn more about \"Heart-Healthy Diet: Care Instructions. \"  Current as of: September 21, 2016  Content Version: 11.4  © 1904-4797 [employer]. Care instructions adapted under license by [employer]spatial (which disclaims liability or warranty for this information). If you have questions about a medical condition or this instruction, always ask your healthcare professional. [address] any warranty or liability for your use of this information. Body Mass Index: Care Instructions  Your Care Instructions    Body mass index (BMI) can help you see if your weight is raising your risk for health problems. It uses a formula to compare how much you weigh with how tall you are. · A BMI lower than 18.5 is considered underweight. · A BMI between 18.5 and 24.9 is considered healthy. · A BMI between 25 and 29.9 is considered overweight. A BMI of 30 or higher is considered obese. If your BMI is in the normal range, it means that you have a lower risk for weight-related health problems. If your BMI is in the overweight or obese range, you may be at increased risk for weight-related health problems, such as high blood pressure, heart disease, stroke, arthritis or joint pain, and diabetes. If your BMI is in the underweight range, you may be at increased risk for health problems such as fatigue, lower protection (immunity) against illness, muscle loss, bone loss, hair loss, and hormone problems. BMI is just one measure of your risk for weight-related health problems. You may be at higher risk for health problems if you are not active, you eat an unhealthy diet, or you drink too much alcohol or use tobacco products. Follow-up care is a key part of your treatment and safety.  Be sure to make and go to all appointments, and call your doctor if you are having problems. It's also a good idea to know your test results and keep a list of the medicines you take. How can you care for yourself at home? · Practice healthy eating habits. This includes eating plenty of fruits, vegetables, whole grains, lean protein, and low-fat dairy. · If your doctor recommends it, get more exercise. Walking is a good choice. Bit by bit, increase the amount you walk every day. Try for at least 30 minutes on most days of the week. · Do not smoke. Smoking can increase your risk for health problems. If you need help quitting, talk to your doctor about stop-smoking programs and medicines. These can increase your chances of quitting for good. · Limit alcohol to 2 drinks a day for men and 1 drink a day for women. Too much alcohol can cause health problems. If you have a BMI higher than 25  · Your doctor may do other tests to check your risk for weight-related health problems. This may include measuring the distance around your waist. A waist measurement of more than 40 inches in men or 35 inches in women can increase the risk of weight-related health problems. · Talk with your doctor about steps you can take to stay healthy or improve your health. You may need to make lifestyle changes to lose weight and stay healthy, such as changing your diet and getting regular exercise. If you have a BMI lower than 18.5  · Your doctor may do other tests to check your risk for health problems. · Talk with your doctor about steps you can take to stay healthy or improve your health. You may need to make lifestyle changes to gain or maintain weight and stay healthy, such as getting more healthy foods in your diet and doing exercises to build muscle. Where can you learn more? Go to http://deni-freddy.info/. Enter S176 in the search box to learn more about \"Body Mass Index: Care Instructions. \"  Current as of: October 13, 2016  Content Version: 11.4  © 5445-0276 [employer]. Care instructions adapted under license by [employer] (which disclaims liability or warranty for this information). If you have questions about a medical condition or this instruction, always ask your healthcare professional. [address] any warranty or liability for your use of this information.

## 2018-03-13 NOTE — MR AVS SNAPSHOT
[address] [facility] 
 
 
 [address] [address] 
[phone] Patient: Laly Mistry MRN: [id number] :1965 Visit Information Date & Time Provider Department Dept. Phone Encounter #  
 3/13/2018  8:15 AM Zachary Price[address] [phone] [id number] Follow-up Instructions Return in about 1 year (around 3/13/2019) for physical, labs 1 week before. Your Appointments 2018  1:00 PM  
Office Visit with Priscila Barraza MD  
[facility]) Appt Note: cbc/PSA f/u self TRT  
 [address] [place] [address] [address]  
[phone] [address] [place] [address] Upcoming Health Maintenance Date Due COLONOSCOPY 1985 Pneumococcal 19-64 Medium Risk (1 of 1 - PPSV23) 1986 DTaP/Tdap/Td series (2 - Td) 2015 Influenza Age 5 to Adult 2017 Allergies as of 3/13/2018  Review Complete On: 3/13/2018 By: Rod Currie LPN No Known Allergies Current Immunizations  Never Reviewed Name Date Influenza Vaccine Whole 2010 TDAP Vaccine 2005 Not reviewed this visit You Were Diagnosed With   
  
 Codes Comments Routine medical exam    -  Primary ICD-10-CM: Z00.00 ICD-9-CM: V70.0 Primary insomnia     ICD-10-CM: F51.01 
ICD-9-CM: 307.42 Colon cancer screening     ICD-10-CM: Z12.11 ICD-9-CM: V76.51 Vitals BP Pulse Temp Resp Height(growth percentile) Weight(growth percentile) 117/88 (BP 1 Location: Left arm, BP Patient Position: Sitting) 99 98.4 °F (36.9 °C) (Oral) 20 6' 4\" (1.93 m) 245 lb 3.2 oz (111.2 kg) SpO2 BMI Smoking Status 96% 29.85 kg/m2 Current Every Day Smoker Vitals History BMI and BSA Data Body Mass Index Body Surface Area  
 29.85 kg/m 2 2.44 m 2 Preferred Pharmacy Pharmacy Name Phone [facility], [address] [place] [address] [phone] Your Updated Medication List  
  
   
This list is accurate as of 3/13/18  8:50 AM.  Always use your most recent med list.  
  
  
  
  
 cyclobenzaprine 10 mg tablet Commonly known as:  FLEXERIL Take 1 Tab by mouth nightly. omeprazole 40 mg capsule Commonly known as:  PRILOSEC  
take 1 capsule by mouth once daily  
  
 testosterone cypionate 200 mg/mL injection Commonly known as:  DEPOTESTOTERONE CYPIONATE  
200 mg by IntraMUSCular route every twenty-one (21) days. triamcinolone 55 mcg nasal inhaler Commonly known as:  NASACORT AQ  
2 Sprays by Both Nostrils route daily as needed. varenicline 0.5 mg (11)- 1 mg (42) Dspk Commonly known as:  CHANTIX STARTER MARGA Per Dose pack instructions  
  
 zolpidem 10 mg tablet Commonly known as:  AMBIEN Take 1 Tab by mouth nightly as needed for Sleep. Max Daily Amount: 10 mg.  
  
  
  
  
Prescriptions Printed Refills  
 zolpidem (AMBIEN) 10 mg tablet 3 Sig: Take 1 Tab by mouth nightly as needed for Sleep. Max Daily Amount: 10 mg.  
 Class: Print Route: Oral  
  
Prescriptions Sent to Pharmacy Refills  
 triamcinolone (NASACORT AQ) 55 mcg nasal inhaler 2 Si Sprays by Both Nostrils route daily as needed. Class: Normal  
 Pharmacy: [facility], [address] [place] [address] #: [phone] Route: Both Nostrils We Performed the Following REFERRAL TO GASTROENTEROLOGY [STM23 Custom] Comments:  
 Refer to Dr Jessica Ramírez for colon cancer screening Follow-up Instructions Return in about 1 year (around 3/13/2019) for physical, labs 1 week before. Referral Information Referral ID Referred By Referred To  
  
 [id number] JONATHAN HORN MD   
   [address] [place] [address] [address] [place], [address] [place] [address] Str. Phone: [phone] Fax: [phone] Visits Status Start Date End Date 1 New Request 3/13/18 3/13/19 If your referral has a status of pending review or denied, additional information will be sent to support the outcome of this decision. Patient Instructions Heart-Healthy Diet: Care Instructions Your Care Instructions A heart-healthy diet has lots of vegetables, fruits, nuts, beans, and whole grains, and is low in salt. It limits foods that are high in saturated fat, such as meats, cheeses, and fried foods. It may be hard to change your diet, but even small changes can lower your risk of heart attack and heart disease. Follow-up care is a key part of your treatment and safety. Be sure to make and go to all appointments, and call your doctor if you are having problems. It's also a good idea to know your test results and keep a list of the medicines you take. How can you care for yourself at home? Watch your portions · Learn what a serving is. A \"serving\" and a \"portion\" are not always the same thing. Make sure that you are not eating larger portions than are recommended. For example, a serving of pasta is ½ cup. A serving size of meat is 2 to 3 ounces. A 3-ounce serving is about the size of a deck of cards. Measure serving sizes until you are good at [place]" them. Keep in mind that restaurants often serve portions that are 2 or 3 times the size of one serving. · To keep your energy level up and keep you from feeling hungry, eat often but in smaller portions. · Eat only the number of calories you need to stay at a healthy weight. If you need to lose weight, eat fewer calories than your body burns (through exercise and other physical activity). Eat more fruits and vegetables · Eat a variety of fruit and vegetables every day. Dark green, deep orange, red, or yellow fruits and vegetables are especially good for you. Examples include spinach, carrots, peaches, and berries. · Keep carrots, celery, and other veggies handy for snacks. Buy fruit that is in season and store it where you can see it so that you will be tempted to eat it. · Cook dishes that have a lot of veggies in them, such as stir-fries and soups. Limit saturated and trans fat · Read food labels, and try to avoid saturated and trans fats. They increase your risk of heart disease. Trans fat is found in many processed foods such as cookies and crackers. · Use olive or canola oil when you cook. Try cholesterol-lowering spreads, such as Benecol or Take Control. · Bake, broil, grill, or steam foods instead of frying them. · Choose lean meats instead of high-fat meats such as hot dogs and sausages. Cut off all visible fat when you prepare meat. · Eat fish, skinless poultry, and meat alternatives such as soy products instead of high-fat meats. Soy products, such as tofu, may be especially good for your heart. · Choose low-fat or fat-free milk and dairy products. Eat fish · Eat at least two servings of fish a week. Certain fish, such as salmon and tuna, contain omega-3 fatty acids, which may help reduce your risk of heart attack. Eat foods high in fiber · Eat a variety of grain products every day. Include whole-grain foods that have lots of fiber and nutrients. Examples of whole-grain foods include oats, whole wheat bread, and brown rice. · Buy whole-grain breads and cereals, instead of white bread or pastries. Limit salt and sodium · Limit how much salt and sodium you eat to help lower your blood pressure. · Taste food before you salt it. Add only a little salt when you think you need it. With time, your taste buds will adjust to less salt. · Eat fewer snack items, fast foods, and other high-salt, processed foods. Check food labels for the amount of sodium in packaged foods. · Choose low-sodium versions of canned goods (such as soups, vegetables, and beans). Limit sugar · Limit drinks and foods with added sugar. These include candy, desserts, and soda pop. Limit alcohol · Limit alcohol to no more than 2 drinks a day for men and 1 drink a day for women. Too much alcohol can cause health problems. When should you call for help? Watch closely for changes in your health, and be sure to contact your doctor if: 
? · You would like help planning heart-healthy meals. Where can you learn more? Go to http://deni-freddy.info/. Enter V137 in the search box to learn more about \"Heart-Healthy Diet: Care Instructions. \" Current as of: September 21, 2016 Content Version: 11.4 © 3515-4081 [employer]. Care instructions adapted under license by [employer] (which disclaims liability or warranty for this information). If you have questions about a medical condition or this instruction, always ask your healthcare professional. [address] any warranty or liability for your use of this information. Introducing [facility] & HEALTH SERVICES! Endy Peterson introduces [employer] patient portal. Now you can access parts of your medical record, email your doctor's office, and request medication refills online. 1. In your internet browser, go to [email or web address] 2. Click on the First Time User? Click Here link in the Sign In box. You will see the New Member Sign Up page. 3. Enter your [employer] Access Code exactly as it appears below. You will not need to use this code after youve completed the sign-up process. If you do not sign up before the expiration date, you must request a new code. · [employer] Access Code: [email or web address] Expires: 4/17/2018  2:27 PM 
 
4. Enter the last four digits of your Social Security Number (xxxx) and Date of Birth (mm/dd/yyyy) as indicated and click Submit. You will be taken to the next sign-up page. 5. Create a [employer] ID.  This will be your [employer] login ID and cannot be changed, so think of one that is secure and easy to remember. 6. Create a [employer] password. You can change your password at any time. 7. Enter your Password Reset Question and Answer. This can be used at a later time if you forget your password. 8. Enter your e-mail address. You will receive e-mail notification when new information is available in [address] 9. Click Sign Up. You can now view and download portions of your medical record. 10. Click the Download Summary menu link to download a portable copy of your medical information. If you have questions, please visit the Frequently Asked Questions section of the [employer] website. Remember, [employer] is NOT to be used for urgent needs. For medical emergencies, dial 911. Now available from your iPhone and Android! Please provide this summary of care documentation to your next provider. Your primary care clinician is listed as JONATHAN HORN. If you have any questions after today's visit, please call [phone].

## 2018-03-13 NOTE — PROGRESS NOTES
Patient is in the office today for a 4 month follow up. Patient states he is having left shoulder and elbow pain. 1. Have you been to the ER, urgent care clinic since your last visit? Hospitalized since your last visit? No    2. Have you seen or consulted any other health care providers outside of the [address] [place] [address] since your last visit? Include any pap smears or colon screening.  No

## 2018-03-13 NOTE — PROGRESS NOTES
.    Subjective:   Nimesh Mathis is a 50 y.o. male presenting for his annual checkup. ROS:  Feeling well. No dyspnea or chest pain on exertion. No abdominal pain, change in bowel habits, black or bloody stools. No urinary tract or prostatic symptoms. No neurological complaints. Specific concerns today: pt is feeling better on Testosterone injections with urology. He is losing weight and feeling better. His insomnia is better on Ambien. He continues to use nasal steroids for his allergies. Patient Active Problem List   Diagnosis Code    Fatigue R53.83    AR (allergic rhinitis) J30.9    High cholesterol E78.00    Hypogonadism male E29.1    Back pain, chronic M54.9, G89.29    Tobacco dependence F17.200    Sleep apnea G47.30    Gastroesophageal reflux disease without esophagitis K21.9     Current Outpatient Prescriptions   Medication Sig Dispense Refill    triamcinolone (NASACORT AQ) 55 mcg nasal inhaler 2 Sprays by Both Nostrils route daily as needed. 3 Bottle 2    zolpidem (AMBIEN) 10 mg tablet Take 1 Tab by mouth nightly as needed for Sleep. Max Daily Amount: 10 mg. 30 Tab 3    testosterone cypionate (DEPOTESTOTERONE CYPIONATE) 200 mg/mL injection 200 mg by IntraMUSCular route every twenty-one (21) days.  omeprazole (PRILOSEC) 40 mg capsule take 1 capsule by mouth once daily 90 Cap 2    cyclobenzaprine (FLEXERIL) 10 mg tablet Take 1 Tab by mouth nightly.  30 Tab 1    varenicline (CHANTIX STARTER MARGA) 0.5 mg (11)- 1 mg (42) DsPk Per Dose pack instructions 1 Dose Pack 0        Lab Results   Component Value Date/Time    WBC 7.3 03/07/2018 09:15 AM    HGB 15.2 03/07/2018 09:15 AM    HCT 46.1 03/07/2018 09:15 AM    PLATELET 880 63/07/4690 09:15 AM    MCV 95.4 03/07/2018 09:15 AM     Lab Results   Component Value Date/Time    Cholesterol, total 197 03/07/2018 09:15 AM    HDL Cholesterol 61 (H) 03/07/2018 09:15 AM    LDL, calculated 93 03/07/2018 09:15 AM    Triglyceride 215 (H) 03/07/2018 09:15 AM    CHOL/HDL Ratio 3.2 03/07/2018 09:15 AM     Lab Results   Component Value Date/Time    Prostate Specific Ag 0.5 03/07/2018 09:15 AM    Prostate Specific Ag 0.5 12/04/2017 04:00 PM    Prostate Specific Ag 0.4 10/23/2017 08:34 AM    Prostate Specific Ag 0.4 11/03/2015 08:43 AM    Prostate Specific Ag 0.3 10/28/2014 08:09 AM    Prostate Specific Ag 0.4 10/22/2013 08:35 AM     Lab Results   Component Value Date/Time    Sodium 140 03/07/2018 09:15 AM    Potassium 4.5 03/07/2018 09:15 AM    Chloride 105 03/07/2018 09:15 AM    CO2 30 03/07/2018 09:15 AM    Anion gap 5 03/07/2018 09:15 AM    Glucose 92 03/07/2018 09:15 AM    BUN 14 03/07/2018 09:15 AM    Creatinine 0.90 03/07/2018 09:15 AM    BUN/Creatinine ratio 16 03/07/2018 09:15 AM    GFR est AA >60 03/07/2018 09:15 AM    GFR est non-AA >60 03/07/2018 09:15 AM    Calcium 9.3 03/07/2018 09:15 AM    Bilirubin, total 0.5 03/07/2018 09:15 AM    ALT (SGPT) 28 03/07/2018 09:15 AM    AST (SGOT) 19 03/07/2018 09:15 AM    Alk. phosphatase 53 03/07/2018 09:15 AM    Protein, total 7.0 03/07/2018 09:15 AM    Albumin 3.9 03/07/2018 09:15 AM    Globulin 3.1 03/07/2018 09:15 AM    A-G Ratio 1.3 03/07/2018 09:15 AM         Objective:     Visit Vitals    /88 (BP 1 Location: Left arm, BP Patient Position: Sitting)    Pulse 99    Temp 98.4 °F (36.9 °C) (Oral)    Resp 20    Ht 6' 4\" (1.93 m)    Wt 245 lb 3.2 oz (111.2 kg)    SpO2 96%    BMI 29.85 kg/m2     The patient appears well, alert, oriented x 3, in no distress. ENT normal.  Neck supple. No adenopathy or thyromegaly. SANTINO. Lungs are clear, good air entry, no wheezes, rhonchi or rales. S1 and S2 normal, no murmurs, regular rate and rhythm. Abdomen is soft without tenderness, guarding, mass or organomegaly. Umbilical hernia non tender to palpation. Extremities show no edema, normal peripheral pulses. Neurological is normal without focal findings.     Assessment/Plan:   healthy adult male  lose weight, increase physical activity, call if any problems. ICD-10-CM ICD-9-CM    1. Routine medical exam Z00.00 V70.0 LIPID PANEL      METABOLIC PANEL, COMPREHENSIVE      CBC WITH AUTOMATED DIFF   2. Primary insomnia F51.01 307.42 Doing well on zolpidem (AMBIEN) 10 mg tablet   3. Hypogonadism male E29.1 257.2 Doing well on testosterone with Urology TESTOSTERONE, TOTAL, ADULT MALE   4. Colon cancer screening Z12.11 V76.51 REFERRAL TO GASTROENTEROLOGY   5. Acute seasonal allergic rhinitis due to pollen J30.1 477.0 Controlled on triamcinolone (NASACORT AQ) 55 mcg nasal inhaler   6. Screening PSA (prostate specific antigen) Z12.5 V76.44 PSA, DIAGNOSTIC (PROSTATE SPECIFIC AG)   . Follow-up Disposition:  Return in about 1 year (around 3/13/2019) for physical, labs 1 week before. Reviewed plan of care. Patient has provided input and agrees with goals. Discussed the patient's BMI with him. The BMI follow up plan is as follows:     dietary management education, guidance, and counseling  encourage exercise  monitor weight  prescribed dietary intake    An After Visit Summary was printed and given to the patient.

## 2018-04-01 DIAGNOSIS — K21.9 GASTROESOPHAGEAL REFLUX DISEASE WITHOUT ESOPHAGITIS: ICD-10-CM

## 2018-04-01 RX ORDER — OMEPRAZOLE 40 MG/1
CAPSULE, DELAYED RELEASE ORAL
Qty: 90 CAP | Refills: 2 | Status: SHIPPED | OUTPATIENT
Start: 2018-04-01 | End: 2019-03-14

## 2018-04-05 NOTE — PROGRESS NOTES
[facility] [address], [address] [place] [address], [address] [place], [place] [address],[place] [address] [phone] fax    Discharge Summary    Patient name: Nick Sutherland     Start of Care: 18  Referral source: Chilango Tejada MD    : 1965  Medical/Treatment Diagnosis: Pain in right shoulder [M25.511]  Onset Date:4 months  Prior Hospitalization: see medical history   Provider#: [id number]  Comorbidities: none   Prior Level of Function: I all areas of ADLS and activities with some R shoulder pain, baseball with son, household chores,       Medications: Verified on Patient Summary List    Visits from Start of Care:1   Missed Visits:1  Reporting Period : 18 to 18      Summary of Care:Patient seen for evaluation only and no follow up sessions. There has been no further contact. He should follow up with his MD as needed. Thank you.    Jose Dowd will have established and be independent with [facility] to aid with progression of skilled PT program  Status at last note/certification:eval  Status at discharge: met    Goal:patient will have FOTO Shoulder improved to 66 to show increase tolerance to ADLS  Status at last note/certification:eval  Status at discharge: not met    Goal:patient will have FOTO Shoulder improved to 72 to show increase tolerance to ADLS  Status at last note/certification:eval  Status at discharge: not met    Goal:patient will report overall 50% improvement to aid with increase tolerance to ADLs and activities with his son  Status at last note/certification:eval  Status at discharge: not met      ASSESSMENT/RECOMMENDATIONS:  []Discontinue therapy progressing towards or have reached established goals  []Discontinue therapy due to lack of appreciable progress towards goals  [x]Discontinue therapy due to lack of attendance or compliance  [x]Other:no further contact  Thank you for this referral.     Lex Newsome, PT 2018 9:15 AM

## 2018-08-29 ENCOUNTER — HOSPITAL ENCOUNTER (OUTPATIENT)
Dept: LAB | Age: 53
Discharge: HOME | End: 2018-08-29
Payer: COMMERCIAL

## 2018-08-29 ENCOUNTER — OFFICE VISIT (OUTPATIENT)
Dept: UROLOGY | Age: 53
End: 2018-08-29

## 2018-08-29 VITALS
WEIGHT: 244 LBS | OXYGEN SATURATION: 99 % | TEMPERATURE: 98.1 F | HEART RATE: 86 BPM | DIASTOLIC BLOOD PRESSURE: 80 MMHG | HEIGHT: 76 IN | SYSTOLIC BLOOD PRESSURE: 110 MMHG | BODY MASS INDEX: 29.71 KG/M2

## 2018-08-29 DIAGNOSIS — Z79.890 ENCOUNTER FOR MONITORING TESTOSTERONE REPLACEMENT THERAPY: Primary | ICD-10-CM

## 2018-08-29 DIAGNOSIS — E29.1 HYPOGONADISM MALE: ICD-10-CM

## 2018-08-29 DIAGNOSIS — Z51.81 ENCOUNTER FOR MONITORING TESTOSTERONE REPLACEMENT THERAPY: Primary | ICD-10-CM

## 2018-08-29 DIAGNOSIS — Z79.890 ENCOUNTER FOR MONITORING TESTOSTERONE REPLACEMENT THERAPY: ICD-10-CM

## 2018-08-29 DIAGNOSIS — Z51.81 ENCOUNTER FOR MONITORING TESTOSTERONE REPLACEMENT THERAPY: ICD-10-CM

## 2018-08-29 LAB
BILIRUB UR QL STRIP: NEGATIVE
ERYTHROCYTE [DISTWIDTH] IN BLOOD BY AUTOMATED COUNT: 12.9 % (ref 11.6–14.5)
GLUCOSE UR-MCNC: NEGATIVE MG/DL
HCT VFR BLD AUTO: 45.6 % (ref 36–48)
HGB BLD-MCNC: 15.1 G/DL (ref 13–16)
KETONES P FAST UR STRIP-MCNC: NORMAL MG/DL
MCH RBC QN AUTO: 32.3 PG (ref 24–34)
MCHC RBC AUTO-ENTMCNC: 33.1 G/DL (ref 31–37)
MCV RBC AUTO: 97.4 FL (ref 74–97)
PH UR STRIP: 6.5 [PH] (ref 4.6–8)
PLATELET # BLD AUTO: 239 K/UL (ref 135–420)
PMV BLD AUTO: 11.3 FL (ref 9.2–11.8)
PROT UR QL STRIP: NEGATIVE
PSA SERPL-MCNC: 0.6 NG/ML (ref 0–4)
RBC # BLD AUTO: 4.68 M/UL (ref 4.7–5.5)
SP GR UR STRIP: 1.02 (ref 1–1.03)
UA UROBILINOGEN AMB POC: NORMAL (ref 0.2–1)
URINALYSIS CLARITY POC: CLEAR
URINALYSIS COLOR POC: YELLOW
URINE BLOOD POC: NORMAL
URINE LEUKOCYTES POC: NEGATIVE
URINE NITRITES POC: NEGATIVE
WBC # BLD AUTO: 8.9 K/UL (ref 4.6–13.2)

## 2018-08-29 PROCEDURE — 85027 COMPLETE CBC AUTOMATED: CPT | Performed by: UROLOGY

## 2018-08-29 PROCEDURE — 84153 ASSAY OF PSA TOTAL: CPT | Performed by: UROLOGY

## 2018-08-29 NOTE — PATIENT INSTRUCTIONS
Hypogonadism: Care Instructions  Your Care Instructions    Men who have hypogonadism do not make enough testosterone. This hormone allows men to make sperm and to have normal physical male traits. The condition also is known as testosterone deficiency. It can lead to loss of sex drive, weakness, impotence, infertility, and weakened bones. Many things can cause this condition. Causes include injured testicles, certain medicines, an infection, and aging. Having a long-term health problem such as kidney or liver disease or being obese can cause it. So can surgery or radiation treatment for another health problem. It also can be present at birth. It is most often treated with testosterone hormone. You can get the hormone as a shot or through a patch or gel on the skin. Follow-up care is a key part of your treatment and safety. Be sure to make and go to all appointments, and call your doctor if you are having problems. It's also a good idea to know your test results and keep a list of the medicines you take. How can you care for yourself at home? · Take your medicines exactly as prescribed. Call your doctor if you think you are having a problem with your medicine. You will get more details on the specific medicines your doctor prescribes. · Follow your treatment plan. If you use testosterone hormones, follow your doctor's instructions. Hormones can help relieve many of the effects of this condition, such as impotence. But it may take weeks or months for your symptoms to improve. · Get plenty of exercise. And make sure to get plenty of calcium and vitamin D in your diet. Eat more dairy foods and green vegetables. They can help keep your bones from getting weak. · If you have a hard time dealing with this condition, talk to your doctor about joining a support group. Talking with others who have the same problems can help you cope. When should you call for help?   Call your doctor now or seek immediate medical care if:    · You have headaches.     · You have problems with your vision.    Watch closely for changes in your health, and be sure to contact your doctor if:    · You have trouble getting or keeping an erection.     · You have a loss of body hair.     · You feel weak or tired a lot of the time.     · Your breasts are getting larger.     · You do not get better as expected. Where can you learn more? Go to http://deni-freddy.info/. Enter 99 [id number] in the search box to learn more about \"Hypogonadism: Care Instructions. \"  Current as of: May 3, 2017  Content Version: 11.7  © 5051-3728 [employer]. Care instructions adapted under license by [employer] (which disclaims liability or warranty for this information). If you have questions about a medical condition or this instruction, always ask your healthcare professional. [address] any warranty or liability for your use of this information.

## 2018-08-29 NOTE — PROGRESS NOTES
Mr. Bebe Mcnair has a reminder for a \"due or due soon\" health maintenance. I have asked that he contact his primary care provider for follow-up on this health maintenance.

## 2018-08-29 NOTE — MR AVS SNAPSHOT
[address] [facility] [place] [address] [address] 
[phone] Patient: Juventino Garcia MRN: [id number] :1965 Visit Information Date & Time Provider Department Dept. Phone Encounter #  
 2018  1:30 PM Jina Bence, Karen [facility] [phone] [id number] Your Appointments 3/6/2019  1:45 PM  
Office Visit with Jina Bence, MD  
[facility]) Appt Note: cbc/PSA f/u self TRT  
 [address] [place] [address] [address]  
[phone] [address] [phone]  
  
    
 3/7/2019  7:40 AM  
LAB with [facility] ([facility] [place] [facility]) Appt Note: lab 1 yr CPE 19  
 [address], [place] [address] [address]  
[phone]  
  
   
 [place] [address]  
  
    
 3/14/2019  8:00 AM  
PHYSICAL with Jamir Conn MD  
[place] [address] [facility]) Appt Note: 1 yr  
 [address], [place] [address] [address]  
[phone]  
  
   
 [address], [address] Upcoming Health Maintenance Date Due COLONOSCOPY 1985 Pneumococcal 19-64 Medium Risk (1 of 1 - PPSV23) 1986 DTaP/Tdap/Td series (2 - Td) 2015 Influenza Age 5 to Adult 2018 Allergies as of 2018  Review Complete On: 2018 By: Kaz Owusu No Known Allergies Current Immunizations  Never Reviewed Name Date Influenza Vaccine Whole 2010 TDAP Vaccine 2005 Not reviewed this visit You Were Diagnosed With   
  
 Codes Comments Encounter for monitoring testosterone replacement therapy    -  Primary ICD-10-CM: Z51.81, [id number] ICD-9-CM: V58.83, V58.69 Hypogonadism male     ICD-10-CM: E29.1 ICD-9-CM: 257.2 Vitals BP Pulse Temp Height(growth percentile) Weight(growth percentile) SpO2 110/80 (BP 1 Location: Left arm, BP Patient Position: Sitting) 86 98.1 °F (36.7 °C) (Oral) 6' 4\" (1.93 m) 244 lb (110.7 kg) 99% BMI Smoking Status 29.7 kg/m2 Current Every Day Smoker Vitals History BMI and BSA Data Body Mass Index Body Surface Area  
 29.7 kg/m 2 2.44 m 2 Preferred Pharmacy Pharmacy Name Phone [address], [address] [place] [address] [phone] Your Updated Medication List  
  
   
This list is accurate as of 8/29/18  2:12 PM.  Always use your most recent med list.  
  
  
  
  
 cyclobenzaprine 10 mg tablet Commonly known as:  FLEXERIL Take 1 Tab by mouth nightly. omeprazole 40 mg capsule Commonly known as:  PRILOSEC  
take 1 capsule by mouth once daily  
  
 testosterone cypionate 200 mg/mL injection Commonly known as:  DEPOTESTOTERONE CYPIONATE  
200 mg by IntraMUSCular route every twenty-one (21) days. triamcinolone 55 mcg nasal inhaler Commonly known as:  NASACORT AQ  
2 Sprays by Both Nostrils route daily as needed. varenicline 0.5 mg (11)- 1 mg (42) Dspk Commonly known as:  CHANTIX STARTER MARGA Per Dose pack instructions  
  
 zolpidem 10 mg tablet Commonly known as:  AMBIEN Take 1 Tab by mouth nightly as needed for Sleep. Max Daily Amount: 10 mg. We Performed the Following AMB POC URINALYSIS DIP STICK AUTO W/O MICRO [52713 CPT(R)] COLLECTION VENOUS BLOOD,VENIPUNCTURE [id number] CPT(R)] Patient Instructions Hypogonadism: Care Instructions Your Care Instructions Men who have hypogonadism do not make enough testosterone. This hormone allows men to make sperm and to have normal physical male traits. The condition also is known as testosterone deficiency. It can lead to loss of sex drive, weakness, impotence, infertility, and weakened bones. Many things can cause this condition.  Causes include injured testicles, certain medicines, an infection, and aging. Having a long-term health problem such as kidney or liver disease or being obese can cause it. So can surgery or radiation treatment for another health problem. It also can be present at birth. It is most often treated with testosterone hormone. You can get the hormone as a shot or through a patch or gel on the skin. Follow-up care is a key part of your treatment and safety. Be sure to make and go to all appointments, and call your doctor if you are having problems. It's also a good idea to know your test results and keep a list of the medicines you take. How can you care for yourself at home? · Take your medicines exactly as prescribed. Call your doctor if you think you are having a problem with your medicine. You will get more details on the specific medicines your doctor prescribes. · Follow your treatment plan. If you use testosterone hormones, follow your doctor's instructions. Hormones can help relieve many of the effects of this condition, such as impotence. But it may take weeks or months for your symptoms to improve. · Get plenty of exercise. And make sure to get plenty of calcium and vitamin D in your diet. Eat more dairy foods and green vegetables. They can help keep your bones from getting weak. · If you have a hard time dealing with this condition, talk to your doctor about joining a support group. Talking with others who have the same problems can help you cope. When should you call for help? Call your doctor now or seek immediate medical care if: 
  · You have headaches.  
  · You have problems with your vision.  
 Watch closely for changes in your health, and be sure to contact your doctor if: 
  · You have trouble getting or keeping an erection.  
  · You have a loss of body hair.  
  · You feel weak or tired a lot of the time.  
  · Your breasts are getting larger.  
  · You do not get better as expected. Where can you learn more? Go to http://deni-freddy.info/. Enter 99 [id number] in the search box to learn more about \"Hypogonadism: Care Instructions. \" Current as of: May 3, 2017 Content Version: 11.7 © 3478-6676 [employer]. Care instructions adapted under license by [employer] (which disclaims liability or warranty for this information). If you have questions about a medical condition or this instruction, always ask your healthcare professional. [address] any warranty or liability for your use of this information. Introducing [facility] [place] [facility]! [facility] introduces Montage Studio patient portal. Now you can access parts of your medical record, email your doctor's office, and request medication refills online. 1. In your internet browser, go to [email or web address]. [employer] 2. Click on the First Time User? Click Here link in the Sign In box. You will see the New Member Sign Up page. 3. Enter your Montage Studio Access Code exactly as it appears below. You will not need to use this code after youve completed the sign-up process. If you do not sign up before the expiration date, you must request a new code. · Montage Studio Access Code: [email or web address] Expires: 11/27/2018  1:25 PM 
 
4. Enter the last four digits of your Social Security Number (xxxx) and Date of Birth (mm/dd/yyyy) as indicated and click Submit. You will be taken to the next sign-up page. 5. Create a Montage Studio ID. This will be your Montage Studio login ID and cannot be changed, so think of one that is secure and easy to remember. 6. Create a [employer] Studio password. You can change your password at any time. 7. Enter your Password Reset Question and Answer. This can be used at a later time if you forget your password. 8. Enter your e-mail address. You will receive e-mail notification when new information is available in [address] 9. Click Sign Up. You can now view and download portions of your medical record. 10. Click the Download Summary menu link to download a portable copy of your medical information. If you have questions, please visit the Frequently Asked Questions section of the [employer] website. Remember, [employer] is NOT to be used for urgent needs. For medical emergencies, dial 911. Now available from your iPhone and Android! Please provide this summary of care documentation to your next provider. Your primary care clinician is listed as JONATHAN HORN. If you have any questions after today's visit, please call [phone].

## 2018-08-30 NOTE — PROGRESS NOTES
Julio Alfred 50 y.o. male     Mr. Jose Hidalgo seen today for hypogonadism follow-up on testosterone replacement therapy by intramuscular injection of testosterone 200 mg every 3 weeks with injections performed by spouse at home  Patient reports sustained benefit in any good strength, stamina, libido, and erectile function-overall marked improvement in sense of well-being    hypogonadism diagnosed and previously treated with application of testosterone gel to skin without appreciable therapeutic benefit   referred to urology for consideration of testosterone replacement therapy by injection of testosterone intramuscularly  Patient has no history of  tract disease, trauma, or surgery   Patient has easy fatigability, lethargy, erectile dysfunction and poor libido          Testosterone 144 in October 2017  PSA 0.4 in October 2017       Testosterone function panel 5 December 2017:  Testosterone 49  FSH 1.2  LH 3.4  Prolactin 10.4      PSA 0.5 on 4 December 2017  Hematocrit 44.7  \"          Review of Systems:   CNS: No seizure headaches dizziness visual changes  Respiratory: No wheezing no shortness of breath no chest pain  Cardiovascular: No palpitations no angina  Intestinal: GERD  Urinary: No urgency frequency dysuria or hematuria  Skeletal: Chronic low back pain  Endocrine: No bone or joint pain  Other:                                                                                       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 3 Bottle 2    testosterone cypionate (DEPOTESTOTERONE CYPIONATE) 200 mg/mL injection 200 mg by IntraMUSCular route every twenty-one (21) days.       zolpidem (AMBIEN) 10 mg tablet Take 1 Tab by mouth nightly as needed for Sleep. Max Daily Amount: 10 mg. 30 Tab 3    cyclobenzaprine (FLEXERIL) 10 mg tablet Take 1 Tab by mouth nightly. 30 Tab 1    varenicline (CHANTIX STARTER MARGA) 0.5 mg (11)- 1 mg (42) DsPk Per Dose pack instructions 1 Dose Pack 0       Past Medical History:   Diagnosis Date    High cholesterol 9/13/2013    Sleep apnea       Past Surgical History:   Procedure Laterality Date    HX ORTHOPAEDIC      Broken Sturnum      Social History     Social History    Marital status:      Spouse name: N/A    Number of children: N/A    Years of education: N/A     Occupational History    Not on file. Social History Main Topics    Smoking status: Current Every Day Smoker     Packs/day: 0.50     Years: 8.00    Smokeless tobacco: Never Used    Alcohol use 0.0 oz/week     0 Standard drinks or equivalent per week      Comment: Drinks heavily 2 times a week.  Drug use: Yes      Comment: cocaine powder    Sexual activity: Not on file     Other Topics Concern    Not on file     Social History Narrative      Family History   Problem Relation Age of Onset    Hypertension Mother     Stroke Mother     Diabetes Mother     Heart Disease Mother     Heart Disease Father         Physical Examination: Well-nourished mature male in no apparent distress        Urinalysis negative dipstick/nitrite negative/heme-negative:     Impression: Responding favorably to testosterone replacement therapy by intramuscular injection        Plan: Testosterone 200 mg IM every 3 weeks    PSA CBC today    RTC 6 months PSA CBC WHITLEY      More than 1/2 of this 10 minute visit was spent in counselling and coordination of care, as described above. Wai Santos MD  -electronically signed-    PLEASE NOTE:  This document has been produced using voice recognition software. Unrecognized errors in transcription may be present.

## 2018-09-18 ENCOUNTER — DOCUMENTATION ONLY (OUTPATIENT)
Dept: UROLOGY | Age: 53
End: 2018-09-18

## 2019-01-14 ENCOUNTER — TELEPHONE (OUTPATIENT)
Dept: FAMILY MEDICINE CLINIC | Age: 54
End: 2019-01-14

## 2019-01-14 DIAGNOSIS — F51.01 PRIMARY INSOMNIA: ICD-10-CM

## 2019-01-14 RX ORDER — ZOLPIDEM TARTRATE 10 MG/1
10 TABLET ORAL
Qty: 30 TAB | Refills: 3 | Status: SHIPPED | OUTPATIENT
Start: 2019-01-14 | End: 2019-12-09 | Stop reason: SDUPTHER

## 2019-03-07 ENCOUNTER — LAB ONLY (OUTPATIENT)
Dept: FAMILY MEDICINE CLINIC | Age: 54
End: 2019-03-07

## 2019-03-07 ENCOUNTER — HOSPITAL ENCOUNTER (OUTPATIENT)
Dept: LAB | Age: 54
Discharge: HOME | End: 2019-03-07
Payer: COMMERCIAL

## 2019-03-07 DIAGNOSIS — Z12.5 SCREENING PSA (PROSTATE SPECIFIC ANTIGEN): ICD-10-CM

## 2019-03-07 DIAGNOSIS — E29.1 HYPOGONADISM MALE: ICD-10-CM

## 2019-03-07 DIAGNOSIS — Z00.00 ROUTINE MEDICAL EXAM: ICD-10-CM

## 2019-03-07 LAB
ALBUMIN SERPL-MCNC: 3.9 G/DL (ref 3.4–5)
ALBUMIN/GLOB SERPL: 1.4 {RATIO} (ref 0.8–1.7)
ALP SERPL-CCNC: 56 U/L (ref 45–117)
ALT SERPL-CCNC: 41 U/L (ref 16–61)
ANION GAP SERPL CALC-SCNC: 9 MMOL/L (ref 3–18)
AST SERPL-CCNC: 22 U/L (ref 15–37)
BASOPHILS # BLD: 0.1 K/UL (ref 0–0.1)
BASOPHILS NFR BLD: 2 % (ref 0–2)
BILIRUB SERPL-MCNC: 0.4 MG/DL (ref 0.2–1)
BUN SERPL-MCNC: 15 MG/DL (ref 7–18)
BUN/CREAT SERPL: 15 (ref 12–20)
CALCIUM SERPL-MCNC: 8.5 MG/DL (ref 8.5–10.1)
CHLORIDE SERPL-SCNC: 105 MMOL/L (ref 100–108)
CHOLEST SERPL-MCNC: 180 MG/DL
CO2 SERPL-SCNC: 27 MMOL/L (ref 21–32)
CREAT SERPL-MCNC: 1 MG/DL (ref 0.6–1.3)
DIFFERENTIAL METHOD BLD: ABNORMAL
EOSINOPHIL # BLD: 0.2 K/UL (ref 0–0.4)
EOSINOPHIL NFR BLD: 4 % (ref 0–5)
ERYTHROCYTE [DISTWIDTH] IN BLOOD BY AUTOMATED COUNT: 12.3 % (ref 11.6–14.5)
GLOBULIN SER CALC-MCNC: 2.8 G/DL (ref 2–4)
GLUCOSE SERPL-MCNC: 92 MG/DL (ref 74–99)
HCT VFR BLD AUTO: 46.3 % (ref 36–48)
HDLC SERPL-MCNC: 65 MG/DL (ref 40–60)
HDLC SERPL: 2.8 {RATIO} (ref 0–5)
HGB BLD-MCNC: 15 G/DL (ref 13–16)
LDLC SERPL CALC-MCNC: 68.6 MG/DL (ref 0–100)
LIPID PROFILE,FLP: ABNORMAL
LYMPHOCYTES # BLD: 2 K/UL (ref 0.9–3.6)
LYMPHOCYTES NFR BLD: 31 % (ref 21–52)
MCH RBC QN AUTO: 31.6 PG (ref 24–34)
MCHC RBC AUTO-ENTMCNC: 32.4 G/DL (ref 31–37)
MCV RBC AUTO: 97.7 FL (ref 74–97)
MONOCYTES # BLD: 0.5 K/UL (ref 0.05–1.2)
MONOCYTES NFR BLD: 7 % (ref 3–10)
NEUTS SEG # BLD: 3.7 K/UL (ref 1.8–8)
NEUTS SEG NFR BLD: 56 % (ref 40–73)
PLATELET # BLD AUTO: 292 K/UL (ref 135–420)
PMV BLD AUTO: 11.1 FL (ref 9.2–11.8)
POTASSIUM SERPL-SCNC: 4.1 MMOL/L (ref 3.5–5.5)
PROT SERPL-MCNC: 6.7 G/DL (ref 6.4–8.2)
PSA SERPL-MCNC: 0.5 NG/ML (ref 0–4)
RBC # BLD AUTO: 4.74 M/UL (ref 4.7–5.5)
SODIUM SERPL-SCNC: 141 MMOL/L (ref 136–145)
TRIGL SERPL-MCNC: 232 MG/DL (ref ?–150)
VLDLC SERPL CALC-MCNC: 46.4 MG/DL
WBC # BLD AUTO: 6.6 K/UL (ref 4.6–13.2)

## 2019-03-07 PROCEDURE — 84153 ASSAY OF PSA TOTAL: CPT

## 2019-03-07 PROCEDURE — 36415 COLL VENOUS BLD VENIPUNCTURE: CPT

## 2019-03-07 PROCEDURE — 80061 LIPID PANEL: CPT

## 2019-03-07 PROCEDURE — 84403 ASSAY OF TOTAL TESTOSTERONE: CPT

## 2019-03-07 PROCEDURE — 85025 COMPLETE CBC W/AUTO DIFF WBC: CPT

## 2019-03-07 PROCEDURE — 80053 COMPREHEN METABOLIC PANEL: CPT

## 2019-03-08 LAB — TESTOST SERPL-MCNC: 416 NG/DL (ref 264–916)

## 2019-03-14 ENCOUNTER — OFFICE VISIT (OUTPATIENT)
Dept: FAMILY MEDICINE CLINIC | Age: 54
End: 2019-03-14

## 2019-03-14 VITALS
WEIGHT: 243.8 LBS | RESPIRATION RATE: 18 BRPM | TEMPERATURE: 97.9 F | OXYGEN SATURATION: 97 % | HEART RATE: 107 BPM | HEIGHT: 76 IN | BODY MASS INDEX: 29.69 KG/M2 | DIASTOLIC BLOOD PRESSURE: 80 MMHG | SYSTOLIC BLOOD PRESSURE: 140 MMHG

## 2019-03-14 DIAGNOSIS — K21.9 GASTROESOPHAGEAL REFLUX DISEASE WITHOUT ESOPHAGITIS: ICD-10-CM

## 2019-03-14 DIAGNOSIS — J30.1 ACUTE SEASONAL ALLERGIC RHINITIS DUE TO POLLEN: ICD-10-CM

## 2019-03-14 DIAGNOSIS — Z00.00 PHYSICAL EXAM: Primary | ICD-10-CM

## 2019-03-14 DIAGNOSIS — F17.200 TOBACCO DEPENDENCE: ICD-10-CM

## 2019-03-14 DIAGNOSIS — Z12.11 COLON CANCER SCREENING: ICD-10-CM

## 2019-03-14 RX ORDER — VARENICLINE TARTRATE 1 MG/1
1 TABLET, FILM COATED ORAL 2 TIMES DAILY
Qty: 60 TAB | Refills: 2 | Status: SHIPPED | OUTPATIENT
Start: 2019-03-14 | End: 2021-01-18 | Stop reason: ALTCHOICE

## 2019-03-14 RX ORDER — OMEPRAZOLE 20 MG/1
CAPSULE, DELAYED RELEASE ORAL
Qty: 180 CAP | Refills: 3 | Status: SHIPPED | OUTPATIENT
Start: 2019-03-14 | End: 2020-03-29

## 2019-03-14 RX ORDER — TRIAMCINOLONE ACETONIDE 55 UG/1
2 SPRAY, METERED NASAL
Qty: 3 BOTTLE | Refills: 2 | Status: SHIPPED | OUTPATIENT
Start: 2019-03-14 | End: 2021-01-18 | Stop reason: ALTCHOICE

## 2019-03-14 RX ORDER — VARENICLINE TARTRATE 25 MG
KIT ORAL
Qty: 1 DOSE PACK | Refills: 0 | Status: SHIPPED | OUTPATIENT
Start: 2019-03-14 | End: 2021-01-18 | Stop reason: ALTCHOICE

## 2019-03-14 NOTE — PROGRESS NOTES
1. Have you been to the ER, urgent care clinic since your last visit? Hospitalized since your last visit? [facility] January for allergies    2. Have you seen or consulted any other health care providers outside of the [address] [place] [address] since your last visit? Include any pap smears or colon screening.  No        Chief Complaint   Patient presents with    Annual Exam

## 2019-03-14 NOTE — PROGRESS NOTES
.    Subjective:   Gisela Farley is a 48 y.o. male presenting for his annual checkup. ROS:  Feeling well. No dyspnea or chest pain on exertion. No abdominal pain, change in bowel habits, black or bloody stools. No urinary tract or prostatic symptoms. No neurological complaints. Specific concerns today: pt is feeling better on Testosterone injections with urology. He is losing weight and feeling better. His insomnia is better on Ambien. He continues to use nasal steroids for his allergies. He struggles to stop tobacco use and ETOH use. He would like to try Chantix. His GERD is well controlled on Prilosec 20 mg BID     Patient Active Problem List   Diagnosis Code    Fatigue R53.83    AR (allergic rhinitis) J30.9    High cholesterol E78.00    Hypogonadism male E29.1    Back pain, chronic M54.9, G89.29    Tobacco dependence F17.200    Sleep apnea G47.30    Gastroesophageal reflux disease without esophagitis K21.9     Current Outpatient Medications   Medication Sig Dispense Refill    varenicline (CHANTIX STARTER MARGA) 0.5 mg (11)- 1 mg (42) DsPk Per Dose pack instructions 1 Dose Pack 0    varenicline (CHANTIX) 1 mg tablet Take 1 Tab by mouth two (2) times a day. 60 Tab 2    omeprazole (PRILOSEC) 20 mg capsule take 1 capsule by mouth   Cap 3    triamcinolone (NASACORT AQ) 55 mcg nasal inhaler 2 Sprays by Both Nostrils route daily as needed (allergic rhintis). 3 Bottle 2    zolpidem (AMBIEN) 10 mg tablet Take 1 Tab by mouth nightly as needed for Sleep. Max Daily Amount: 10 mg. 30 Tab 3    testosterone cypionate (DEPOTESTOTERONE CYPIONATE) 200 mg/mL injection 200 mg by IntraMUSCular route every twenty-one (21) days.  cyclobenzaprine (FLEXERIL) 10 mg tablet Take 1 Tab by mouth nightly.  30 Tab 1        Lab Results   Component Value Date/Time    WBC 6.6 03/07/2019 07:41 AM    HGB 15.0 03/07/2019 07:41 AM    HCT 46.3 03/07/2019 07:41 AM    PLATELET 215 70/34/7039 07:41 AM    MCV 97.7 (H) 03/07/2019 07:41 AM     Lab Results   Component Value Date/Time    Cholesterol, total 180 03/07/2019 07:41 AM    HDL Cholesterol 65 (H) 03/07/2019 07:41 AM    LDL, calculated 68.6 03/07/2019 07:41 AM    Triglyceride 232 (H) 03/07/2019 07:41 AM    CHOL/HDL Ratio 2.8 03/07/2019 07:41 AM     Lab Results   Component Value Date/Time    Prostate Specific Ag 0.5 03/07/2019 07:41 AM    Prostate Specific Ag 0.6 08/29/2018 01:38 PM    Prostate Specific Ag 0.5 03/07/2018 09:15 AM    Prostate Specific Ag 0.4 11/03/2015 08:43 AM    Prostate Specific Ag 0.3 10/28/2014 08:09 AM    Prostate Specific Ag 0.4 10/22/2013 08:35 AM     Lab Results   Component Value Date/Time    Sodium 141 03/07/2019 07:41 AM    Potassium 4.1 03/07/2019 07:41 AM    Chloride 105 03/07/2019 07:41 AM    CO2 27 03/07/2019 07:41 AM    Anion gap 9 03/07/2019 07:41 AM    Glucose 92 03/07/2019 07:41 AM    BUN 15 03/07/2019 07:41 AM    Creatinine 1.00 03/07/2019 07:41 AM    BUN/Creatinine ratio 15 03/07/2019 07:41 AM    GFR est AA >60 03/07/2019 07:41 AM    GFR est non-AA >60 03/07/2019 07:41 AM    Calcium 8.5 03/07/2019 07:41 AM    Bilirubin, total 0.4 03/07/2019 07:41 AM    ALT (SGPT) 41 03/07/2019 07:41 AM    AST (SGOT) 22 03/07/2019 07:41 AM    Alk. phosphatase 56 03/07/2019 07:41 AM    Protein, total 6.7 03/07/2019 07:41 AM    Albumin 3.9 03/07/2019 07:41 AM    Globulin 2.8 03/07/2019 07:41 AM    A-G Ratio 1.4 03/07/2019 07:41 AM         Objective:     Visit Vitals  /80   Pulse (!) 107   Temp 97.9 °F (36.6 °C) (Oral)   Resp 18   Ht 6' 4\" (1.93 m)   Wt 243 lb 12.8 oz (110.6 kg)   SpO2 97%   BMI 29.68 kg/m²     The patient appears well, alert, oriented x 3, in no distress. ENT normal.  Neck supple. No adenopathy or thyromegaly. SANTINO. Lungs are clear, good air entry, no wheezes, rhonchi or rales. S1 and S2 normal, no murmurs, regular rate and rhythm. Abdomen is soft without tenderness, guarding, mass or organomegaly.  Umbilical hernia non tender to palpation. Extremities show no edema, normal peripheral pulses. Neurological is normal without focal findings. Assessment/Plan:   healthy adult male  lose weight, increase physical activity, call if any problems. ICD-10-CM ICD-9-CM    1. Physical exam Z00.00 V70.9 PSA, DIAGNOSTIC (PROSTATE SPECIFIC AG)      LIPID PANEL      METABOLIC PANEL, COMPREHENSIVE      CBC WITH AUTOMATED DIFF   2. Colon cancer screening Z12.11 V76.51 REFERRAL TO GASTROENTEROLOGY   3. Tobacco dependence F17.200 305.1 Pt to try varenicline (CHANTIX STARTER MARGA) 0.5 mg (11)- 1 mg (42) DsPk   4. Gastroesophageal reflux disease without esophagitis K21.9 530.81 Well controlled on omeprazole (PRILOSEC) 20 mg capsule BID    5. Acute seasonal allergic rhinitis due to pollen J30.1 477.0 Well controlled on triamcinolone (NASACORT AQ) 55 mcg nasal inhaler       Follow-up Disposition:  Return in about 1 year (around 3/14/2020) for physical, labs 1 week before. Reviewed plan of care. Patient has provided input and agrees with goals. Discussed the patient's BMI with him. The BMI follow up plan is as follows:     dietary management education, guidance, and counseling  encourage exercise  monitor weight  prescribed dietary intake    An After Visit Summary was printed and given to the patient.

## 2019-03-19 ENCOUNTER — OFFICE VISIT (OUTPATIENT)
Dept: UROLOGY | Age: 54
End: 2019-03-19

## 2019-03-19 VITALS
DIASTOLIC BLOOD PRESSURE: 80 MMHG | HEART RATE: 91 BPM | WEIGHT: 247 LBS | SYSTOLIC BLOOD PRESSURE: 140 MMHG | BODY MASS INDEX: 30.08 KG/M2 | HEIGHT: 76 IN | OXYGEN SATURATION: 97 %

## 2019-03-19 DIAGNOSIS — E29.1 HYPOGONADISM IN MALE: Primary | ICD-10-CM

## 2019-03-19 LAB
BILIRUB UR QL STRIP: NEGATIVE
GLUCOSE UR-MCNC: NEGATIVE MG/DL
KETONES P FAST UR STRIP-MCNC: NEGATIVE MG/DL
PH UR STRIP: 5.5 [PH] (ref 4.6–8)
PROT UR QL STRIP: NEGATIVE
SP GR UR STRIP: 1.03 (ref 1–1.03)
UA UROBILINOGEN AMB POC: NORMAL (ref 0.2–1)
URINALYSIS CLARITY POC: CLEAR
URINALYSIS COLOR POC: YELLOW
URINE BLOOD POC: NEGATIVE
URINE LEUKOCYTES POC: NEGATIVE
URINE NITRITES POC: NEGATIVE

## 2019-03-19 NOTE — PROGRESS NOTES
[facility] 48 y.o. male     Mr. Pauline Figueroa seen today for follow-up hypogonadism on testosterone replacement therapy by intramuscular injection of testosterone 200 mg every 3 weeks   injection by spouse     Patient reports sustained benefit in any good strength, stamina, libido, and erectile function-overall marked improvement in sense of well-being     hypogonadism diagnosed and previously treated with application of testosterone gel to skin without appreciable therapeutic benefit   referred to urology for consideration of testosterone replacement therapy by injection of testosterone intramuscularly  Patient has no history of  tract disease, trauma, or surgery   Patient has easy fatigability, lethargy, erectile dysfunction and poor libido          Testosterone 144 in October 2017  PSA 0.4 in October 2017       Testosterone function panel 5 December 2017:  Testosterone 49  FSH 1.2  LH 3.4  Prolactin 10.4      PSA 0.5 on 4 December 2017  Hematocrit 44.7  \"    PSA 0.5.7 March 2019:  Hematocrit 46.3  Testosterone 416          Review of Systems:   CNS: No seizure headaches dizziness visual changes  Respiratory: No wheezing no shortness of breath no chest pain  Cardiovascular: No palpitations no angina  Intestinal: GERD  Urinary: No urgency frequency dysuria or hematuria  Skeletal: Chronic low back pain  Endocrine: No bone or joint pain  Other:                                                                                       of lumber hauling service         Allergies: No Known Allergies   Medications:    Current Outpatient Medications   Medication Sig Dispense Refill    omeprazole (PRILOSEC) 20 mg capsule take 1 capsule by mouth   Cap 3    triamcinolone (NASACORT AQ) 55 mcg nasal inhaler 2 Sprays by Both Nostrils route daily as needed (allergic rhintis).  3 Bottle 2    testosterone cypionate (DEPOTESTOTERONE CYPIONATE) 200 mg/mL injection 200 mg by IntraMUSCular route every twenty-one (21) days.      varenicline (CHANTIX STARTER MARGA) 0.5 mg (11)- 1 mg (42) DsPk Per Dose pack instructions 1 Dose Pack 0    varenicline (CHANTIX) 1 mg tablet Take 1 Tab by mouth two (2) times a day. 60 Tab 2    zolpidem (AMBIEN) 10 mg tablet Take 1 Tab by mouth nightly as needed for Sleep. Max Daily Amount: 10 mg. 30 Tab 3    cyclobenzaprine (FLEXERIL) 10 mg tablet Take 1 Tab by mouth nightly. 30 Tab 1       Past Medical History:   Diagnosis Date    High cholesterol 9/13/2013    Sleep apnea       Past Surgical History:   Procedure Laterality Date    HX ORTHOPAEDIC      Broken Sturnum      Social History     Socioeconomic History    Marital status:      Spouse name: Not on file    Number of children: Not on file    Years of education: Not on file    Highest education level: Not on file   Social Needs    Financial resource strain: Not on file    Food insecurity - worry: Not on file    Food insecurity - inability: Not on file   [employer] needs - medical: Not on file   [employer] needs - non-medical: Not on file   Occupational History    Not on file   Tobacco Use    Smoking status: Current Every Day Smoker     Packs/day: 0.50     Years: 8.00     Pack years: 4.00    Smokeless tobacco: Never Used   Substance and Sexual Activity    Alcohol use: Yes     Alcohol/week: 0.0 oz     Comment: Drinks heavily 2 times a week.     Drug use: Yes     Comment: cocaine powder    Sexual activity: Yes     Partners: Female     Birth control/protection: None   Other Topics Concern    Not on file   Social History Narrative    Not on file      Family History   Problem Relation Age of Onset    Hypertension Mother     Stroke Mother     Diabetes Mother     Heart Disease Mother     Heart Disease Father         Physical Examination: Well-nourished mature male in no apparent distress    Prostate by WHITLEY is rounded, smooth, benign consistency nontender no induration no nodularity  No rectal masses induration or tenderness      Urinalysis: Negative dipstick/nitrite negative/heme-negative    :     Impression: Hypogonadism responding favorably to testosterone replacement therapy by IM injection of testosterone        Plan: Testosterone 200 mg IM every 3 weeks    Rx testosterone in oil 200 mg/cc dispense 10 cc signify 1 cc IM every 3 weeks 21-gauge needles #10 3 cc syringes #10 refill x2 prescription faxed to compounding pharmacy in [facility]    [phone]    RTC 6 months PSA CBC      More than 1/2 of this 15 minute visit was spent in counselling and coordination of care, as described above. Sasha Fields MD  -electronically signed-    PLEASE NOTE:  This document has been produced using voice recognition software. Unrecognized errors in transcription may be present.

## 2019-03-19 NOTE — PATIENT INSTRUCTIONS
Testosterone (By injection)   Testosterone (jorge luis-TOS-ter-one)  Treats low testosterone levels. Also treats breast cancer in women and delayed puberty in male teenagers. Brand Name(s): Aveed, Delatestryl, Depo-Testosterone, Depo-Testosterone Novaplus, PremierPro RX Depo-Testosterone, Testone CIK   There may be other brand names for this medicine. When This Medicine Should Not Be Used: This medicine is not right for everyone. You should not receive it if you had an allergic reaction to testosterone, benzyl benzoate, or refined castor oil. A man should not receive this medicine if he has breast cancer or prostate cancer. A woman should not receive this medicine if she is pregnant or breastfeeding. How to Use This Medicine:   Injectable  · Your doctor will prescribe your exact dose and tell you how often it should be given. This medicine is given as a shot into one of your muscles. It is usually given in the buttocks. · A nurse or other health provider will give you this medicine. · This medicine should come with a Medication Guide. Ask your pharmacist for a copy if you do not have one. · Missed dose: Call your doctor or pharmacist for instructions. Drugs and Foods to Avoid:   Ask your doctor or pharmacist before using any other medicine, including over-the-counter medicines, vitamins, and herbal products. · Some medicines can affect how testosterone works. Tell your doctor if you are using any of the following:   ¨ Oxyphenbutazone  ¨ Blood thinner (including warfarin)  ¨ Insulin or diabetes medicine that you take by mouth  ¨ Steroid medicine (including dexamethasone, hydrocortisone, methylprednisolone, prednisolone, prednisone)  Warnings While Using This Medicine:   · It is not safe to take this medicine during pregnancy. It could harm an unborn baby. Tell your doctor right away if you become pregnant.   · Tell your doctor if you have kidney disease, liver disease, diabetes, an enlarged prostate, heart disease, high cholesterol, lung disease, sleep apnea, or a history of heart attack or stroke. · This medicine may cause the following problems:  ¨ Serious lung reaction called pulmonary oil embolism (may be life-threatening)  ¨ Increased risk of prostate cancer  ¨ Increased numbers of red blood cells  ¨ Blood clot in your leg or lung  ¨ Slow growth in children  ¨ Increased risk of heart attack or stroke  ¨ Lower sperm count (with large doses)  · This medicine can be habit-forming. Do not use more than your prescribed dose. Call your doctor if you think your medicine is not working. · Your doctor will do lab tests at regular visits to check on the effects of this medicine. Keep all appointments. Possible Side Effects While Using This Medicine:   Call your doctor right away if you notice any of these side effects:  · Allergic reaction: Itching or hives, swelling in your face or hands, swelling or tingling in your mouth or throat, chest tightness, trouble breathing  · Change in how much or how often you urinate, trouble urinating  · Chest pain, cough, trouble breathing, dizziness, tightening of your throat, unusual sweating  · Dark urine or pale stools, nausea, vomiting, loss of appetite, stomach pain, yellow skin or eyes  · Pain, redness, or swelling in your arm or leg  · Swelling in your hands, ankles, or feet  · Unusual bleeding, bruising, or weakness  If you notice these less serious side effects, talk with your doctor:   · Acne, hoarse voice, facial hair growth (women)  · Changes in menstrual periods  · More erections than usual or erections that last a long time  · Pain or redness where the shot was given  · Swollen breasts (men)  If you notice other side effects that you think are caused by this medicine, tell your doctor. Call your doctor for medical advice about side effects.  You may report side effects to FDA at 7-168-YJQ-8327  © 2017 [facility] Information is for End User's use only and may not be sold, redistributed or otherwise used for commercial purposes. The above information is an  only. It is not intended as medical advice for individual conditions or treatments. Talk to your doctor, nurse or pharmacist before following any medical regimen to see if it is safe and effective for you.

## 2019-03-19 NOTE — PROGRESS NOTES
Mr. Romain Fraga has a reminder for a \"due or due soon\" health maintenance. I have asked that he contact his primary care provider for follow-up on this health maintenance.

## 2019-04-23 RX ORDER — IBUPROFEN 800 MG/1
800 TABLET ORAL
Qty: 90 TAB | Refills: 2 | Status: SHIPPED | OUTPATIENT
Start: 2019-04-23

## 2019-09-24 ENCOUNTER — OFFICE VISIT (OUTPATIENT)
Dept: UROLOGY | Age: 54
End: 2019-09-24

## 2019-09-24 VITALS — WEIGHT: 237 LBS | OXYGEN SATURATION: 97 % | BODY MASS INDEX: 28.86 KG/M2 | HEIGHT: 76 IN | HEART RATE: 92 BPM

## 2019-09-24 DIAGNOSIS — E29.1 HYPOGONADISM IN MALE: Primary | ICD-10-CM

## 2019-09-24 DIAGNOSIS — Z51.81 ENCOUNTER FOR MONITORING TESTOSTERONE REPLACEMENT THERAPY: ICD-10-CM

## 2019-09-24 DIAGNOSIS — Z79.890 ENCOUNTER FOR MONITORING TESTOSTERONE REPLACEMENT THERAPY: ICD-10-CM

## 2019-09-24 LAB
BILIRUB UR QL STRIP: NEGATIVE
GLUCOSE UR-MCNC: NEGATIVE MG/DL
KETONES P FAST UR STRIP-MCNC: NEGATIVE MG/DL
PH UR STRIP: 6 [PH] (ref 4.6–8)
PROT UR QL STRIP: NEGATIVE
SP GR UR STRIP: 1.02 (ref 1–1.03)
UA UROBILINOGEN AMB POC: NORMAL (ref 0.2–1)
URINALYSIS CLARITY POC: CLEAR
URINALYSIS COLOR POC: YELLOW
URINE BLOOD POC: NORMAL
URINE LEUKOCYTES POC: NEGATIVE
URINE NITRITES POC: NEGATIVE

## 2019-09-25 NOTE — PROGRESS NOTES
[facility] 48 y.o. male     Mr. Jesus Lao seen today for scheduled semiannual follow-up hypogonadism currently on testosterone replacement therapy by IM injection of testosterone 20 mg every 3 weeks     Patient reports sustained benefit in any good strength, stamina, libido, and erectile function-overall marked improvement in sense of well-being     hypogonadism diagnosed and previously treated with application of testosterone gel to skin without appreciable therapeutic benefit   referred to urology for consideration of testosterone replacement therapy by injection of testosterone intramuscularly  Patient has no history of  tract disease, trauma, or surgery   Patient has easy fatigability, lethargy, erectile dysfunction and poor libido          Testosterone 144 in October 2017  PSA 0.4 in October 2017       Testosterone function panel 5 December 2017:  Testosterone 49  FSH 1.2  LH 3.4  Prolactin 10.4      PSA 0.5 on 4 December 2017  Hematocrit 44.7  \"     PSA 0.5  .7 March 2019:  Hematocrit 46.3  Testosterone 416          Review of Systems:   CNS: No seizure headaches dizziness visual changes  Respiratory: No wheezing no shortness of breath no chest pain  Cardiovascular: No palpitations no angina  Intestinal: GERD  Urinary: No urgency frequency dysuria or hematuria  Skeletal: Chronic low back pain  Endocrine: No bone or joint pain  Other:                                                                                       of lumber hauling service         Allergies: No Known Allergies   Medications:    Current Outpatient Medications   Medication Sig Dispense Refill    omeprazole (PRILOSEC) 20 mg capsule take 1 capsule by mouth   Cap 3    triamcinolone (NASACORT AQ) 55 mcg nasal inhaler 2 Sprays by Both Nostrils route daily as needed (allergic rhintis).  3 Bottle 2    testosterone cypionate (DEPOTESTOTERONE CYPIONATE) 200 mg/mL injection 200 mg by IntraMUSCular route every twenty-one (21) days.      ibuprofen (MOTRIN) 800 mg tablet Take 1 Tab by mouth every eight (8) hours as needed for Pain. 90 Tab 2    varenicline (CHANTIX STARTER MARGA) 0.5 mg (11)- 1 mg (42) DsPk Per Dose pack instructions 1 Dose Pack 0    varenicline (CHANTIX) 1 mg tablet Take 1 Tab by mouth two (2) times a day. 60 Tab 2    zolpidem (AMBIEN) 10 mg tablet Take 1 Tab by mouth nightly as needed for Sleep. Max Daily Amount: 10 mg. 30 Tab 3    cyclobenzaprine (FLEXERIL) 10 mg tablet Take 1 Tab by mouth nightly. 30 Tab 1       Past Medical History:   Diagnosis Date    High cholesterol 9/13/2013    Sleep apnea       Past Surgical History:   Procedure Laterality Date    HX ORTHOPAEDIC      Broken Sturnum      Social History     Socioeconomic History    Marital status:      Spouse name: Not on file    Number of children: Not on file    Years of education: Not on file    Highest education level: Not on file   Occupational History    Not on file   Social Needs    Financial resource strain: Not on file    Food insecurity:     Worry: Not on file     Inability: Not on file    Transportation needs:     Medical: Not on file     Non-medical: Not on file   Tobacco Use    Smoking status: Current Every Day Smoker     Packs/day: 0.50     Years: 8.00     Pack years: 4.00    Smokeless tobacco: Never Used   Substance and Sexual Activity    Alcohol use: Yes     Alcohol/week: 0.0 standard drinks     Comment: Drinks heavily 2 times a week.     Drug use: Yes     Comment: cocaine powder    Sexual activity: Yes     Partners: Female     Birth control/protection: None   Lifestyle    Physical activity:     Days per week: Not on file     Minutes per session: Not on file    Stress: Not on file   Relationships    Social connections:     Talks on phone: Not on file     Gets together: Not on file     Attends [employer] service: Not on file     Active member of club or organization: Not on file     Attends meetings of clubs or organizations: Not on file     Relationship status: Not on file    Intimate partner violence:     Fear of current or ex partner: Not on file     Emotionally abused: Not on file     Physically abused: Not on file     Forced sexual activity: Not on file   Other Topics Concern    Not on file   Social History Narrative    Not on file      Family History   Problem Relation Age of Onset    Hypertension Mother     Stroke Mother     Diabetes Mother     Heart Disease Mother     Heart Disease Father         Physical Examination: Well-nourished trim and fit appearing adult male in no apparent distress    Prostate by WHITLEY is rounded, smooth, benign consistency and nontender-no induration no nodularity  No rectal masses induration or tenderness    Urinalysis: Negative dipstick/nitrite negative/heme-negative    Impression: Hypogonadism responding favorably to testosterone replacement therapy by IM injection of testosterone        Plan: Testosterone 200 mg IM every 3 weeks by spouse    Rx testosterone in oil 200 mg/cc dispense signify 1 cc IM every 3 weeks 4 vials 21-gauge needles #10 3 cc syringes #10 refill x2    PSA CBC today    RTC 6 months PSA WHITLEY    More than 1/2 of this 15 minute visit was spent in counselling and coordination of care, as described above. Brijesh Garcia MD  -electronically signed-    PLEASE NOTE:  This document has been produced using voice recognition software. Unrecognized errors in transcription may be present.

## 2019-10-08 ENCOUNTER — OFFICE VISIT (OUTPATIENT)
Dept: ORTHOPEDIC SURGERY | Age: 54
End: 2019-10-08

## 2019-10-08 VITALS
DIASTOLIC BLOOD PRESSURE: 92 MMHG | OXYGEN SATURATION: 100 % | SYSTOLIC BLOOD PRESSURE: 131 MMHG | RESPIRATION RATE: 18 BRPM | HEART RATE: 57 BPM | BODY MASS INDEX: 28.8 KG/M2 | TEMPERATURE: 98.6 F | WEIGHT: 236.6 LBS

## 2019-10-08 DIAGNOSIS — M54.2 NECK PAIN: Primary | ICD-10-CM

## 2019-10-08 DIAGNOSIS — M54.12 CERVICAL RADICULOPATHY: ICD-10-CM

## 2019-10-08 RX ORDER — GABAPENTIN 300 MG/1
300 CAPSULE ORAL
Qty: 30 CAP | Refills: 1 | Status: SHIPPED | OUTPATIENT
Start: 2019-10-08 | End: 2020-11-13

## 2019-10-08 RX ORDER — METHYLPREDNISOLONE 4 MG/1
TABLET ORAL
Qty: 1 DOSE PACK | Refills: 0 | Status: SHIPPED | OUTPATIENT
Start: 2019-10-08 | End: 2020-11-13 | Stop reason: ALTCHOICE

## 2019-10-08 NOTE — PROGRESS NOTES
Chief complaint/History of Present Illness:  Chief Complaint   Patient presents with    Neck Pain     NEW PATIENT     BRIANNA Cain is a  48 y.o.  male      HISTORY OF PRESENT ILLNESS:  The patient comes in today as a new patient referred by Dr. Corinna Romero. He is complaining of right neck pain. It is a burning pain that radiates to his right shoulder and down to his elbow, sometimes to the mid-forearm. The arm pain is an aching, throbbing pain. It has been going on for a month. He does not remember any injury or excessive activity that has caused it. He has had a right shoulder injury back in 2017 where he picked up a steel weight and slung it. That tore his subscapular tendon. He saw Dr. Kathy Frank for that. Dr. Kathy Frank wanted him to have surgery; however, the patient did not want to have surgery, and it had healed up on its own. He states this pain that radiates to his right shoulder is not the same pain as the injury that he had back then. He has been taking Motrin 800 mg once a day. It is not helping at all. He states he tried working out at [employer], but found he had to put more effort into using his right arm than the left. He is right-handed. He denies dropping things or having any dexterity issues. He states his balance is off from time to time. He smokes one-half pack of cigarettes per day. He is an  having to lift equipment. Driving the equipment with the pull handles increases his pain. He last worked October 8, 2019, which would be this morning. He states he drinks two bourbons a day. He denies fever and bowel and bladder dysfunction. PAST MEDICAL HISTORY:  He has a past medical history of hyperlipidemia and sleep apnea. He also has hypogonadism and is currently on testosterone replacement therapy by Urology. PAST SURGICAL HISTORY:  Surgeries include a pair of broken sternum. PHYSICAL EXAM:  Mr. Debra Rudolph is a 53-year-old male.   He is alert and oriented. He has a normal mood and affect. He has a full weightbearing, non-antalgic gait using no assistive device. He has 5/5 strength of the bilateral upper extremities and negative Morrows. He has a normal tandem gait. He has full range of motion of his shoulders. ASSESSMENT/PLAN:  This is a patient who is having neck pain that radiates to the right shoulder down to the elbow and sometimes to the forearm in a radicular pattern. We did cervical AP and lateral x-rays. These will be read by Dr. Amy Hawley. I am going to give him a Medrol Dosepak. He knows not to take the Ibuprofen while he is on it, and to take it with food, but once he finishes the Medrol Dosepak, he can resume the Ibuprofen. He has a script by Dr. Vear Buerger. I am also going to start him on Neurontin 300 mg at bedtime. We are going to put him in some physical therapy. We will see him back in 6-8 weeks. If he is not improved, we will get an MRI of the cervical spine. Review of systems:    Past Medical History:   Diagnosis Date    High cholesterol 9/13/2013    Sleep apnea      Past Surgical History:   Procedure Laterality Date    HX ORTHOPAEDIC      Broken Sturnum      Social History     Socioeconomic History    Marital status:      Spouse name: Not on file    Number of children: Not on file    Years of education: Not on file    Highest education level: Not on file   Occupational History    Not on file   Social Needs    Financial resource strain: Not on file    Food insecurity:     Worry: Not on file     Inability: Not on file    Transportation needs:     Medical: Not on file     Non-medical: Not on file   Tobacco Use    Smoking status: Current Every Day Smoker     Packs/day: 0.50     Years: 8.00     Pack years: 4.00    Smokeless tobacco: Never Used   Substance and Sexual Activity    Alcohol use: Yes     Alcohol/week: 0.0 standard drinks     Comment: Drinks heavily 2 times a week.  Drug use:  Yes Comment: cocaine powder    Sexual activity: Yes     Partners: Female     Birth control/protection: None   Lifestyle    Physical activity:     Days per week: Not on file     Minutes per session: Not on file    Stress: Not on file   Relationships    Social connections:     Talks on phone: Not on file     Gets together: Not on file     Attends [employer] service: Not on file     Active member of club or organization: Not on file     Attends meetings of clubs or organizations: Not on file     Relationship status: Not on file    Intimate partner violence:     Fear of current or ex partner: Not on file     Emotionally abused: Not on file     Physically abused: Not on file     Forced sexual activity: Not on file   Other Topics Concern    Not on file   Social History Narrative    Not on file     Family History   Problem Relation Age of Onset    Hypertension Mother     Stroke Mother     Diabetes Mother     Heart Disease Mother     Heart Disease Father        Physical Exam:  Visit Vitals  BP (!) 131/92 (BP 1 Location: Left arm, BP Patient Position: Sitting)   Pulse (!) 57   Temp 98.6 °F (37 °C) (Oral)   Resp 18   Wt 236 lb 9.6 oz (107.3 kg)   SpO2 100%   BMI 28.80 kg/m²     Pain Scale: 8/10         has been . reviewed and is appropriate          Diagnoses and all orders for this visit:    1. Neck pain  -     AMB POC XRAY, SPINE, CERVICAL; 2 OR 3  -     methylPREDNISolone (MEDROL, MARGA,) 4 mg tablet; Per dose pack instructions  -     gabapentin (NEURONTIN) 300 mg capsule; Take 1 Cap by mouth nightly. Max Daily Amount: 300 mg. Indications: Neuropathic Pain  -     REFERRAL TO PHYSICAL THERAPY    2. Cervical radiculopathy  -     methylPREDNISolone (MEDROL, MARGA,) 4 mg tablet; Per dose pack instructions  -     gabapentin (NEURONTIN) 300 mg capsule; Take 1 Cap by mouth nightly. Max Daily Amount: 300 mg.  Indications: Neuropathic Pain  -     REFERRAL TO PHYSICAL THERAPY            Follow-up and Dispositions    · Return in about 2 months (around 12/8/2019) for with NP.              We have informed Fredy Glad to notify us for immediate appointment if he has any worsening neurogical symptoms or if an emergency situation presents, then call [phone]

## 2020-03-29 DIAGNOSIS — K21.9 GASTROESOPHAGEAL REFLUX DISEASE WITHOUT ESOPHAGITIS: ICD-10-CM

## 2020-03-29 RX ORDER — OMEPRAZOLE 20 MG/1
CAPSULE, DELAYED RELEASE ORAL
Qty: 180 CAP | Refills: 3 | Status: SHIPPED | OUTPATIENT
Start: 2020-03-29 | End: 2021-05-14

## 2020-11-03 ENCOUNTER — HOSPITAL ENCOUNTER (OUTPATIENT)
Dept: LAB | Age: 55
Discharge: HOME | End: 2020-11-03
Payer: COMMERCIAL

## 2020-11-03 ENCOUNTER — APPOINTMENT (OUTPATIENT)
Dept: INTERNAL MEDICINE CLINIC | Age: 55
End: 2020-11-03

## 2020-11-03 LAB
ALBUMIN SERPL-MCNC: 3.9 G/DL (ref 3.4–5)
ALBUMIN/GLOB SERPL: 1.3 {RATIO} (ref 0.8–1.7)
ALP SERPL-CCNC: 46 U/L (ref 45–117)
ALT SERPL-CCNC: 31 U/L (ref 16–61)
ANION GAP SERPL CALC-SCNC: 5 MMOL/L (ref 3–18)
AST SERPL-CCNC: 20 U/L (ref 10–38)
BASOPHILS # BLD: 0.1 K/UL (ref 0–0.1)
BASOPHILS NFR BLD: 1 % (ref 0–2)
BILIRUB SERPL-MCNC: 0.5 MG/DL (ref 0.2–1)
BUN SERPL-MCNC: 15 MG/DL (ref 7–18)
BUN/CREAT SERPL: 17 (ref 12–20)
CALCIUM SERPL-MCNC: 9 MG/DL (ref 8.5–10.1)
CHLORIDE SERPL-SCNC: 108 MMOL/L (ref 100–111)
CHOLEST SERPL-MCNC: 210 MG/DL
CO2 SERPL-SCNC: 29 MMOL/L (ref 21–32)
CREAT SERPL-MCNC: 0.88 MG/DL (ref 0.6–1.3)
DIFFERENTIAL METHOD BLD: ABNORMAL
EOSINOPHIL # BLD: 0.2 K/UL (ref 0–0.4)
EOSINOPHIL NFR BLD: 2 % (ref 0–5)
ERYTHROCYTE [DISTWIDTH] IN BLOOD BY AUTOMATED COUNT: 12.6 % (ref 11.6–14.5)
GLOBULIN SER CALC-MCNC: 2.9 G/DL (ref 2–4)
GLUCOSE SERPL-MCNC: 82 MG/DL (ref 74–99)
HCT VFR BLD AUTO: 40.4 % (ref 36–48)
HDLC SERPL-MCNC: 71 MG/DL (ref 40–60)
HDLC SERPL: 3 {RATIO} (ref 0–5)
HGB BLD-MCNC: 13.6 G/DL (ref 13–16)
LDLC SERPL CALC-MCNC: 121.6 MG/DL (ref 0–100)
LIPID PROFILE,FLP: ABNORMAL
LYMPHOCYTES # BLD: 1.8 K/UL (ref 0.9–3.6)
LYMPHOCYTES NFR BLD: 24 % (ref 21–52)
MCH RBC QN AUTO: 31.9 PG (ref 24–34)
MCHC RBC AUTO-ENTMCNC: 33.7 G/DL (ref 31–37)
MCV RBC AUTO: 94.8 FL (ref 74–97)
MONOCYTES # BLD: 0.6 K/UL (ref 0.05–1.2)
MONOCYTES NFR BLD: 7 % (ref 3–10)
NEUTS SEG # BLD: 5 K/UL (ref 1.8–8)
NEUTS SEG NFR BLD: 66 % (ref 40–73)
PLATELET # BLD AUTO: 250 K/UL (ref 135–420)
PMV BLD AUTO: 10.8 FL (ref 9.2–11.8)
POTASSIUM SERPL-SCNC: 4.5 MMOL/L (ref 3.5–5.5)
PROT SERPL-MCNC: 6.8 G/DL (ref 6.4–8.2)
PSA SERPL-MCNC: 0.4 NG/ML (ref 0–4)
RBC # BLD AUTO: 4.26 M/UL (ref 4.7–5.5)
SODIUM SERPL-SCNC: 142 MMOL/L (ref 136–145)
TRIGL SERPL-MCNC: 87 MG/DL (ref ?–150)
VLDLC SERPL CALC-MCNC: 17.4 MG/DL
WBC # BLD AUTO: 7.7 K/UL (ref 4.6–13.2)

## 2020-11-03 PROCEDURE — 80061 LIPID PANEL: CPT

## 2020-11-03 PROCEDURE — 36415 COLL VENOUS BLD VENIPUNCTURE: CPT

## 2020-11-03 PROCEDURE — 85025 COMPLETE CBC W/AUTO DIFF WBC: CPT

## 2020-11-03 PROCEDURE — 84153 ASSAY OF PSA TOTAL: CPT

## 2020-11-03 PROCEDURE — 80053 COMPREHEN METABOLIC PANEL: CPT

## 2020-11-13 ENCOUNTER — OFFICE VISIT (OUTPATIENT)
Dept: INTERNAL MEDICINE CLINIC | Age: 55
End: 2020-11-13
Payer: COMMERCIAL

## 2020-11-13 VITALS
HEART RATE: 77 BPM | RESPIRATION RATE: 20 BRPM | HEIGHT: 76 IN | BODY MASS INDEX: 30.56 KG/M2 | TEMPERATURE: 97.8 F | SYSTOLIC BLOOD PRESSURE: 135 MMHG | DIASTOLIC BLOOD PRESSURE: 87 MMHG | WEIGHT: 251 LBS | OXYGEN SATURATION: 98 %

## 2020-11-13 DIAGNOSIS — Z00.00 PHYSICAL EXAM: Primary | ICD-10-CM

## 2020-11-13 DIAGNOSIS — Z12.11 COLON CANCER SCREENING: ICD-10-CM

## 2020-11-13 DIAGNOSIS — E29.1 HYPOGONADISM MALE: ICD-10-CM

## 2020-11-13 DIAGNOSIS — F51.01 PRIMARY INSOMNIA: ICD-10-CM

## 2020-11-13 DIAGNOSIS — R03.0 TRANSIENT ELEVATED BLOOD PRESSURE: ICD-10-CM

## 2020-11-13 DIAGNOSIS — F17.200 TOBACCO DEPENDENCE: ICD-10-CM

## 2020-11-13 PROCEDURE — 99396 PREV VISIT EST AGE 40-64: CPT | Performed by: INTERNAL MEDICINE

## 2020-11-13 RX ORDER — CLONIDINE HYDROCHLORIDE 0.1 MG/1
0.1 TABLET ORAL
Qty: 90 TAB | Refills: 3 | Status: SHIPPED | OUTPATIENT
Start: 2020-11-13 | End: 2021-02-15

## 2020-11-13 NOTE — PROGRESS NOTES
Patient is in the office today for a physical.  Patient states he needs to get a prescription for sleep, but Ambien 10 mg was not working. 1. Have you been to the ER, urgent care clinic since your last visit? Hospitalized since your last visit? No    2. Have you seen or consulted any other health care providers outside of the [address] [place] [address] since your last visit? Include any pap smears or colon screening.  No

## 2020-11-13 NOTE — PATIENT INSTRUCTIONS
A Healthy Lifestyle: Care Instructions  Your Care Instructions     A healthy lifestyle can help you feel good, stay at a healthy weight, and have plenty of energy for both work and play. A healthy lifestyle is something you can share with your whole family. A healthy lifestyle also can lower your risk for serious health problems, such as high blood pressure, heart disease, and diabetes. You can follow a few steps listed below to improve your health and the health of your family. Follow-up care is a key part of your treatment and safety. Be sure to make and go to all appointments, and call your doctor if you are having problems. It's also a good idea to know your test results and keep a list of the medicines you take. How can you care for yourself at home? · Do not eat too much sugar, fat, or fast foods. You can still have dessert and treats now and then. The goal is moderation. · Start small to improve your eating habits. Pay attention to portion sizes, drink less juice and soda pop, and eat more fruits and vegetables. ? Eat a healthy amount of food. A 3-ounce serving of meat, for example, is about the size of a deck of cards. Fill the rest of your plate with vegetables and whole grains. ? Limit the amount of soda and sports drinks you have every day. Drink more water when you are thirsty. ? Eat at least 5 servings of fruits and vegetables every day. It may seem like a lot, but it is not hard to reach this goal. A serving or helping is 1 piece of fruit, 1 cup of vegetables, or 2 cups of leafy, raw vegetables. Have an apple or some carrot sticks as an afternoon snack instead of a candy bar. Try to have fruits and/or vegetables at every meal.  · Make exercise part of your daily routine. You may want to start with simple activities, such as walking, bicycling, or slow swimming. Try to be active 30 to 60 minutes every day. You do not need to do all 30 to 60 minutes all at once.  For example, you can exercise 3 times a day for 10 or 20 minutes. Moderate exercise is safe for most people, but it is always a good idea to talk to your doctor before starting an exercise program.  · Keep moving. Edna Pazles the lawn, work in the garden, or [place]. Take the stairs instead of the elevator at work. · If you smoke, quit. People who smoke have an increased risk for heart attack, stroke, cancer, and other lung illnesses. Quitting is hard, but there are ways to boost your chance of quitting tobacco for good. ? Use nicotine gum, patches, or lozenges. ? Ask your doctor about stop-smoking programs and medicines. ? Keep trying. In addition to reducing your risk of diseases in the future, you will notice some benefits soon after you stop using tobacco. If you have shortness of breath or asthma symptoms, they will likely get better within a few weeks after you quit. · Limit how much alcohol you drink. Moderate amounts of alcohol (up to 2 drinks a day for men, 1 drink a day for women) are okay. But drinking too much can lead to liver problems, high blood pressure, and other health problems. Family health  If you have a family, there are many things you can do together to improve your health. · Eat meals together as a family as often as possible. · Eat healthy foods. This includes fruits, vegetables, lean meats and dairy, and whole grains. · Include your family in your fitness plan. Most people think of activities such as jogging or tennis as the way to fitness, but there are many ways you and your family can be more active. Anything that makes you breathe hard and gets your heart pumping is exercise. Here are some tips:  ? Walk to do errands or to take your child to school or the bus.  ? Go for a family bike ride after dinner instead of watching TV. Where can you learn more?   Go to http://www.gray.com/  Enter N938 in the search box to learn more about \"A Healthy Lifestyle: Care Instructions. \"  Current as of: January 31, 2020               Content Version: 12.6  © 0187-3921 [employer]. Care instructions adapted under license by [employer] (which disclaims liability or warranty for this information). If you have questions about a medical condition or this instruction, always ask your healthcare professional. [place] [address] any warranty or liability for your use of this information.

## 2020-11-13 NOTE — PROGRESS NOTES
.    Subjective:   Renetta Walter is a 50 y.o. male presenting for his annual checkup. ROS:  Feeling well. No dyspnea or chest pain on exertion. No abdominal pain, change in bowel habits, black or bloody stools. No urinary tract or prostatic symptoms. No neurological complaints. Specific concerns today: pt was feeling better on Testosterone injections with urology but has been off of them since Dr Smitha Landry left over a year ago. He has regained weight and his cholesterol is now elevated. He continues to struggle with ETOH use and insomnia for which Ambien did not work. He tried his son clonidine with improvement with sleep. pt also says his BP has been elevated but it is better this AM after he took clonidine last night. He continues to use nasal steroids for his allergies. He struggles to stop tobacco use and ETOH use. He has been prescribed Chantix in the past. His GERD is well controlled on Prilosec 20 mg BID     Patient Active Problem List   Diagnosis Code    Fatigue R53.83    AR (allergic rhinitis) J30.9    High cholesterol E78.00    Hypogonadism male E29.1    Back pain, chronic M54.9, G89.29    Tobacco dependence F17.200    Sleep apnea G47.30    Gastroesophageal reflux disease without esophagitis K21.9     Current Outpatient Medications   Medication Sig Dispense Refill    cloNIDine HCL (CATAPRES) 0.1 mg tablet Take 1 Tab by mouth nightly. 90 Tab 3    omeprazole (PRILOSEC) 20 mg capsule take 1 capsule by mouth twice a day 180 Cap 3    ibuprofen (MOTRIN) 800 mg tablet Take 1 Tab by mouth every eight (8) hours as needed for Pain. 90 Tab 2    triamcinolone (NASACORT AQ) 55 mcg nasal inhaler 2 Sprays by Both Nostrils route daily as needed (allergic rhintis). 3 Bottle 2    testosterone cypionate (DEPOTESTOTERONE CYPIONATE) 200 mg/mL injection 200 mg by IntraMUSCular route every twenty-one (21) days.       varenicline (CHANTIX STARTER MARGA) 0.5 mg (11)- 1 mg (42) DsPk Per Dose pack instructions 1 Dose Pack 0    varenicline (CHANTIX) 1 mg tablet Take 1 Tab by mouth two (2) times a day. 60 Tab 2        Lab Results   Component Value Date/Time    WBC 7.7 11/03/2020 10:02 AM    HGB 13.6 11/03/2020 10:02 AM    HCT 40.4 11/03/2020 10:02 AM    PLATELET 214 77/62/8016 10:02 AM    MCV 94.8 11/03/2020 10:02 AM     Lab Results   Component Value Date/Time    Cholesterol, total 210 (H) 11/03/2020 10:02 AM    HDL Cholesterol 71 (H) 11/03/2020 10:02 AM    LDL, calculated 121.6 (H) 11/03/2020 10:02 AM    Triglyceride 87 11/03/2020 10:02 AM    CHOL/HDL Ratio 3.0 11/03/2020 10:02 AM     Lab Results   Component Value Date/Time    Prostate Specific Ag 0.4 11/03/2020 10:02 AM    Prostate Specific Ag 0.5 03/07/2019 07:41 AM    Prostate Specific Ag 0.6 08/29/2018 01:38 PM    Prostate Specific Ag 0.4 11/03/2015 08:43 AM    Prostate Specific Ag 0.3 10/28/2014 08:09 AM    Prostate Specific Ag 0.4 10/22/2013 08:35 AM     Lab Results   Component Value Date/Time    Sodium 142 11/03/2020 10:02 AM    Potassium 4.5 11/03/2020 10:02 AM    Chloride 108 11/03/2020 10:02 AM    CO2 29 11/03/2020 10:02 AM    Anion gap 5 11/03/2020 10:02 AM    Glucose 82 11/03/2020 10:02 AM    BUN 15 11/03/2020 10:02 AM    Creatinine 0.88 11/03/2020 10:02 AM    BUN/Creatinine ratio 17 11/03/2020 10:02 AM    GFR est AA >60 11/03/2020 10:02 AM    GFR est non-AA >60 11/03/2020 10:02 AM    Calcium 9.0 11/03/2020 10:02 AM    Bilirubin, total 0.5 11/03/2020 10:02 AM    ALT (SGPT) 31 11/03/2020 10:02 AM    Alk.  phosphatase 46 11/03/2020 10:02 AM    Protein, total 6.8 11/03/2020 10:02 AM    Albumin 3.9 11/03/2020 10:02 AM    Globulin 2.9 11/03/2020 10:02 AM    A-G Ratio 1.3 11/03/2020 10:02 AM         Objective:     Visit Vitals  /87 (BP 1 Location: Left arm, BP Patient Position: Sitting)   Pulse 77   Temp 97.8 °F (36.6 °C) (Temporal)   Resp 20   Ht 6' 4\" (1.93 m)   Wt 251 lb (113.9 kg)   SpO2 98%   BMI 30.55 kg/m²     The patient appears well, alert, oriented x 3, in no distress. ENT impacted cerumen in both ear canals  Neck supple. No adenopathy or thyromegaly. SANTINO. Lungs are clear, good air entry, no wheezes, rhonchi or rales. S1 and S2 normal, no murmurs, regular rate and rhythm. Abdomen is soft without tenderness, guarding, mass or organomegaly. Umbilical hernia non tender to palpation. Extremities show no edema, normal peripheral pulses. Neurological is normal without focal findings. Assessment/Plan:   healthy adult male  lose weight, increase physical activity, call if any problems. ICD-10-CM ICD-9-CM    1. Physical exam  Z00.00 V70.9    2. Primary insomnia  F51.01 307.42 Will try cloNIDine HCL (CATAPRES) 0.1 mg tablet QHS    3. Colon cancer screening  Z12.11 V76.51 REFERRAL TO GASTROENTEROLOGY   4. Hypogonadism male  E29.1 257.2 REFERRAL TO UROLOGY   5. Tobacco dependence  F17.200 305.1 Pt to consider CHantix    6. Transient elevated blood pressure  R03.0 796.2 cloNIDine HCL (CATAPRES) 0.1 mg tablet for now and continue to monitor        Follow-up and Dispositions    · Return in about 3 months (around 2/13/2021) for BP check. Reviewed plan of care. Patient has provided input and agrees with goals.

## 2020-12-17 RX ORDER — CYCLOBENZAPRINE HCL 10 MG
10 TABLET ORAL
Qty: 30 TAB | Refills: 2 | Status: SHIPPED | OUTPATIENT
Start: 2020-12-17 | End: 2021-03-16

## 2020-12-17 NOTE — TELEPHONE ENCOUNTER
Last Visit: 11/13/20 with MD Curtis  Next Appointment: 2/15/21 with MD Curtis  Previous Refill Encounter(s): 11/8/17 #30 with 1 refill    Requested Prescriptions     Pending Prescriptions Disp Refills    cyclobenzaprine (FLEXERIL) 10 mg tablet 30 Tab 1     Sig: Take 1 Tab by mouth nightly.

## 2021-02-15 ENCOUNTER — OFFICE VISIT (OUTPATIENT)
Dept: INTERNAL MEDICINE CLINIC | Age: 56
End: 2021-02-15
Payer: COMMERCIAL

## 2021-02-15 VITALS
RESPIRATION RATE: 20 BRPM | HEIGHT: 76 IN | OXYGEN SATURATION: 95 % | TEMPERATURE: 98.1 F | WEIGHT: 246 LBS | SYSTOLIC BLOOD PRESSURE: 110 MMHG | HEART RATE: 118 BPM | BODY MASS INDEX: 29.96 KG/M2 | DIASTOLIC BLOOD PRESSURE: 81 MMHG

## 2021-02-15 DIAGNOSIS — F41.9 ANXIETY: Primary | ICD-10-CM

## 2021-02-15 DIAGNOSIS — R20.2 NUMBNESS AND TINGLING OF RIGHT ARM: ICD-10-CM

## 2021-02-15 DIAGNOSIS — R20.0 NUMBNESS OF RIGHT HAND: ICD-10-CM

## 2021-02-15 DIAGNOSIS — R20.0 NUMBNESS AND TINGLING OF RIGHT ARM: ICD-10-CM

## 2021-02-15 DIAGNOSIS — R00.0 TACHYCARDIA: ICD-10-CM

## 2021-02-15 PROCEDURE — 99213 OFFICE O/P EST LOW 20 MIN: CPT | Performed by: INTERNAL MEDICINE

## 2021-02-15 RX ORDER — FLUOXETINE HYDROCHLORIDE 20 MG/1
20 CAPSULE ORAL DAILY
Qty: 30 CAP | Refills: 5 | Status: SHIPPED | OUTPATIENT
Start: 2021-02-15 | End: 2021-11-04

## 2021-02-15 NOTE — PATIENT INSTRUCTIONS
High Blood Pressure: Care Instructions  Overview     It's normal for blood pressure to go up and down throughout the day. But if it stays up, you have high blood pressure. Another name for high blood pressure is hypertension. Despite what a lot of people think, high blood pressure usually doesn't cause headaches or make you feel dizzy or lightheaded. It usually has no symptoms. But it does increase your risk of stroke, heart attack, and other problems. You and your doctor will talk about your risks of these problems based on your blood pressure. Your doctor will give you a goal for your blood pressure. Your goal will be based on your health and your age. Lifestyle changes, such as eating healthy and being active, are always important to help lower blood pressure. You might also take medicine to reach your blood pressure goal.  Follow-up care is a key part of your treatment and safety. Be sure to make and go to all appointments, and call your doctor if you are having problems. It's also a good idea to know your test results and keep a list of the medicines you take. How can you care for yourself at home? Medical treatment  · If you stop taking your medicine, your blood pressure will go back up. You may take one or more types of medicine to lower your blood pressure. Be safe with medicines. Take your medicine exactly as prescribed. Call your doctor if you think you are having a problem with your medicine. · Talk to your doctor before you start taking aspirin every day. Aspirin can help certain people lower their risk of a heart attack or stroke. But taking aspirin isn't right for everyone, because it can cause serious bleeding. · See your doctor regularly. You may need to see the doctor more often at first or until your blood pressure comes down. · If you are taking blood pressure medicine, talk to your doctor before you take decongestants or anti-inflammatory medicine, such as ibuprofen.  Some of these medicines can raise blood pressure. · Learn how to check your blood pressure at home. Lifestyle changes  · Stay at a healthy weight. This is especially important if you put on weight around the waist. Losing even 10 pounds can help you lower your blood pressure. · If your doctor recommends it, get more exercise. Walking is a good choice. Bit by bit, increase the amount you walk every day. Try for at least 30 minutes on most days of the week. You also may want to swim, bike, or do other activities. · Avoid or limit alcohol. Talk to your doctor about whether you can drink any alcohol. · Try to limit how much sodium you eat to less than 2,300 milligrams (mg) a day. Your doctor may ask you to try to eat less than 1,500 mg a day. · Eat plenty of fruits (such as bananas and oranges), vegetables, legumes, whole grains, and low-fat dairy products. · Lower the amount of saturated fat in your diet. Saturated fat is found in animal products such as milk, cheese, and meat. Limiting these foods may help you lose weight and also lower your risk for heart disease. · Do not smoke. Smoking increases your risk for heart attack and stroke. If you need help quitting, talk to your doctor about stop-smoking programs and medicines. These can increase your chances of quitting for good. When should you call for help? Call  911 anytime you think you may need emergency care. This may mean having symptoms that suggest that your blood pressure is causing a serious heart or blood vessel problem. Your blood pressure may be over 180/120. For example, call 911 if:    · You have symptoms of a heart attack. These may include:  ? Chest pain or pressure, or a strange feeling in the chest.  ? Sweating. ? Shortness of breath. ? Nausea or vomiting. ? Pain, pressure, or a strange feeling in the back, neck, jaw, or upper belly or in one or both shoulders or arms. ? Lightheadedness or sudden weakness.   ? A fast or irregular heartbeat.     · You have symptoms of a stroke. These may include:  ? Sudden numbness, tingling, weakness, or loss of movement in your face, arm, or leg, especially on only one side of your body. ? Sudden vision changes. ? Sudden trouble speaking. ? Sudden confusion or trouble understanding simple statements. ? Sudden problems with walking or balance. ? A sudden, severe headache that is different from past headaches.     · You have severe back or belly pain. Do not wait until your blood pressure comes down on its own. Get help right away. Call your doctor now or seek immediate care if:    · Your blood pressure is much higher than normal (such as 180/120 or higher), but you don't have symptoms.     · You think high blood pressure is causing symptoms, such as:  ? Severe headache.  ? Blurry vision. Watch closely for changes in your health, and be sure to contact your doctor if:    · Your blood pressure measures higher than your doctor recommends at least 2 times. That means the top number is higher or the bottom number is higher, or both.     · You think you may be having side effects from your blood pressure medicine. Where can you learn more? Go to http://www.gray.com/  Enter [id number] in the search box to learn more about \"High Blood Pressure: Care Instructions. \"  Current as of: December 16, 2019               Content Version: 12.6  © 1067-1967 [employer]. Care instructions adapted under license by [employer] (which disclaims liability or warranty for this information). If you have questions about a medical condition or this instruction, always ask your healthcare professional. [address] any warranty or liability for your use of this information.

## 2021-02-15 NOTE — PROGRESS NOTES
Patient is in the office today for a 3  month follow up. 1. Have you been to the ER, urgent care clinic since your last visit? Hospitalized since your last visit? No    2. Have you seen or consulted any other health care providers outside of the [address] [place] [address] since your last visit? Include any pap smears or colon screening.  No

## 2021-02-19 NOTE — PROGRESS NOTES
Whitney Freire is a 50 y.o.  male and presents with Blood Pressure Check, Hypertension, Palpitations, Hand Pain (right ), and Anxiety      SUBJECTIVE:    Anxiety  Patient complains of evaluation of anxiety disorder. He has the following anxiety symptoms: palpitations, paresthesias, insomnia, racing thoughts, psychomotor agitation. Onset of symptoms was approximately several months ago, gradually worsening since that time. He denies current suicidal and homicidal ideation. Family history significant for anxiety. Possible organic causes contributing are: none. Risk factors: negative life event patient onto a lot of stress in his personal life previous treatment includes none and no other therapies. He complains of the following side effects from the treatment: none. Patient's blood pressure has been elevated when he checks it at home but it is controlled here in the office. However he does have sinus tachycardia in the office. Patient will try and work on improving his diet and exercising but will treat anxiety as above and see if any improvement in overall blood pressure and tachycardia. Patient also complaining of numbness in his right hand and all which can be associated with neck discomfort. Patient will be referred to neurology to see exactly where his nerve is being entrapped. Respiratory ROS: negative for - shortness of breath  Cardiovascular ROS: negative for - chest pain    Current Outpatient Medications   Medication Sig    FLUoxetine (PROzac) 20 mg capsule Take 1 Cap by mouth daily.  testosterone cypionate (DEPOTESTOTERONE CYPIONATE) 200 mg/mL injection 1 mL by IntraMUSCular route Once every 2 weeks. Max Daily Amount: 200 mg.  cyclobenzaprine (FLEXERIL) 10 mg tablet Take 1 Tab by mouth nightly.  omeprazole (PRILOSEC) 20 mg capsule take 1 capsule by mouth twice a day    ibuprofen (MOTRIN) 800 mg tablet Take 1 Tab by mouth every eight (8) hours as needed for Pain.      No current facility-administered medications for this visit. OBJECTIVE:  alert, well appearing, and in no distress  Visit Vitals  /81 (BP 1 Location: Left upper arm, BP Patient Position: Sitting, BP Cuff Size: Adult)   Pulse (!) 118   Temp 98.1 °F (36.7 °C) (Temporal)   Resp 20   Ht 6' 4\" (1.93 m)   Wt 246 lb (111.6 kg)   SpO2 95%   BMI 29.94 kg/m²      well developed and well nourished  Chest - clear to auscultation, no wheezes, rales or rhonchi, symmetric air entry  Heart - tachycardia  Neurological -negative Tinel and Phalen sign bilaterally          Assessment/Plan      ICD-10-CM ICD-9-CM    1. Anxiety  F41.9 300.00 Will treat with FLUoxetine (PROzac) 20 mg capsule   2. Numbness of right hand  R20.0 782.0 REFERRAL TO NEUROLOGY   3. Numbness and tingling of right arm  R20.0 782.0 REFERRAL TO NEUROLOGY    R20.2     4. Tachycardia  R00.0 785.0 Will revaluate at next OV after treating anxiety      Follow-up and Dispositions    · Return in about 4 weeks (around 3/15/2021) for BP check, Anxiety. Reviewed plan of care. Patient has provided input and agrees with goals.

## 2021-03-08 ENCOUNTER — OFFICE VISIT (OUTPATIENT)
Dept: INTERNAL MEDICINE CLINIC | Age: 56
End: 2021-03-08
Payer: COMMERCIAL

## 2021-03-08 VITALS
RESPIRATION RATE: 20 BRPM | DIASTOLIC BLOOD PRESSURE: 98 MMHG | HEART RATE: 60 BPM | SYSTOLIC BLOOD PRESSURE: 144 MMHG | BODY MASS INDEX: 30.56 KG/M2 | HEIGHT: 76 IN | WEIGHT: 251 LBS | OXYGEN SATURATION: 98 % | TEMPERATURE: 97.1 F

## 2021-03-08 DIAGNOSIS — F51.01 PRIMARY INSOMNIA: ICD-10-CM

## 2021-03-08 DIAGNOSIS — Z12.11 COLON CANCER SCREENING: ICD-10-CM

## 2021-03-08 DIAGNOSIS — F41.9 ANXIETY: ICD-10-CM

## 2021-03-08 DIAGNOSIS — I10 ESSENTIAL HYPERTENSION: Primary | ICD-10-CM

## 2021-03-08 PROCEDURE — 99213 OFFICE O/P EST LOW 20 MIN: CPT | Performed by: INTERNAL MEDICINE

## 2021-03-08 RX ORDER — DOXEPIN HYDROCHLORIDE 50 MG/1
50 CAPSULE ORAL
Qty: 30 CAP | Refills: 3 | Status: SHIPPED | OUTPATIENT
Start: 2021-03-08 | End: 2021-11-04

## 2021-03-08 NOTE — PROGRESS NOTES
Patient is in the office today for a 4 week follow up. 1. Have you been to the ER, urgent care clinic since your last visit? Hospitalized since your last visit? No    2. Have you seen or consulted any other health care providers outside of the [address] [place] [address] since your last visit? Include any pap smears or colon screening.  No

## 2021-03-08 NOTE — PATIENT INSTRUCTIONS
High Blood Pressure: Care Instructions  Overview     It's normal for blood pressure to go up and down throughout the day. But if it stays up, you have high blood pressure. Another name for high blood pressure is hypertension. Despite what a lot of people think, high blood pressure usually doesn't cause headaches or make you feel dizzy or lightheaded. It usually has no symptoms. But it does increase your risk of stroke, heart attack, and other problems. You and your doctor will talk about your risks of these problems based on your blood pressure. Your doctor will give you a goal for your blood pressure. Your goal will be based on your health and your age. Lifestyle changes, such as eating healthy and being active, are always important to help lower blood pressure. You might also take medicine to reach your blood pressure goal.  Follow-up care is a key part of your treatment and safety. Be sure to make and go to all appointments, and call your doctor if you are having problems. It's also a good idea to know your test results and keep a list of the medicines you take. How can you care for yourself at home? Medical treatment  · If you stop taking your medicine, your blood pressure will go back up. You may take one or more types of medicine to lower your blood pressure. Be safe with medicines. Take your medicine exactly as prescribed. Call your doctor if you think you are having a problem with your medicine. · Talk to your doctor before you start taking aspirin every day. Aspirin can help certain people lower their risk of a heart attack or stroke. But taking aspirin isn't right for everyone, because it can cause serious bleeding. · See your doctor regularly. You may need to see the doctor more often at first or until your blood pressure comes down. · If you are taking blood pressure medicine, talk to your doctor before you take decongestants or anti-inflammatory medicine, such as ibuprofen.  Some of these medicines can raise blood pressure. · Learn how to check your blood pressure at home. Lifestyle changes  · Stay at a healthy weight. This is especially important if you put on weight around the waist. Losing even 10 pounds can help you lower your blood pressure. · If your doctor recommends it, get more exercise. Walking is a good choice. Bit by bit, increase the amount you walk every day. Try for at least 30 minutes on most days of the week. You also may want to swim, bike, or do other activities. · Avoid or limit alcohol. Talk to your doctor about whether you can drink any alcohol. · Try to limit how much sodium you eat to less than 2,300 milligrams (mg) a day. Your doctor may ask you to try to eat less than 1,500 mg a day. · Eat plenty of fruits (such as bananas and oranges), vegetables, legumes, whole grains, and low-fat dairy products. · Lower the amount of saturated fat in your diet. Saturated fat is found in animal products such as milk, cheese, and meat. Limiting these foods may help you lose weight and also lower your risk for heart disease. · Do not smoke. Smoking increases your risk for heart attack and stroke. If you need help quitting, talk to your doctor about stop-smoking programs and medicines. These can increase your chances of quitting for good. When should you call for help? Call  911 anytime you think you may need emergency care. This may mean having symptoms that suggest that your blood pressure is causing a serious heart or blood vessel problem. Your blood pressure may be over 180/120. For example, call 911 if:    · You have symptoms of a heart attack. These may include:  ? Chest pain or pressure, or a strange feeling in the chest.  ? Sweating. ? Shortness of breath. ? Nausea or vomiting. ? Pain, pressure, or a strange feeling in the back, neck, jaw, or upper belly or in one or both shoulders or arms. ? Lightheadedness or sudden weakness.   ? A fast or irregular heartbeat.     · You have symptoms of a stroke. These may include:  ? Sudden numbness, tingling, weakness, or loss of movement in your face, arm, or leg, especially on only one side of your body. ? Sudden vision changes. ? Sudden trouble speaking. ? Sudden confusion or trouble understanding simple statements. ? Sudden problems with walking or balance. ? A sudden, severe headache that is different from past headaches.     · You have severe back or belly pain. Do not wait until your blood pressure comes down on its own. Get help right away. Call your doctor now or seek immediate care if:    · Your blood pressure is much higher than normal (such as 180/120 or higher), but you don't have symptoms.     · You think high blood pressure is causing symptoms, such as:  ? Severe headache.  ? Blurry vision. Watch closely for changes in your health, and be sure to contact your doctor if:    · Your blood pressure measures higher than your doctor recommends at least 2 times. That means the top number is higher or the bottom number is higher, or both.     · You think you may be having side effects from your blood pressure medicine. Where can you learn more? Go to http://www.gray.com/  Enter [id number] in the search box to learn more about \"High Blood Pressure: Care Instructions. \"  Current as of: December 16, 2019               Content Version: 12.6  © 4717-5305 [employer]. Care instructions adapted under license by [employer] (which disclaims liability or warranty for this information). If you have questions about a medical condition or this instruction, always ask your healthcare professional. [address] any warranty or liability for your use of this information.

## 2021-03-13 NOTE — PROGRESS NOTES
Kirit Trevino is a 50 y.o.  male and presents with Blood Pressure Check, Anxiety (f/u ), Insomnia, and Hypertension (continue to monitor BP )      SUBJECTIVE:    Patient's anxiety has improved on Prozac 20 mg daily according to his wife. Patient continues to have difficulty sleeping which is probably exacerbated by his use of alcohol on a regular basis. His alcohol use is also probably exacerbating his blood pressure being elevated. He has been monitoring at home and it is still borderline controlled at home but better than it is here in the office. Patient will try for the next 3 months to cut back alcohol is much as possible, exercise and lose some weight if possible. If blood pressure not improving will need to start on blood pressure medicines. Patient has been on multiple medicines for his insomnia all of which have not worked optimally. Respiratory ROS: negative for - shortness of breath  Cardiovascular ROS: negative for - chest pain    Current Outpatient Medications   Medication Sig    doxepin (SINEquan) 50 mg capsule Take 1 Cap by mouth nightly. Indications: insomnia    FLUoxetine (PROzac) 20 mg capsule Take 1 Cap by mouth daily.  testosterone cypionate (DEPOTESTOTERONE CYPIONATE) 200 mg/mL injection 1 mL by IntraMUSCular route Once every 2 weeks. Max Daily Amount: 200 mg.  cyclobenzaprine (FLEXERIL) 10 mg tablet Take 1 Tab by mouth nightly.  omeprazole (PRILOSEC) 20 mg capsule take 1 capsule by mouth twice a day    ibuprofen (MOTRIN) 800 mg tablet Take 1 Tab by mouth every eight (8) hours as needed for Pain. No current facility-administered medications for this visit.           OBJECTIVE:  alert, well appearing, and in no distress  Visit Vitals  BP (!) 144/98 (BP 1 Location: Left arm, BP Patient Position: Sitting, BP Cuff Size: Adult)   Pulse 60   Temp 97.1 °F (36.2 °C) (Temporal)   Resp 20   Ht 6' 4\" (1.93 m)   Wt 251 lb (113.9 kg)   SpO2 98%   BMI 30.55 kg/m²      well developed and well nourished          Labs:   Lab Results   Component Value Date/Time    Cholesterol, total 210 (H) 11/03/2020 10:02 AM    HDL Cholesterol 71 (H) 11/03/2020 10:02 AM    LDL, calculated 121.6 (H) 11/03/2020 10:02 AM    Triglyceride 87 11/03/2020 10:02 AM    CHOL/HDL Ratio 3.0 11/03/2020 10:02 AM     Lab Results   Component Value Date/Time    Sodium 142 11/03/2020 10:02 AM    Potassium 4.5 11/03/2020 10:02 AM    Chloride 108 11/03/2020 10:02 AM    CO2 29 11/03/2020 10:02 AM    Anion gap 5 11/03/2020 10:02 AM    Glucose 82 11/03/2020 10:02 AM    BUN 15 11/03/2020 10:02 AM    Creatinine 0.88 11/03/2020 10:02 AM    BUN/Creatinine ratio 17 11/03/2020 10:02 AM    GFR est AA >60 11/03/2020 10:02 AM    GFR est non-AA >60 11/03/2020 10:02 AM    Calcium 9.0 11/03/2020 10:02 AM    Bilirubin, total 0.5 11/03/2020 10:02 AM    ALT (SGPT) 31 11/03/2020 10:02 AM    Alk. phosphatase 46 11/03/2020 10:02 AM    Protein, total 6.8 11/03/2020 10:02 AM    Albumin 3.9 11/03/2020 10:02 AM    Globulin 2.9 11/03/2020 10:02 AM    A-G Ratio 1.3 11/03/2020 10:02 AM        Discussed the patient's BMI with him. The BMI follow up plan is as follows: I have counseled this patient on diet and exercise regimens. Assessment/Plan      ICD-10-CM ICD-9-CM    1. Essential hypertension  I10 401.9  uncontrolled. Probably exacerbated by alcohol use. Patient will try over the next 3 months to decrease alcohol, exercise and watch diet and if not improving will need to start on blood pressure medicines   2. Anxiety  F41.9 300.00  improved on Prozac 20 mg daily   3. Primary insomnia  F51.01 307.42  difficult to treat due to history of alcohol use   4. Colon cancer screening  Z12.11 V76.51 COLOGUARD TEST (FECAL DNA COLORECTAL CANCER SCREENING)     Follow-up and Dispositions    · Return in about 3 months (around 6/8/2021) for BP check. Reviewed plan of care. Patient has provided input and agrees with goals.

## 2021-03-16 ENCOUNTER — HOSPITAL ENCOUNTER (EMERGENCY)
Age: 56
Discharge: HOME | End: 2021-03-16
Attending: EMERGENCY MEDICINE
Payer: COMMERCIAL

## 2021-03-16 ENCOUNTER — APPOINTMENT (OUTPATIENT)
Dept: GENERAL RADIOLOGY | Age: 56
End: 2021-03-16
Attending: PHYSICIAN ASSISTANT
Payer: COMMERCIAL

## 2021-03-16 VITALS
TEMPERATURE: 98.1 F | RESPIRATION RATE: 12 BRPM | HEIGHT: 77 IN | BODY MASS INDEX: 28.34 KG/M2 | SYSTOLIC BLOOD PRESSURE: 150 MMHG | OXYGEN SATURATION: 98 % | DIASTOLIC BLOOD PRESSURE: 93 MMHG | HEART RATE: 97 BPM | WEIGHT: 240 LBS

## 2021-03-16 DIAGNOSIS — R07.9 CHEST PAIN, UNSPECIFIED TYPE: Primary | ICD-10-CM

## 2021-03-16 DIAGNOSIS — R00.2 PALPITATIONS: ICD-10-CM

## 2021-03-16 LAB
ALBUMIN SERPL-MCNC: 3.8 G/DL (ref 3.4–5)
ALBUMIN/GLOB SERPL: 1.1 {RATIO} (ref 0.8–1.7)
ALP SERPL-CCNC: 58 U/L (ref 45–117)
ALT SERPL-CCNC: 103 U/L (ref 16–61)
ANION GAP SERPL CALC-SCNC: 9 MMOL/L (ref 3–18)
AST SERPL-CCNC: 74 U/L (ref 10–38)
ATRIAL RATE: 117 BPM
BASOPHILS # BLD: 0.1 K/UL (ref 0–0.1)
BASOPHILS NFR BLD: 1 % (ref 0–2)
BILIRUB SERPL-MCNC: 0.3 MG/DL (ref 0.2–1)
BNP SERPL-MCNC: 47 PG/ML (ref 0–900)
BUN SERPL-MCNC: 19 MG/DL (ref 7–18)
BUN/CREAT SERPL: 24 (ref 12–20)
CALCIUM SERPL-MCNC: 8.9 MG/DL (ref 8.5–10.1)
CALCULATED P AXIS, ECG09: 57 DEGREES
CALCULATED R AXIS, ECG10: 29 DEGREES
CALCULATED T AXIS, ECG11: 39 DEGREES
CHLORIDE SERPL-SCNC: 105 MMOL/L (ref 100–111)
CO2 SERPL-SCNC: 26 MMOL/L (ref 21–32)
CREAT SERPL-MCNC: 0.78 MG/DL (ref 0.6–1.3)
D DIMER PPP FEU-MCNC: 0.44 UG/ML(FEU)
DIAGNOSIS, 93000: NORMAL
DIFFERENTIAL METHOD BLD: ABNORMAL
EOSINOPHIL # BLD: 0.2 K/UL (ref 0–0.4)
EOSINOPHIL NFR BLD: 3 % (ref 0–5)
ERYTHROCYTE [DISTWIDTH] IN BLOOD BY AUTOMATED COUNT: 12.8 % (ref 11.6–14.5)
GLOBULIN SER CALC-MCNC: 3.5 G/DL (ref 2–4)
GLUCOSE SERPL-MCNC: 112 MG/DL (ref 74–99)
HCT VFR BLD AUTO: 45.1 % (ref 36–48)
HGB BLD-MCNC: 15.1 G/DL (ref 13–16)
LYMPHOCYTES # BLD: 1.8 K/UL (ref 0.9–3.6)
LYMPHOCYTES NFR BLD: 26 % (ref 21–52)
MCH RBC QN AUTO: 32.2 PG (ref 24–34)
MCHC RBC AUTO-ENTMCNC: 33.5 G/DL (ref 31–37)
MCV RBC AUTO: 96.2 FL (ref 74–97)
MONOCYTES # BLD: 0.7 K/UL (ref 0.05–1.2)
MONOCYTES NFR BLD: 9 % (ref 3–10)
NEUTS SEG # BLD: 4.3 K/UL (ref 1.8–8)
NEUTS SEG NFR BLD: 61 % (ref 40–73)
P-R INTERVAL, ECG05: 150 MS
PLATELET # BLD AUTO: 203 K/UL (ref 135–420)
PMV BLD AUTO: 10.4 FL (ref 9.2–11.8)
POTASSIUM SERPL-SCNC: 4.2 MMOL/L (ref 3.5–5.5)
PROT SERPL-MCNC: 7.3 G/DL (ref 6.4–8.2)
Q-T INTERVAL, ECG07: 320 MS
QRS DURATION, ECG06: 100 MS
QTC CALCULATION (BEZET), ECG08: 446 MS
RBC # BLD AUTO: 4.69 M/UL (ref 4.7–5.5)
SODIUM SERPL-SCNC: 140 MMOL/L (ref 136–145)
TROPONIN I SERPL-MCNC: <0.02 NG/ML (ref 0–0.04)
TROPONIN I SERPL-MCNC: <0.02 NG/ML (ref 0–0.04)
TSH SERPL DL<=0.05 MIU/L-ACNC: 0.88 UIU/ML (ref 0.36–3.74)
VENTRICULAR RATE, ECG03: 117 BPM
WBC # BLD AUTO: 7 K/UL (ref 4.6–13.2)

## 2021-03-16 PROCEDURE — 83880 ASSAY OF NATRIURETIC PEPTIDE: CPT

## 2021-03-16 PROCEDURE — 85379 FIBRIN DEGRADATION QUANT: CPT

## 2021-03-16 PROCEDURE — 84484 ASSAY OF TROPONIN QUANT: CPT

## 2021-03-16 PROCEDURE — 74011250637 HC RX REV CODE- 250/637: Performed by: PHYSICIAN ASSISTANT

## 2021-03-16 PROCEDURE — 99285 EMERGENCY DEPT VISIT HI MDM: CPT

## 2021-03-16 PROCEDURE — 80053 COMPREHEN METABOLIC PANEL: CPT

## 2021-03-16 PROCEDURE — 84443 ASSAY THYROID STIM HORMONE: CPT

## 2021-03-16 PROCEDURE — 93005 ELECTROCARDIOGRAM TRACING: CPT

## 2021-03-16 PROCEDURE — 71045 X-RAY EXAM CHEST 1 VIEW: CPT

## 2021-03-16 PROCEDURE — 96360 HYDRATION IV INFUSION INIT: CPT

## 2021-03-16 PROCEDURE — 74011250636 HC RX REV CODE- 250/636: Performed by: PHYSICIAN ASSISTANT

## 2021-03-16 PROCEDURE — 85025 COMPLETE CBC W/AUTO DIFF WBC: CPT

## 2021-03-16 RX ORDER — ASPIRIN 325 MG
325 TABLET ORAL
Status: COMPLETED | OUTPATIENT
Start: 2021-03-16 | End: 2021-03-16

## 2021-03-16 RX ADMIN — SODIUM CHLORIDE 1000 ML: 900 INJECTION, SOLUTION INTRAVENOUS at 14:27

## 2021-03-16 RX ADMIN — ASPIRIN 325 MG ORAL TABLET 325 MG: 325 PILL ORAL at 14:27

## 2021-03-16 NOTE — ED PROVIDER NOTES
EMERGENCY DEPARTMENT HISTORY AND PHYSICAL EXAM    2:30 PM      Date: 3/16/2021  Patient Name: Kirit Trevino    History of Presenting Illness     Chief Complaint   Patient presents with    Chest Pain       History Provided By: Patient    Additional History (Context): Kirit Trevino is a 50 y.o. male with hx of HTN, anxiety, insomnia, and other PMH who presents with c/o sharp, left-sided chest pain and rapid heart rate since 12:30 pm today. Pt notes the symptoms started after eating, notes he took GasX and symptoms improved. Pt denies fever or chills, diaphoresis, dyspnea, cough, orthopnea, pleuritic or exertional symptoms, abdominal pain, nausea or vomiting, radiation of pain into neck or arm. Denies history of coronary artery disease, history of DVT or PE, hemoptysis, recent surgery or travel, leg swelling, hx of thyroid d/o. Patient notes he does smoke. PCP: Susanna Castro MD    Current Outpatient Medications   Medication Sig Dispense Refill    testosterone cypionate (DEPOTESTOTERONE CYPIONATE) 200 mg/mL injection 1 mL by IntraMUSCular route Once every 2 weeks. Max Daily Amount: 200 mg. 1 Vial 5    doxepin (SINEquan) 50 mg capsule Take 1 Cap by mouth nightly. Indications: insomnia 30 Cap 3    FLUoxetine (PROzac) 20 mg capsule Take 1 Cap by mouth daily. 30 Cap 5    omeprazole (PRILOSEC) 20 mg capsule take 1 capsule by mouth twice a day 180 Cap 3    ibuprofen (MOTRIN) 800 mg tablet Take 1 Tab by mouth every eight (8) hours as needed for Pain.  80 Tab 2       Past History     Past Medical History:  Past Medical History:   Diagnosis Date    Back pain, chronic     High cholesterol 9/13/2013    Sleep apnea        Past Surgical History:  Past Surgical History:   Procedure Laterality Date    HX ORTHOPAEDIC      Broken Sturnum        Family History:  Family History   Problem Relation Age of Onset    Hypertension Mother     Stroke Mother     Diabetes Mother     Heart Disease Mother     Heart Disease Father        Social History:  Social History     Tobacco Use    Smoking status: Current Every Day Smoker     Packs/day: 0.50     Years: 8.00     Pack years: 4.00    Smokeless tobacco: Never Used   Substance Use Topics    Alcohol use: Yes     Alcohol/week: 0.0 standard drinks     Comment: Drinks 1/2 gallon of liquor a week    Drug use: Yes     Comment: cocaine powder       Allergies:  No Known Allergies      Review of Systems       Review of Systems   Constitutional: Negative for chills and fever. Respiratory: Negative for shortness of breath. Cardiovascular: Positive for chest pain and palpitations. Negative for leg swelling. Gastrointestinal: Negative for abdominal pain, nausea and vomiting. Skin: Negative for rash. Neurological: Negative for weakness. All other systems reviewed and are negative. Physical Exam     Visit Vitals  BP (!) 150/93   Pulse 97   Temp 98.1 °F (36.7 °C)   Resp 12   Ht 6' 5\" (1.956 m)   Wt 108.9 kg (240 lb)   SpO2 98%   BMI 28.46 kg/m²         Physical Exam  Vitals signs and nursing note reviewed. Constitutional:       General: He is not in acute distress. Appearance: He is well-developed. He is not ill-appearing, toxic-appearing or diaphoretic. HENT:      Head: Normocephalic and atraumatic. Neck:      Musculoskeletal: Normal range of motion and neck supple. Cardiovascular:      Rate and Rhythm: Regular rhythm. Tachycardia present. Heart sounds: Normal heart sounds. No murmur. No friction rub. No gallop. Pulmonary:      Effort: Pulmonary effort is normal. No respiratory distress. Breath sounds: Normal breath sounds. No decreased breath sounds, wheezing or rales. Musculoskeletal: Normal range of motion. Skin:     General: Skin is warm. Findings: No rash. Neurological:      Mental Status: He is alert.            Diagnostic Study Results     Labs -  Recent Results (from the past 12 hour(s))   EKG, 12 LEAD, INITIAL    Collection Time: 03/16/21  2:09 PM   Result Value Ref Range    Ventricular Rate 117 BPM    Atrial Rate 117 BPM    P-R Interval 150 ms    QRS Duration 100 ms    Q-T Interval 320 ms    QTC Calculation (Bezet) 446 ms    Calculated P Axis 57 degrees    Calculated R Axis 29 degrees    Calculated T Axis 39 degrees    Diagnosis       Sinus tachycardia  Otherwise normal ECG  No previous ECGs available  Confirmed by Soco Gilmore MD, Candice Sousa ([id number]) on 3/16/2021 5:48:58 PM     CBC WITH AUTOMATED DIFF    Collection Time: 03/16/21  2:12 PM   Result Value Ref Range    WBC 7.0 4.6 - 13.2 K/uL    RBC 4.69 (L) 4.70 - 5.50 M/uL    HGB 15.1 13.0 - 16.0 g/dL    HCT 45.1 36.0 - 48.0 %    MCV 96.2 74.0 - 97.0 FL    MCH 32.2 24.0 - 34.0 PG    MCHC 33.5 31.0 - 37.0 g/dL    RDW 12.8 11.6 - 14.5 %    PLATELET 330 868 - 646 K/uL    MPV 10.4 9.2 - 11.8 FL    NEUTROPHILS 61 40 - 73 %    LYMPHOCYTES 26 21 - 52 %    MONOCYTES 9 3 - 10 %    EOSINOPHILS 3 0 - 5 %    BASOPHILS 1 0 - 2 %    ABS. NEUTROPHILS 4.3 1.8 - 8.0 K/UL    ABS. LYMPHOCYTES 1.8 0.9 - 3.6 K/UL    ABS. MONOCYTES 0.7 0.05 - 1.2 K/UL    ABS. EOSINOPHILS 0.2 0.0 - 0.4 K/UL    ABS. BASOPHILS 0.1 0.0 - 0.1 K/UL    DF AUTOMATED     METABOLIC PANEL, COMPREHENSIVE    Collection Time: 03/16/21  2:12 PM   Result Value Ref Range    Sodium 140 136 - 145 mmol/L    Potassium 4.2 3.5 - 5.5 mmol/L    Chloride 105 100 - 111 mmol/L    CO2 26 21 - 32 mmol/L    Anion gap 9 3.0 - 18 mmol/L    Glucose 112 (H) 74 - 99 mg/dL    BUN 19 (H) 7.0 - 18 MG/DL    Creatinine 0.78 0.6 - 1.3 MG/DL    BUN/Creatinine ratio 24 (H) 12 - 20      GFR est AA >60 >60 ml/min/1.73m2    GFR est non-AA >60 >60 ml/min/1.73m2    Calcium 8.9 8.5 - 10.1 MG/DL    Bilirubin, total 0.3 0.2 - 1.0 MG/DL    ALT (SGPT) 103 (H) 16 - 61 U/L    AST (SGOT) 74 (H) 10 - 38 U/L    Alk.  phosphatase 58 45 - 117 U/L    Protein, total 7.3 6.4 - 8.2 g/dL    Albumin 3.8 3.4 - 5.0 g/dL    Globulin 3.5 2.0 - 4.0 g/dL    A-G Ratio 1.1 0.8 - 1.7     TROPONIN I    Collection Time: 03/16/21  2:12 PM   Result Value Ref Range    Troponin-I, QT <0.02 0.0 - 0.045 NG/ML   TSH 3RD GENERATION    Collection Time: 03/16/21  2:12 PM   Result Value Ref Range    TSH 0.88 0.36 - 3.74 uIU/mL   NT-PRO BNP    Collection Time: 03/16/21  2:12 PM   Result Value Ref Range    NT pro-BNP 47 0 - 900 PG/ML   D DIMER    Collection Time: 03/16/21  2:12 PM   Result Value Ref Range    D DIMER 0.44 <0.46 ug/ml(FEU)   TROPONIN I    Collection Time: 03/16/21  5:18 PM   Result Value Ref Range    Troponin-I, QT <0.02 0.0 - 0.045 NG/ML       Radiologic Studies -   XR CHEST PORT   Final Result   Negative CXR            Medical Decision Making   I am the first provider for this patient. I reviewed the vital signs, available nursing notes, past medical history, past surgical history, family history and social history. Vital Signs-Reviewed the patient's vital signs. Pulse Oximetry Analysis -  96% on room air    Cardiac Monitor:  Rate: 90  Rhythm: normal sinus rhythm     EKG: Interpreted by the EP. Time Interpreted: 1410   Rate: 117   Rhythm: sinus tachycardia   Interpretation: no evidence of ischemia    Records Reviewed: Nursing Notes, Old Medical Records and Previous electrocardiograms (Time of Review: 2:30 PM)    ED Course: Progress Notes, Reevaluation, and Consults:  4:00 PM: Pt resting comfortably, denies any symptoms at this time. 6:47 PM Reviewed results with patient. Discussed need for close outpatient follow-up with cardiologist this week for reassessment. Discussed strict return precautions, including fever, chest pain, shortness of breath, or any other medical concerns. Patient in agreement with plan, ready to go home    Provider Notes (Medical Decision Making): 72-year-old male with history of hypertension and anxiety who presents to the ED due to left-sided chest pain and rapid heart rate. Afebrile, nontoxic-appearing, looks well, 98% on room air.   EKG demonstrates sinus tachycardia without evidence of ischemia, troponin negative x2. Heart score 3, low risk of MACE. Dimer within normal limits. Chest x-ray without acute process. Do not suspect PE, ACS, or aortic pathology. Patient notes symptoms resolved. Do not feel further labs or imaging are warranted. Stable for discharge with close outpatient follow-up for further assessment. Strict return precautions provided. Diagnosis     Clinical Impression:   1. Chest pain, unspecified type    2. Palpitations        Disposition: home    Follow-up Information     Follow up With Specialties Details [facility] Emergency Medicine  If symptoms worsen [address] Delmi Carranza MD Internal Medicine Schedule an appointment as soon as possible for a visit   [address] [facility]      Reyna Green MD Cardiology, Internal Medicine Schedule an appointment as soon as possible for a visit  cardiologist [address]  [phone]             Discharge Medication List as of 3/16/2021  6:25 PM      CONTINUE these medications which have NOT CHANGED    Details   doxepin (SINEquan) 50 mg capsule Take 1 Cap by mouth nightly. Indications: insomnia, Normal, Disp-30 Cap, R-3      FLUoxetine (PROzac) 20 mg capsule Take 1 Cap by mouth daily. , Normal, Disp-30 Cap, R-5      testosterone cypionate (DEPOTESTOTERONE CYPIONATE) 200 mg/mL injection 1 mL by IntraMUSCular route Once every 2 weeks. Max Daily Amount: 200 mg., Print, Disp-1 Vial, R-5      omeprazole (PRILOSEC) 20 mg capsule take 1 capsule by mouth twice a day, Normal, Disp-180 Cap,R-3      ibuprofen (MOTRIN) 800 mg tablet Take 1 Tab by mouth every eight (8) hours as needed for Pain., Normal, Disp-90 Tab, R-2             Dictation disclaimer:  Please note that this dictation was completed with [employer], the [employer] voice recognition software. Quite often unanticipated grammatical, syntax, homophones, and other interpretive errors are inadvertently transcribed by the computer software. Please disregard these errors. Please excuse any errors that have escaped final proofreading.

## 2021-03-16 NOTE — ED TRIAGE NOTES
Pt reports chest pressure and rapid heart rate after eating today at approx 1230. Pt states took GasX and felt better. Pt denies any radiation of pain, or shortness of breath. Pt presents in NAD.

## 2021-03-16 NOTE — DISCHARGE INSTRUCTIONS
Take medication as prescribed. Follow-up with your primary care physician and cardiologist for reassessment. Bring the results from this visit with you for their review. Return to the ED immediately for any new, worsening, or persistent symptoms, including chest pain, shortness of breath, or any other medical concerns.

## 2021-03-19 LAB — COLOGUARD TEST, EXTERNAL: NEGATIVE

## 2021-03-23 ENCOUNTER — TELEPHONE (OUTPATIENT)
Dept: INTERNAL MEDICINE CLINIC | Age: 56
End: 2021-03-23

## 2021-03-24 ENCOUNTER — TELEPHONE (OUTPATIENT)
Dept: INTERNAL MEDICINE CLINIC | Age: 56
End: 2021-03-24

## 2021-04-08 ENCOUNTER — TRANSCRIBE ORDER (OUTPATIENT)
Dept: SCHEDULING | Age: 56
End: 2021-04-08

## 2021-04-08 DIAGNOSIS — M54.12 BRACHIAL NEURITIS: Primary | ICD-10-CM

## 2021-04-16 ENCOUNTER — HOSPITAL ENCOUNTER (OUTPATIENT)
Age: 56
Discharge: HOME | End: 2021-04-16
Attending: PSYCHIATRY & NEUROLOGY
Payer: COMMERCIAL

## 2021-04-16 DIAGNOSIS — M54.12 BRACHIAL NEURITIS: ICD-10-CM

## 2021-04-16 PROCEDURE — 72141 MRI NECK SPINE W/O DYE: CPT

## 2021-04-20 ENCOUNTER — OFFICE VISIT (OUTPATIENT)
Dept: ORTHOPEDIC SURGERY | Age: 56
End: 2021-04-20
Payer: COMMERCIAL

## 2021-04-20 VITALS
TEMPERATURE: 97.5 F | WEIGHT: 250 LBS | HEART RATE: 87 BPM | HEIGHT: 77 IN | BODY MASS INDEX: 29.52 KG/M2 | DIASTOLIC BLOOD PRESSURE: 87 MMHG | OXYGEN SATURATION: 97 % | SYSTOLIC BLOOD PRESSURE: 133 MMHG

## 2021-04-20 DIAGNOSIS — M50.20 HNP (HERNIATED NUCLEUS PULPOSUS), CERVICAL: ICD-10-CM

## 2021-04-20 DIAGNOSIS — M48.02 CERVICAL STENOSIS OF SPINAL CANAL: ICD-10-CM

## 2021-04-20 DIAGNOSIS — M54.12 CERVICAL RADICULOPATHY: ICD-10-CM

## 2021-04-20 DIAGNOSIS — M54.2 NECK PAIN: Primary | ICD-10-CM

## 2021-04-20 PROCEDURE — 99213 OFFICE O/P EST LOW 20 MIN: CPT | Performed by: NURSE PRACTITIONER

## 2021-04-20 RX ORDER — GABAPENTIN 300 MG/1
300 CAPSULE ORAL
Qty: 30 CAP | Refills: 2 | Status: SHIPPED | OUTPATIENT
Start: 2021-04-20 | End: 2021-11-04

## 2021-04-20 NOTE — PROGRESS NOTES
Ildefonso Ware presents today for   Chief Complaint   Patient presents with    Neck Pain       Is someone accompanying this pt? no    Is the patient using any DME equipment during OV? no    Depression Screening:  3 most recent PHQ Screens 4/20/2021   Little interest or pleasure in doing things Not at all   Feeling down, depressed, irritable, or hopeless Not at all   Total Score PHQ 2 0       Learning Assessment:  Learning Assessment 9/22/2016   PRIMARY LEARNER Patient   HIGHEST LEVEL OF EDUCATION - PRIMARY LEARNER  GRADUATED HIGH SCHOOL OR GED   BARRIERS PRIMARY LEARNER NONE   CO-LEARNER CAREGIVER No   PRIMARY LANGUAGE ENGLISH   LEARNER PREFERENCE PRIMARY LISTENING   ANSWERED BY patient   RELATIONSHIP SELF       Abuse Screening:  Abuse Screening Questionnaire 3/8/2021   Do you ever feel afraid of your partner? N   Are you in a relationship with someone who physically or mentally threatens you? N   Is it safe for you to go home? Y       Fall Risk  Fall Risk Assessment, last 12 mths 3/8/2021   Able to walk? Yes   Fall in past 12 months? 0   Do you feel unsteady? 0   Are you worried about falling 0         Coordination of Care:  1. Have you been to the ER, urgent care clinic since your last visit? no  Hospitalized since your last visit? no    2. Have you seen or consulted any other health care providers outside of the [address] [place] [address] since your last visit? Yes, pcp and neurology Include any pap smears or colon screening.  no

## 2021-04-20 NOTE — PROGRESS NOTES
Chief complaint   Chief Complaint   Patient presents with    Neck Pain       History of Present Illness:  Ramesh Sainz is a  50 y.o.  male comes in today after last being seen October 8, 2019 as a new patient. At that time he had right-sided neck pain that radiated to his shoulder down to his elbow and occasionally down to his mid forearm. I put him on gabapentin 300 mg at bedtime. He does not remember if it really helped but he does remember he did not take it very consistently. I also put him in physical therapy which he states he did go but did not finish out the course. He returns today stating his right-sided neck pain has gotten worse and it is radiating from his shoulder down to his hand now. He states is a sharp pricking type pain and he gets numbness and tingling in his arm and especially on the ulnar side of his hand. He states it is more constant now and does bother him much more than it did when I first saw him. He states occasionally he gets some pain in the left but it is predominantly on the right. He states he saw a neurologist who did an EMG and he was told that he had some carpal tunnel but also had issues coming from his cervical spine. He states the neurologist called yesterday and was going to refer him somewhere but he decided to come back here. He is right-handed. He denies dropping things or having any dexterity issues. He states his balance does feel little bit off at times. He states his handwriting has become horrible. He works as an . He was smoking half pack cigarettes per day but he states he has just quit smoking and drinking altogether. He denies fever bowel bladder dysfunction. He also had an MRI recently ordered by the neurologist.      Physical Exam: The patient is a 61-year-old male well-developed well-nourished who is alert and oriented with a normal mood and affect. He has a full weightbearing nonantalgic gait.   He did not use any assistive device. He has 5 out of 5 strength of his bilateral upper extremities. Negative Carmen's. He has a essentially normal tandem gait. Assessment and Plan: This is a patient who recently underwent an MRI. I had Dr. José Pichardo review the MRI and it is consistent with the report of a predominantly right central protrusion moderate-sized herniation C4-5 with neuroforaminal stenosis bilaterally cord is mildly compressed. Mild to moderate osteophytic formation across the central canal C5-6 cord mildly effaced neuroforaminal stenosis bilaterally. We discussed his options of a cervical epidural to see if that would help alleviate his symptoms. If this does not help then he would likely be a surgical candidate. He is decided he would like to try the cervical epidural first.  I will refer him to Dr. Ramon Pleitez for this. We will see him back in 3-month sooner if needed.           Review of systems:    Past Medical History:   Diagnosis Date    Back pain, chronic     High cholesterol 9/13/2013    Sleep apnea      Past Surgical History:   Procedure Laterality Date    HX ORTHOPAEDIC      Broken Sturnum      Social History     Socioeconomic History    Marital status:      Spouse name: Not on file    Number of children: Not on file    Years of education: Not on file    Highest education level: Not on file   Occupational History    Not on file   Social Needs    Financial resource strain: Not on file    Food insecurity     Worry: Not on file     Inability: Not on file   [employer] needs     Medical: Not on file     Non-medical: Not on file   Tobacco Use    Smoking status: Current Every Day Smoker     Packs/day: 0.50     Years: 8.00     Pack years: 4.00    Smokeless tobacco: Never Used   Substance and Sexual Activity    Alcohol use:  Yes     Alcohol/week: 0.0 standard drinks     Comment: Drinks 1/2 gallon of liquor a week    Drug use: Yes     Comment: cocaine powder    Sexual activity: Yes Partners: Female     Birth control/protection: None   Lifestyle    Physical activity     Days per week: Not on file     Minutes per session: Not on file    Stress: Not on file   Relationships    Social connections     Talks on phone: Not on file     Gets together: Not on file     Attends [employer] service: Not on file     Active member of club or organization: Not on file     Attends meetings of clubs or organizations: Not on file     Relationship status: Not on file    Intimate partner violence     Fear of current or ex partner: Not on file     Emotionally abused: Not on file     Physically abused: Not on file     Forced sexual activity: Not on file   Other Topics Concern    Not on file   Social History Narrative    Not on file     Family History   Problem Relation Age of Onset    Hypertension Mother     Stroke Mother     Diabetes Mother     Heart Disease Mother     Heart Disease Father        Physical Exam:  Visit Vitals  /87 (BP 1 Location: Left upper arm, BP Patient Position: Sitting, BP Cuff Size: Adult long)   Pulse 87   Temp 97.5 °F (36.4 °C) (Tympanic)   Ht 6' 5\" (1.956 m)   Wt 250 lb (113.4 kg)   SpO2 97% Comment: RA   BMI 29.65 kg/m²     Pain Scale: 1/10       has been . reviewed and is appropriate          Diagnoses and all orders for this visit:    1. Neck pain  -     REFERRAL TO PAIN MANAGEMENT  -     gabapentin (Neurontin) 300 mg capsule; Take 1 Cap by mouth nightly. Max Daily Amount: 300 mg.    2. Cervical radiculopathy  -     REFERRAL TO PAIN MANAGEMENT  -     gabapentin (Neurontin) 300 mg capsule; Take 1 Cap by mouth nightly. Max Daily Amount: 300 mg.    3. HNP (herniated nucleus pulposus), cervical  -     REFERRAL TO PAIN MANAGEMENT  -     gabapentin (Neurontin) 300 mg capsule; Take 1 Cap by mouth nightly. Max Daily Amount: 300 mg.    4. Cervical stenosis of spinal canal  -     REFERRAL TO PAIN MANAGEMENT  -     gabapentin (Neurontin) 300 mg capsule; Take 1 Cap by mouth nightly. Max Daily Amount: 300 mg. Follow-up and Dispositions    · Return in about 3 months (around 7/20/2021) for with MAGDALENA Banuelos.              We have informed Andre Yeh to notify us for immediate appointment if he has any worsening neurogical symptoms or if an emergency situation presents, then call [phone]

## 2021-05-14 DIAGNOSIS — K21.9 GASTROESOPHAGEAL REFLUX DISEASE WITHOUT ESOPHAGITIS: ICD-10-CM

## 2021-05-14 RX ORDER — OMEPRAZOLE 20 MG/1
CAPSULE, DELAYED RELEASE ORAL
Qty: 180 CAP | Refills: 3 | Status: SHIPPED | OUTPATIENT
Start: 2021-05-14 | End: 2022-07-27

## 2021-07-20 ENCOUNTER — OFFICE VISIT (OUTPATIENT)
Dept: ORTHOPEDIC SURGERY | Age: 56
End: 2021-07-20
Payer: COMMERCIAL

## 2021-07-20 VITALS
DIASTOLIC BLOOD PRESSURE: 86 MMHG | TEMPERATURE: 97.2 F | HEART RATE: 88 BPM | SYSTOLIC BLOOD PRESSURE: 121 MMHG | RESPIRATION RATE: 20 BRPM | BODY MASS INDEX: 27.3 KG/M2 | HEIGHT: 77 IN | WEIGHT: 231.25 LBS | OXYGEN SATURATION: 97 %

## 2021-07-20 DIAGNOSIS — M48.02 CERVICAL STENOSIS OF SPINAL CANAL: ICD-10-CM

## 2021-07-20 DIAGNOSIS — M54.12 CERVICAL RADICULOPATHY: ICD-10-CM

## 2021-07-20 DIAGNOSIS — M50.20 HNP (HERNIATED NUCLEUS PULPOSUS), CERVICAL: ICD-10-CM

## 2021-07-20 DIAGNOSIS — M54.2 NECK PAIN: Primary | ICD-10-CM

## 2021-07-20 PROCEDURE — 99214 OFFICE O/P EST MOD 30 MIN: CPT | Performed by: NURSE PRACTITIONER

## 2021-07-20 RX ORDER — TOPIRAMATE 25 MG/1
TABLET ORAL
Qty: 90 TABLET | Refills: 1 | Status: SHIPPED | OUTPATIENT
Start: 2021-07-20 | End: 2021-11-04

## 2021-07-20 RX ORDER — IBUPROFEN 800 MG/1
800 TABLET ORAL
Qty: 30 TABLET | Refills: 1 | Status: SHIPPED | OUTPATIENT
Start: 2021-07-20 | End: 2022-03-24

## 2021-07-20 NOTE — PROGRESS NOTES
Chief complaint   Chief Complaint   Patient presents with    Neck Pain       History of Present Illness:  Jn Monteiro is a  50 y.o.  male who comes in today for follow-up of his right-sided neck pain that radiates to his right shoulder and down to his hand with numbness and tingling. Occasionally it will go into the left when his pain is severe. At the last visit we referred him to Dr. Astrid Austin for a cervical epidural steroid injection for his C4-5 herniated disc. He wants to try the epidural steroid injection prior to considering surgery which would be the next step. He states he never did hear to schedule the epidural steroid injection. He is also was prescribed gabapentin 300 mg at bedtime but he states it did not help and he stopped taking it. The patient is right-handed. He still has handwriting changes that have worsened and some balance dysfunction. He denies dropping things or have any dexterity issues. He works as an . He smokes half pack cigarettes per day. Physical Exam: The patient is a 79-year-old male well-developed well-nourished who is alert and oriented with a normal mood and affect. He has a full weightbearing nonantalgic gait. He did not use any assist device. He has 5 out of 5 strength bilateral upper extremities. Negative Carmen's. He has normal tandem gait. Assessment and Plan: This is a patient who has a right central C4-5 herniated disc. I spoke with our referral coordinator Kate Wyman and she reports that Dr. Jaja britton would not accept the patient for the cervical epidural due to past history of cocaine use. She did not send the referral to [facility] management for Dr. Troy Gudino but did not hear back from them. She will contact Dr. Artie britton again and emphasized that this is just a cervical epidural steroid injection and not pain management. Hopefully the patient will be able to get in and have this injection is done.   I explained to the patient if he has not heard anything in 3 weeks to give us a call back here so that we can follow-up on it. I will DC his gabapentin since it is ineffective. I will give him Topamax to ramp up to 75 mg at night. We will see him back in 3-month sooner if needed. Medications:  Current Outpatient Medications   Medication Sig Dispense Refill    ibuprofen (MOTRIN) 800 mg tablet Take 1 Tablet by mouth daily as needed for Pain. With food 30 Tablet 1    topiramate (Topamax) 25 mg tablet 1 tab nightly  x 5 days, then 2 tabs nightly x 5 day and then 3 tabs nightly 90 Tablet 1    omeprazole (PRILOSEC) 20 mg capsule take 1 capsule by mouth twice a day 180 Cap 3    testosterone cypionate (DEPOTESTOTERONE CYPIONATE) 200 mg/mL injection INJECT 1 ML INTRAMUSCULARLY ONCE EVERY 2 WEEKS 6 mL 1    gabapentin (Neurontin) 300 mg capsule Take 1 Cap by mouth nightly. Max Daily Amount: 300 mg. 30 Cap 2    doxepin (SINEquan) 50 mg capsule Take 1 Cap by mouth nightly. Indications: insomnia 30 Cap 3    FLUoxetine (PROzac) 20 mg capsule Take 1 Cap by mouth daily. 30 Cap 5    ibuprofen (MOTRIN) 800 mg tablet Take 1 Tab by mouth every eight (8) hours as needed for Pain. 90 Tab 2           Review of systems:    Past Medical History:   Diagnosis Date    Back pain, chronic     High cholesterol 9/13/2013    Sleep apnea      Past Surgical History:   Procedure Laterality Date    HX ORTHOPAEDIC      Broken Sturnum      Social History     Socioeconomic History    Marital status:      Spouse name: Not on file    Number of children: Not on file    Years of education: Not on file    Highest education level: Not on file   Occupational History    Not on file   Tobacco Use    Smoking status: Current Every Day Smoker     Packs/day: 0.50     Years: 8.00     Pack years: 4.00    Smokeless tobacco: Never Used   Vaping Use    Vaping Use: Never used   Substance and Sexual Activity    Alcohol use:  Yes Alcohol/week: 0.0 standard drinks     Comment: Drinks 1/2 gallon of liquor a week    Drug use: Yes     Comment: cocaine powder    Sexual activity: Yes     Partners: Female     Birth control/protection: None   Other Topics Concern    Not on file   Social History Narrative    Not on file     Social Determinants of Health     Financial Resource Strain:     Difficulty of Paying Living Expenses:    Food Insecurity:     Worried About Running Out of Food in the Last Year:     [address] in the Last Year:    Transportation Needs:     Lack of Transportation (Medical):  Lack of Transportation (Non-Medical):    Physical Activity:     Days of Exercise per Week:     Minutes of Exercise per Session:    Stress:     Feeling of Stress :    Social Connections:     Frequency of Communication with Friends and Family:     Frequency of Social Gatherings with Friends and Family:     Attends Mu-[employer] Services:     Active Member of Clubs or Organizations:     Attends Club or Organization Meetings:     Marital Status:    Intimate Partner Violence:     Fear of Current or Ex-Partner:     Emotionally Abused:     Physically Abused:     Sexually Abused:      Family History   Problem Relation Age of Onset    Hypertension Mother     Stroke Mother     Diabetes Mother     Heart Disease Mother     Heart Disease Father        Physical Exam:  Visit Vitals  /86 (BP 1 Location: Right arm, BP Patient Position: Sitting, BP Cuff Size: Adult)   Pulse 88   Temp 97.2 °F (36.2 °C) (Temporal)   Resp 20   Ht 6' 5\" (1.956 m)   Wt 231 lb 4 oz (104.9 kg)   SpO2 97%   BMI 27.42 kg/m²     Pain Scale: 2/10       has been . reviewed and is appropriate          Diagnoses and all orders for this visit:    1. Neck pain  -     ibuprofen (MOTRIN) 800 mg tablet; Take 1 Tablet by mouth daily as needed for Pain.  With food  -     topiramate (Topamax) 25 mg tablet; 1 tab nightly  x 5 days, then 2 tabs nightly x 5 day and then 3 tabs nightly    2. Cervical radiculopathy  -     ibuprofen (MOTRIN) 800 mg tablet; Take 1 Tablet by mouth daily as needed for Pain. With food  -     topiramate (Topamax) 25 mg tablet; 1 tab nightly  x 5 days, then 2 tabs nightly x 5 day and then 3 tabs nightly    3. HNP (herniated nucleus pulposus), cervical  -     ibuprofen (MOTRIN) 800 mg tablet; Take 1 Tablet by mouth daily as needed for Pain. With food  -     topiramate (Topamax) 25 mg tablet; 1 tab nightly  x 5 days, then 2 tabs nightly x 5 day and then 3 tabs nightly    4. Cervical stenosis of spinal canal  -     ibuprofen (MOTRIN) 800 mg tablet; Take 1 Tablet by mouth daily as needed for Pain. With food  -     topiramate (Topamax) 25 mg tablet; 1 tab nightly  x 5 days, then 2 tabs nightly x 5 day and then 3 tabs nightly            Follow-up and Dispositions    · Return in about 3 months (around 10/20/2021) for with MAGDALENA Banuelos.              We have informed Selby Aase to notify us for immediate appointment if he has any worsening neurogical symptoms or if an emergency situation presents, then call [phone]

## 2021-07-20 NOTE — PROGRESS NOTES
1. Have you been to an emergency room or urgent care clinic since your last visit? No     Hospitalized since your last visit? If yes, where, when, and reason for visit? No     2. Have you seen or consulted any other health care providers outside of the [facility] since your last visit including any procedures, health maintenance items. If yes, where, when and reason for visit?  No            3 most recent PHQ Screens 7/20/2021   Little interest or pleasure in doing things Not at all   Feeling down, depressed, irritable, or hopeless Not at all   Total Score PHQ 2 0

## 2021-11-04 ENCOUNTER — OFFICE VISIT (OUTPATIENT)
Dept: INTERNAL MEDICINE CLINIC | Age: 56
End: 2021-11-04
Payer: COMMERCIAL

## 2021-11-04 ENCOUNTER — HOSPITAL ENCOUNTER (OUTPATIENT)
Dept: LAB | Age: 56
Discharge: HOME | End: 2021-11-04
Payer: COMMERCIAL

## 2021-11-04 VITALS
TEMPERATURE: 97.4 F | OXYGEN SATURATION: 98 % | HEART RATE: 102 BPM | SYSTOLIC BLOOD PRESSURE: 146 MMHG | DIASTOLIC BLOOD PRESSURE: 97 MMHG | WEIGHT: 230 LBS | RESPIRATION RATE: 20 BRPM | BODY MASS INDEX: 27.16 KG/M2 | HEIGHT: 77 IN

## 2021-11-04 DIAGNOSIS — R79.89 ELEVATED LFTS: ICD-10-CM

## 2021-11-04 DIAGNOSIS — I10 ESSENTIAL HYPERTENSION: ICD-10-CM

## 2021-11-04 DIAGNOSIS — E78.5 DYSLIPIDEMIA: ICD-10-CM

## 2021-11-04 DIAGNOSIS — I10 ESSENTIAL HYPERTENSION: Primary | ICD-10-CM

## 2021-11-04 DIAGNOSIS — Z23 NEEDS FLU SHOT: ICD-10-CM

## 2021-11-04 DIAGNOSIS — F10.10 ETOH ABUSE: ICD-10-CM

## 2021-11-04 LAB
ALBUMIN SERPL-MCNC: 3.8 G/DL (ref 3.4–5)
ALBUMIN/GLOB SERPL: 1.2 {RATIO} (ref 0.8–1.7)
ALP SERPL-CCNC: 55 U/L (ref 45–117)
ALT SERPL-CCNC: 83 U/L (ref 16–61)
ANION GAP SERPL CALC-SCNC: 4 MMOL/L (ref 3–18)
AST SERPL-CCNC: 47 U/L (ref 10–38)
BILIRUB SERPL-MCNC: 0.7 MG/DL (ref 0.2–1)
BUN SERPL-MCNC: 11 MG/DL (ref 7–18)
BUN/CREAT SERPL: 14 (ref 12–20)
CALCIUM SERPL-MCNC: 9.6 MG/DL (ref 8.5–10.1)
CHLORIDE SERPL-SCNC: 107 MMOL/L (ref 100–111)
CHOLEST SERPL-MCNC: 220 MG/DL
CO2 SERPL-SCNC: 28 MMOL/L (ref 21–32)
CREAT SERPL-MCNC: 0.77 MG/DL (ref 0.6–1.3)
CREAT UR-MCNC: 166 MG/DL (ref 30–125)
GLOBULIN SER CALC-MCNC: 3.3 G/DL (ref 2–4)
GLUCOSE SERPL-MCNC: 88 MG/DL (ref 74–99)
HDLC SERPL-MCNC: 91 MG/DL (ref 40–60)
HDLC SERPL: 2.4 {RATIO} (ref 0–5)
LDLC SERPL CALC-MCNC: 113.4 MG/DL (ref 0–100)
LIPID PROFILE,FLP: ABNORMAL
MICROALBUMIN UR-MCNC: 1.05 MG/DL (ref 0–3)
MICROALBUMIN/CREAT UR-RTO: 6 MG/G (ref 0–30)
POTASSIUM SERPL-SCNC: 4.4 MMOL/L (ref 3.5–5.5)
PROT SERPL-MCNC: 7.1 G/DL (ref 6.4–8.2)
SODIUM SERPL-SCNC: 139 MMOL/L (ref 136–145)
TRIGL SERPL-MCNC: 78 MG/DL (ref ?–150)
VLDLC SERPL CALC-MCNC: 15.6 MG/DL

## 2021-11-04 PROCEDURE — 80053 COMPREHEN METABOLIC PANEL: CPT

## 2021-11-04 PROCEDURE — 90471 IMMUNIZATION ADMIN: CPT | Performed by: INTERNAL MEDICINE

## 2021-11-04 PROCEDURE — 82043 UR ALBUMIN QUANTITATIVE: CPT

## 2021-11-04 PROCEDURE — 99213 OFFICE O/P EST LOW 20 MIN: CPT | Performed by: INTERNAL MEDICINE

## 2021-11-04 PROCEDURE — 80061 LIPID PANEL: CPT

## 2021-11-04 PROCEDURE — 90686 IIV4 VACC NO PRSV 0.5 ML IM: CPT | Performed by: INTERNAL MEDICINE

## 2021-11-04 RX ORDER — METOPROLOL SUCCINATE 50 MG/1
50 TABLET, EXTENDED RELEASE ORAL DAILY
Qty: 30 TABLET | Refills: 2 | Status: SHIPPED | OUTPATIENT
Start: 2021-11-04 | End: 2021-11-30 | Stop reason: SDUPTHER

## 2021-11-04 NOTE — PATIENT INSTRUCTIONS
High Blood Pressure: Care Instructions  Overview     It's normal for blood pressure to go up and down throughout the day. But if it stays up, you have high blood pressure. Another name for high blood pressure is hypertension. Despite what a lot of people think, high blood pressure usually doesn't cause headaches or make you feel dizzy or lightheaded. It usually has no symptoms. But it does increase your risk of stroke, heart attack, and other problems. You and your doctor will talk about your risks of these problems based on your blood pressure. Your doctor will give you a goal for your blood pressure. Your goal will be based on your health and your age. Lifestyle changes, such as eating healthy and being active, are always important to help lower blood pressure. You might also take medicine to reach your blood pressure goal.  Follow-up care is a key part of your treatment and safety. Be sure to make and go to all appointments, and call your doctor if you are having problems. It's also a good idea to know your test results and keep a list of the medicines you take. How can you care for yourself at home? Medical treatment  · If you stop taking your medicine, your blood pressure will go back up. You may take one or more types of medicine to lower your blood pressure. Be safe with medicines. Take your medicine exactly as prescribed. Call your doctor if you think you are having a problem with your medicine. · Talk to your doctor before you start taking aspirin every day. Aspirin can help certain people lower their risk of a heart attack or stroke. But taking aspirin isn't right for everyone, because it can cause serious bleeding. · See your doctor regularly. You may need to see the doctor more often at first or until your blood pressure comes down. · If you are taking blood pressure medicine, talk to your doctor before you take decongestants or anti-inflammatory medicine, such as ibuprofen.  Some of these medicines can raise blood pressure. · Learn how to check your blood pressure at home. Lifestyle changes  · Stay at a healthy weight. This is especially important if you put on weight around the waist. Losing even 10 pounds can help you lower your blood pressure. · If your doctor recommends it, get more exercise. Walking is a good choice. Bit by bit, increase the amount you walk every day. Try for at least 30 minutes on most days of the week. You also may want to swim, bike, or do other activities. · Avoid or limit alcohol. Talk to your doctor about whether you can drink any alcohol. · Try to limit how much sodium you eat to less than 2,300 milligrams (mg) a day. Your doctor may ask you to try to eat less than 1,500 mg a day. · Eat plenty of fruits (such as bananas and oranges), vegetables, legumes, whole grains, and low-fat dairy products. · Lower the amount of saturated fat in your diet. Saturated fat is found in animal products such as milk, cheese, and meat. Limiting these foods may help you lose weight and also lower your risk for heart disease. · Do not smoke. Smoking increases your risk for heart attack and stroke. If you need help quitting, talk to your doctor about stop-smoking programs and medicines. These can increase your chances of quitting for good. When should you call for help? Call 911  anytime you think you may need emergency care. This may mean having symptoms that suggest that your blood pressure is causing a serious heart or blood vessel problem. Your blood pressure may be over 180/120. For example, call 911 if:    · You have symptoms of a heart attack. These may include:  ? Chest pain or pressure, or a strange feeling in the chest.  ? Sweating. ? Shortness of breath. ? Nausea or vomiting. ? Pain, pressure, or a strange feeling in the back, neck, jaw, or upper belly or in one or both shoulders or arms. ? Lightheadedness or sudden weakness.   ? A fast or irregular heartbeat.     · You have symptoms of a stroke. These may include:  ? Sudden numbness, tingling, weakness, or loss of movement in your face, arm, or leg, especially on only one side of your body. ? Sudden vision changes. ? Sudden trouble speaking. ? Sudden confusion or trouble understanding simple statements. ? Sudden problems with walking or balance. ? A sudden, severe headache that is different from past headaches.     · You have severe back or belly pain. Do not wait until your blood pressure comes down on its own. Get help right away. Call your doctor now or seek immediate care if:    · Your blood pressure is much higher than normal (such as 180/120 or higher), but you don't have symptoms.     · You think high blood pressure is causing symptoms, such as:  ? Severe headache.  ? Blurry vision. Watch closely for changes in your health, and be sure to contact your doctor if:    · Your blood pressure measures higher than your doctor recommends at least 2 times. That means the top number is higher or the bottom number is higher, or both.     · You think you may be having side effects from your blood pressure medicine. Where can you learn more? Go to http://www.gray.com/  Enter [id number] in the search box to learn more about \"High Blood Pressure: Care Instructions. \"  Current as of: April 29, 2021               Content Version: 13.0  © 2006-2021 [employer]. Care instructions adapted under license by [employer] (which disclaims liability or warranty for this information). If you have questions about a medical condition or this instruction, always ask your healthcare professional. Hurley Kesha any warranty or liability for your use of this information. Influenza (Flu) Vaccine (Inactivated or Recombinant): What You Need to Know  Why get vaccinated? Influenza vaccine can prevent influenza (flu).   Flu is a contagious disease that spreads around the [facility] every year, usually between October and May. Anyone can get the flu, but it is more dangerous for some people. Infants and young children, people 72years of age and older, pregnant women, and people with certain health conditions or a weakened immune system are at greatest risk of flu complications. Pneumonia, bronchitis, sinus infections and ear infections are examples of flu-related complications. If you have a medical condition, such as heart disease, cancer or diabetes, flu can make it worse. Flu can cause fever and chills, sore throat, muscle aches, fatigue, cough, headache, and runny or stuffy nose. Some people may have vomiting and diarrhea, though this is more common in children than adults. Each year, thousands of people in the [facility] die from flu, and many more are hospitalized. Flu vaccine prevents millions of illnesses and flu-related visits to the doctor each year. Influenza vaccine  CDC recommends everyone 10months of age and older get vaccinated every flu season. Children 6 months through 6years of age may need 2 doses during a single flu season. Everyone else needs only 1 dose each flu season. It takes about 2 weeks for protection to develop after vaccination. There are many flu viruses, and they are always changing. Each year a new flu vaccine is made to protect against three or four viruses that are likely to cause disease in the upcoming flu season. Even when the vaccine doesn't exactly match these viruses, it may still provide some protection. Influenza vaccine does not cause flu. Influenza vaccine may be given at the same time as other vaccines. Talk with your health care provider  Tell your vaccine provider if the person getting the vaccine:  · Has had an allergic reaction after a previous dose of influenza vaccine, or has any severe, life-threatening allergies. · Has ever had Guillain-Barré Syndrome (also called GBS).   In some cases, your health care provider may decide to postpone influenza vaccination to a future visit. People with minor illnesses, such as a cold, may be vaccinated. People who are moderately or severely ill should usually wait until they recover before getting influenza vaccine. Your health care provider can give you more information. Risks of a vaccine reaction  · Soreness, redness, and swelling where shot is given, fever, muscle aches, and headache can happen after influenza vaccine. · There may be a very small increased risk of Guillain-Barré Syndrome (GBS) after inactivated influenza vaccine (the flu shot). Aimee Levin children who get the flu shot along with pneumococcal vaccine (PCV13), and/or DTaP vaccine at the same time might be slightly more likely to have a seizure caused by fever. Tell your health care provider if a child who is getting flu vaccine has ever had a seizure. People sometimes faint after medical procedures, including vaccination. Tell your provider if you feel dizzy or have vision changes or ringing in the ears. As with any medicine, there is a very remote chance of a vaccine causing a severe allergic reaction, other serious injury, or death. What if there is a serious problem? An allergic reaction could occur after the vaccinated person leaves the clinic. If you see signs of a severe allergic reaction (hives, swelling of the face and throat, difficulty breathing, a fast heartbeat, dizziness, or weakness), call 9-1-1 and get the person to the nearest hospital.  For other signs that concern you, call your health care provider. Adverse reactions should be reported to the Vaccine Adverse Event Reporting System (VAERS). Your health care provider will usually file this report, or you can do it yourself. Visit the VAERS website at www.vaers. hhs.gov or call 1-874.436.4311. VAERS is only for reporting reactions, and VAERS staff do not give medical advice.   The [employer] Vaccine Injury Compensation Program  [employer] Injury Compensation Program (VICP) is a federal program that was created to compensate people who may have been injured by certain vaccines. Visit the VICP website at www.hrsa.gov/vaccinecompensation or call 1-325.247.5304 to learn about the program and about filing a claim. There is a time limit to file a claim for compensation. How can I learn more? · Ask your healthcare provider. · Call your local or state health department. · Contact the Centers for Disease Control and Prevention (CDC):  ? Call 3-686.573.2887 (1-800-CDC-INFO) or  ? Visit CDC's website at www.cdc.gov/flu  Vaccine Information Statement (Interim)  Inactivated Influenza Vaccine  8/15/2019  42 TRISTEN Lozada 336ZX-02  Department of Health and Human Services  Centers for Disease Control and Prevention  Many Vaccine Information Statements are available in [place] and other languages. See www.immunize.org/vis. Muchas hojas de información sobre vacunas están disponibles en español y en otros idiomas. Visite www.immunize.org/vis. Care instructions adapted under license by [employer] (which disclaims liability or warranty for this information). If you have questions about a medical condition or this instruction, always ask your healthcare professional. [address] any warranty or liability for your use of this information.

## 2021-11-04 NOTE — PROGRESS NOTES
Patient is in the office today for a 6 month follow up. Do you have an Advance Directive no  Do you want more information : information given     1. Have you been to the ER, urgent care clinic since your last visit? Hospitalized since your last visit? No    2. Have you seen or consulted any other health care providers outside of the [address] [place] [address] since your last visit? Include any pap smears or colon screening. No      Verbal order read back per Dr. Rodrigo Andrews flu vaccine. Patient received flu vaccine in right deltoid. Patient was observed and no signs or symptoms of an allergic reaction noted at this time. Patient tolerated well and left without complaints. Patient received flu VIS.

## 2021-11-08 NOTE — PROGRESS NOTES
Kirit Trevino is a 49 y.o.  male and presents with Hypertension (f/u), Immunization/Injection (flu vaccine), Cholesterol Problem, and Alcohol Problem      SUBJECTIVE:    Patient's high blood pressure is uncontrolled due to multiple factors. Patient has poor sleep hygiene and is currently on testosterone replacement. Patient also drinks alcohol on a regular basis. He has been encouraged to improve his lifestyle with diet, exercise and cutting back alcohol. His heart rate on the high side probably related to history of alcohol use. We will go ahead and start patient on a beta-blocker to try and improve his blood pressure as well as improve his heart rate. Respiratory ROS: negative for - shortness of breath  Cardiovascular ROS: negative for - chest pain    Current Outpatient Medications   Medication Sig    metoprolol succinate (TOPROL-XL) 50 mg XL tablet Take 1 Tablet by mouth daily.  ibuprofen (MOTRIN) 800 mg tablet Take 1 Tablet by mouth daily as needed for Pain. With food    omeprazole (PRILOSEC) 20 mg capsule take 1 capsule by mouth twice a day    testosterone cypionate (DEPOTESTOTERONE CYPIONATE) 200 mg/mL injection INJECT 1 ML INTRAMUSCULARLY ONCE EVERY 2 WEEKS    ibuprofen (MOTRIN) 800 mg tablet Take 1 Tab by mouth every eight (8) hours as needed for Pain. No current facility-administered medications for this visit.          OBJECTIVE:  alert, well appearing, and in no distress  Visit Vitals  BP (!) 146/97   Pulse (!) 102   Temp 97.4 °F (36.3 °C) (Temporal)   Resp 20   Ht 6' 5\" (1.956 m)   Wt 230 lb (104.3 kg)   SpO2 98%   BMI 27.27 kg/m²      well developed and well nourished          Labs:   Lab Results   Component Value Date/Time    Cholesterol, total 220 (H) 11/04/2021 09:10 AM    HDL Cholesterol 91 (H) 11/04/2021 09:10 AM    LDL, calculated 113.4 (H) 11/04/2021 09:10 AM    Triglyceride 78 11/04/2021 09:10 AM    CHOL/HDL Ratio 2.4 11/04/2021 09:10 AM     Lab Results   Component Value Date/Time    Sodium 139 11/04/2021 09:10 AM    Potassium 4.4 11/04/2021 09:10 AM    Chloride 107 11/04/2021 09:10 AM    CO2 28 11/04/2021 09:10 AM    Anion gap 4 11/04/2021 09:10 AM    Glucose 88 11/04/2021 09:10 AM    BUN 11 11/04/2021 09:10 AM    Creatinine 0.77 11/04/2021 09:10 AM    BUN/Creatinine ratio 14 11/04/2021 09:10 AM    GFR est AA >60 11/04/2021 09:10 AM    GFR est non-AA >60 11/04/2021 09:10 AM    Calcium 9.6 11/04/2021 09:10 AM    Bilirubin, total 0.7 11/04/2021 09:10 AM    ALT (SGPT) 83 (H) 11/04/2021 09:10 AM    Alk. phosphatase 55 11/04/2021 09:10 AM    Protein, total 7.1 11/04/2021 09:10 AM    Albumin 3.8 11/04/2021 09:10 AM    Globulin 3.3 11/04/2021 09:10 AM    A-G Ratio 1.2 11/04/2021 09:10 AM      Lab Results   Component Value Date/Time    Hemoglobin A1c (POC) 4.9 11/26/2014 09:18 AM        Discussed the patient's BMI with him. The BMI follow up plan is as follows: I have counseled this patient on diet and exercise regimens. Assessment/Plan      ICD-10-CM ICD-9-CM    1. Essential hypertension  I10 401.9 Uncontrolled. Will check METABOLIC PANEL, COMPREHENSIVE      LIPID PANEL      MICROALBUMIN, UR, RAND W/ MICROALB/CREAT RATIO      Will start on metoprolol succinate (TOPROL-XL) 50 mg XL tablet   2. Dyslipidemia  E78.5 272.4  patient will try and improve this by cutting back starches and sweets in his diet   3. Elevated LFTs  R79.89 790.6  this is due to history of alcohol abuse. Patient to decrease alcohol use. 4. Needs flu shot  Z23 V04.81 INFLUENZA VIRUS VAC QUAD,SPLIT,PRESV FREE SYRINGE IM   5. ETOH abuse  F10.10 305.00  patient encouraged to cut back alcohol use     Follow-up and Dispositions    · Return in about 4 weeks (around 12/2/2021) for BP check. Reviewed plan of care. Patient has provided input and agrees with goals.

## 2021-11-26 DIAGNOSIS — I10 ESSENTIAL HYPERTENSION: ICD-10-CM

## 2021-11-26 RX ORDER — METOPROLOL SUCCINATE 50 MG/1
TABLET, EXTENDED RELEASE ORAL DAILY
Status: CANCELLED | OUTPATIENT
Start: 2021-11-26

## 2021-11-26 NOTE — TELEPHONE ENCOUNTER
Patient states he has been taking 2 per day instead of 1. He is now out of medication and needs a refill.  Please advise    Last visit:  11/4/21 with MD Curtis  Next appt:  12/3/21 with MD Karissa Franco

## 2021-11-30 RX ORDER — METOPROLOL SUCCINATE 100 MG/1
100 TABLET, EXTENDED RELEASE ORAL DAILY
Qty: 30 TABLET | Refills: 5 | Status: SHIPPED | OUTPATIENT
Start: 2021-11-30 | End: 2022-03-24

## 2021-11-30 NOTE — TELEPHONE ENCOUNTER
Patient contacted me back. Name and  were verified. I advised patient of the 100mg being sent to the pharmacy and to take 1 tab per day. Patient verbalized understanding. No further action needed.

## 2021-12-03 ENCOUNTER — OFFICE VISIT (OUTPATIENT)
Dept: INTERNAL MEDICINE CLINIC | Age: 56
End: 2021-12-03
Payer: COMMERCIAL

## 2021-12-03 VITALS
HEART RATE: 79 BPM | WEIGHT: 228 LBS | TEMPERATURE: 97.7 F | HEIGHT: 77 IN | OXYGEN SATURATION: 98 % | RESPIRATION RATE: 20 BRPM | SYSTOLIC BLOOD PRESSURE: 113 MMHG | BODY MASS INDEX: 26.92 KG/M2 | DIASTOLIC BLOOD PRESSURE: 80 MMHG

## 2021-12-03 DIAGNOSIS — R79.89 ELEVATED LFTS: ICD-10-CM

## 2021-12-03 DIAGNOSIS — I10 ESSENTIAL HYPERTENSION: Primary | ICD-10-CM

## 2021-12-03 DIAGNOSIS — E78.5 DYSLIPIDEMIA: ICD-10-CM

## 2021-12-03 PROCEDURE — 99212 OFFICE O/P EST SF 10 MIN: CPT | Performed by: INTERNAL MEDICINE

## 2021-12-03 NOTE — PROGRESS NOTES
Patient is in the office today for a 4 week  follow up. Patient states he has not picked up Metoprolol 100 mg from the pharmacy yet. Colin Aragon \"Have you been to the ER, urgent care clinic since your last visit? Hospitalized since your last visit? \" No    2. \"Have you seen or consulted any other health care providers outside of the [address] [place] [address] since your last visit? \" No     3. For patients aged 39-70: Has the patient had a colonoscopy? No     If the patient is female:    4. For patients aged 41-77: Has the patient had a mammogram within the past 2 years? NA based on age or sex    11. For patients aged 21-65: Has the patient had a pap smear?  NA based on age or sex

## 2021-12-05 NOTE — PROGRESS NOTES
Michel Villa is a 49 y.o.  male and presents with Blood Pressure Check (f/u)      SUBJECTIVE:    Patient is high blood pressure is much better controlled on Toprol- mg daily. Patient continues to be encouraged to cut back alcohol use. This is most likely contributing to his LFTs. He is currently on testosterone replacement therapy      Current Outpatient Medications   Medication Sig    metoprolol succinate (TOPROL-XL) 100 mg tablet Take 1 Tablet by mouth daily.  testosterone cypionate (DEPOTESTOTERONE CYPIONATE) 200 mg/mL injection INJECT 1 ML INTRAMUSCULARLY ONCE EVERY 2 WEEKS    ibuprofen (MOTRIN) 800 mg tablet Take 1 Tablet by mouth daily as needed for Pain. With food    omeprazole (PRILOSEC) 20 mg capsule take 1 capsule by mouth twice a day    ibuprofen (MOTRIN) 800 mg tablet Take 1 Tab by mouth every eight (8) hours as needed for Pain. No current facility-administered medications for this visit.          OBJECTIVE:  alert, well appearing, and in no distress  Visit Vitals  /80 (BP 1 Location: Left arm, BP Patient Position: Sitting, BP Cuff Size: Adult)   Pulse 79   Temp 97.7 °F (36.5 °C) (Temporal)   Resp 20   Ht 6' 5\" (1.956 m)   Wt 228 lb (103.4 kg)   SpO2 98%   BMI 27.04 kg/m²      well developed and well nourished          Labs:   Lab Results   Component Value Date/Time    Cholesterol, total 220 (H) 11/04/2021 09:10 AM    HDL Cholesterol 91 (H) 11/04/2021 09:10 AM    LDL, calculated 113.4 (H) 11/04/2021 09:10 AM    Triglyceride 78 11/04/2021 09:10 AM    CHOL/HDL Ratio 2.4 11/04/2021 09:10 AM     Lab Results   Component Value Date/Time    Sodium 139 11/04/2021 09:10 AM    Potassium 4.4 11/04/2021 09:10 AM    Chloride 107 11/04/2021 09:10 AM    CO2 28 11/04/2021 09:10 AM    Anion gap 4 11/04/2021 09:10 AM    Glucose 88 11/04/2021 09:10 AM    BUN 11 11/04/2021 09:10 AM    Creatinine 0.77 11/04/2021 09:10 AM    BUN/Creatinine ratio 14 11/04/2021 09:10 AM    GFR est AA >60 11/04/2021 09:10 AM    GFR est non-AA >60 11/04/2021 09:10 AM    Calcium 9.6 11/04/2021 09:10 AM    Bilirubin, total 0.7 11/04/2021 09:10 AM    ALT (SGPT) 83 (H) 11/04/2021 09:10 AM    Alk. phosphatase 55 11/04/2021 09:10 AM    Protein, total 7.1 11/04/2021 09:10 AM    Albumin 3.8 11/04/2021 09:10 AM    Globulin 3.3 11/04/2021 09:10 AM    A-G Ratio 1.2 11/04/2021 09:10 AM        Assessment/Plan      ICD-10-CM ICD-9-CM    1. Essential hypertension  I10 401.9  controlled on Toprol- mg daily METABOLIC PANEL, COMPREHENSIVE   2. Dyslipidemia  E78.5 272.4  patient to try and improve by cutting back sugar and starches as well as alcohol LIPID PANEL   3. Elevated LFTs  R79.89 790.6  Due  mostly to alcohol use which patient has been encouraged to cut back     Follow-up and Dispositions    · Return in about 6 months (around 6/3/2022) for labs 1 week before. Reviewed plan of care. Patient has provided input and agrees with goals.

## 2022-01-12 ENCOUNTER — TELEPHONE (OUTPATIENT)
Dept: INTERNAL MEDICINE CLINIC | Age: 57
End: 2022-01-12

## 2022-01-12 DIAGNOSIS — F10.982 ALCOHOL-INDUCED INSOMNIA (HCC): Primary | ICD-10-CM

## 2022-01-12 RX ORDER — ZOLPIDEM TARTRATE 10 MG/1
10 TABLET ORAL
Qty: 30 TABLET | Refills: 2 | Status: SHIPPED | OUTPATIENT
Start: 2022-01-12 | End: 2022-06-10 | Stop reason: SDUPTHER

## 2022-01-12 NOTE — TELEPHONE ENCOUNTER
----- Message from Marco Lambert sent at 1/12/2022 12:12 PM EST -----  Subject: Message to Provider    QUESTIONS  Information for Provider? PT is wondering if Dr. Lynn Damian could do the 10mg   Ambien like they talked about before still having issues sleeping with the   5mg   ---------------------------------------------------------------------------  --------------  CALL BACK INFO  What is the best way for the office to contact you? OK to leave message on   voicemail  Preferred Call Back Phone Number? [phone]  ---------------------------------------------------------------------------  --------------  SCRIPT ANSWERS  Relationship to Patient?  Self

## 2022-01-13 NOTE — TELEPHONE ENCOUNTER
Left detailed message for patient increased dose of medication was sent to the pharmacy. Any questions call the office.

## 2022-03-19 PROBLEM — K21.9 GASTROESOPHAGEAL REFLUX DISEASE WITHOUT ESOPHAGITIS: Status: ACTIVE | Noted: 2017-06-13

## 2022-05-07 DIAGNOSIS — I10 ESSENTIAL HYPERTENSION: ICD-10-CM

## 2022-05-09 RX ORDER — METOPROLOL SUCCINATE 100 MG/1
100 TABLET, EXTENDED RELEASE ORAL DAILY
Qty: 90 TABLET | Refills: 1 | Status: SHIPPED | OUTPATIENT
Start: 2022-05-09

## 2022-05-09 RX ORDER — METOPROLOL SUCCINATE 50 MG/1
TABLET, EXTENDED RELEASE ORAL
Qty: 30 TABLET | OUTPATIENT
Start: 2022-05-09

## 2022-06-03 ENCOUNTER — HOSPITAL ENCOUNTER (OUTPATIENT)
Dept: LAB | Age: 57
Discharge: HOME | End: 2022-06-03
Payer: COMMERCIAL

## 2022-06-03 ENCOUNTER — APPOINTMENT (OUTPATIENT)
Dept: INTERNAL MEDICINE CLINIC | Age: 57
End: 2022-06-03

## 2022-06-03 DIAGNOSIS — I10 ESSENTIAL HYPERTENSION: ICD-10-CM

## 2022-06-03 DIAGNOSIS — E78.5 DYSLIPIDEMIA: ICD-10-CM

## 2022-06-03 LAB
ALBUMIN SERPL-MCNC: 4.1 G/DL (ref 3.4–5)
ALBUMIN/GLOB SERPL: 1.4 {RATIO} (ref 0.8–1.7)
ALP SERPL-CCNC: 45 U/L (ref 45–117)
ALT SERPL-CCNC: 241 U/L (ref 16–61)
ANION GAP SERPL CALC-SCNC: 5 MMOL/L (ref 3–18)
AST SERPL-CCNC: 188 U/L (ref 10–38)
BILIRUB SERPL-MCNC: 0.4 MG/DL (ref 0.2–1)
BUN SERPL-MCNC: 16 MG/DL (ref 7–18)
BUN/CREAT SERPL: 19 (ref 12–20)
CALCIUM SERPL-MCNC: 9.5 MG/DL (ref 8.5–10.1)
CHLORIDE SERPL-SCNC: 105 MMOL/L (ref 100–111)
CHOLEST SERPL-MCNC: 230 MG/DL
CO2 SERPL-SCNC: 27 MMOL/L (ref 21–32)
CREAT SERPL-MCNC: 0.84 MG/DL (ref 0.6–1.3)
GLOBULIN SER CALC-MCNC: 2.9 G/DL (ref 2–4)
GLUCOSE SERPL-MCNC: 95 MG/DL (ref 74–99)
HDLC SERPL-MCNC: 99 MG/DL (ref 40–60)
HDLC SERPL: 2.3 {RATIO} (ref 0–5)
LDLC SERPL CALC-MCNC: 105.6 MG/DL (ref 0–100)
LIPID PROFILE,FLP: ABNORMAL
POTASSIUM SERPL-SCNC: 4.1 MMOL/L (ref 3.5–5.5)
PROT SERPL-MCNC: 7 G/DL (ref 6.4–8.2)
SODIUM SERPL-SCNC: 137 MMOL/L (ref 136–145)
TRIGL SERPL-MCNC: 127 MG/DL (ref ?–150)
VLDLC SERPL CALC-MCNC: 25.4 MG/DL

## 2022-06-03 PROCEDURE — 36415 COLL VENOUS BLD VENIPUNCTURE: CPT

## 2022-06-03 PROCEDURE — 80061 LIPID PANEL: CPT

## 2022-06-03 PROCEDURE — 80053 COMPREHEN METABOLIC PANEL: CPT

## 2022-06-10 ENCOUNTER — OFFICE VISIT (OUTPATIENT)
Dept: INTERNAL MEDICINE CLINIC | Age: 57
End: 2022-06-10
Payer: COMMERCIAL

## 2022-06-10 VITALS
HEIGHT: 77 IN | OXYGEN SATURATION: 97 % | RESPIRATION RATE: 20 BRPM | WEIGHT: 214 LBS | DIASTOLIC BLOOD PRESSURE: 89 MMHG | BODY MASS INDEX: 25.27 KG/M2 | TEMPERATURE: 97.7 F | HEART RATE: 107 BPM | SYSTOLIC BLOOD PRESSURE: 129 MMHG

## 2022-06-10 DIAGNOSIS — F10.982 ALCOHOL-INDUCED INSOMNIA (HCC): ICD-10-CM

## 2022-06-10 DIAGNOSIS — F41.9 ANXIETY: ICD-10-CM

## 2022-06-10 DIAGNOSIS — I10 ESSENTIAL HYPERTENSION: Primary | ICD-10-CM

## 2022-06-10 DIAGNOSIS — F10.180 ALCOHOL ABUSE WITH ALCOHOL-INDUCED ANXIETY DISORDER (HCC): ICD-10-CM

## 2022-06-10 DIAGNOSIS — K70.10 ALCOHOLIC HEPATITIS WITHOUT ASCITES: ICD-10-CM

## 2022-06-10 DIAGNOSIS — E78.5 DYSLIPIDEMIA: ICD-10-CM

## 2022-06-10 PROCEDURE — 99214 OFFICE O/P EST MOD 30 MIN: CPT | Performed by: INTERNAL MEDICINE

## 2022-06-10 RX ORDER — ZOLPIDEM TARTRATE 10 MG/1
10 TABLET ORAL
Qty: 30 TABLET | Refills: 3 | Status: SHIPPED | OUTPATIENT
Start: 2022-06-10

## 2022-06-10 NOTE — PATIENT INSTRUCTIONS
High Blood Pressure: Care Instructions  Overview     It's normal for blood pressure to go up and down throughout the day. But if it stays up, you have high blood pressure. Another name for high blood pressure is hypertension. Despite what a lot of people think, high blood pressure usually doesn't cause headaches or make you feel dizzy or lightheaded. It usually has no symptoms. But it does increase your risk of stroke, heart attack, and other problems. You and your doctor will talk about your risks of these problems based on your blood pressure. Your doctor will give you a goal for your blood pressure. Your goal will be based on your health and your age. Lifestyle changes, such as eating healthy and being active, are always important to help lower blood pressure. You might also take medicine to reach your blood pressure goal.  Follow-up care is a key part of your treatment and safety. Be sure to make and go to all appointments, and call your doctor if you are having problems. It's also a good idea to know your test results and keep a list of the medicines you take. How can you care for yourself at home? Medical treatment  · If you stop taking your medicine, your blood pressure will go back up. You may take one or more types of medicine to lower your blood pressure. Be safe with medicines. Take your medicine exactly as prescribed. Call your doctor if you think you are having a problem with your medicine. · Talk to your doctor before you start taking aspirin every day. Aspirin can help certain people lower their risk of a heart attack or stroke. But taking aspirin isn't right for everyone, because it can cause serious bleeding. · See your doctor regularly. You may need to see the doctor more often at first or until your blood pressure comes down. · If you are taking blood pressure medicine, talk to your doctor before you take decongestants or anti-inflammatory medicine, such as ibuprofen.  Some of these medicines can raise blood pressure. · Learn how to check your blood pressure at home. Lifestyle changes  · Stay at a healthy weight. This is especially important if you put on weight around the waist. Losing even 10 pounds can help you lower your blood pressure. · If your doctor recommends it, get more exercise. Walking is a good choice. Bit by bit, increase the amount you walk every day. Try for at least 30 minutes on most days of the week. You also may want to swim, bike, or do other activities. · Avoid or limit alcohol. Talk to your doctor about whether you can drink any alcohol. · Try to limit how much sodium you eat to less than 2,300 milligrams (mg) a day. Your doctor may ask you to try to eat less than 1,500 mg a day. · Eat plenty of fruits (such as bananas and oranges), vegetables, legumes, whole grains, and low-fat dairy products. · Lower the amount of saturated fat in your diet. Saturated fat is found in animal products such as milk, cheese, and meat. Limiting these foods may help you lose weight and also lower your risk for heart disease. · Do not smoke. Smoking increases your risk for heart attack and stroke. If you need help quitting, talk to your doctor about stop-smoking programs and medicines. These can increase your chances of quitting for good. When should you call for help? Call 911  anytime you think you may need emergency care. This may mean having symptoms that suggest that your blood pressure is causing a serious heart or blood vessel problem. Your blood pressure may be over 180/120. For example, call 911 if:    · You have symptoms of a heart attack. These may include:  ? Chest pain or pressure, or a strange feeling in the chest.  ? Sweating. ? Shortness of breath. ? Nausea or vomiting. ? Pain, pressure, or a strange feeling in the back, neck, jaw, or upper belly or in one or both shoulders or arms. ? Lightheadedness or sudden weakness.   ? A fast or irregular heartbeat.     · You have symptoms of a stroke. These may include:  ? Sudden numbness, tingling, weakness, or loss of movement in your face, arm, or leg, especially on only one side of your body. ? Sudden vision changes. ? Sudden trouble speaking. ? Sudden confusion or trouble understanding simple statements. ? Sudden problems with walking or balance. ? A sudden, severe headache that is different from past headaches.     · You have severe back or belly pain. Do not wait until your blood pressure comes down on its own. Get help right away. Call your doctor now or seek immediate care if:    · Your blood pressure is much higher than normal (such as 180/120 or higher), but you don't have symptoms.     · You think high blood pressure is causing symptoms, such as:  ? Severe headache.  ? Blurry vision. Watch closely for changes in your health, and be sure to contact your doctor if:    · Your blood pressure measures higher than your doctor recommends at least 2 times. That means the top number is higher or the bottom number is higher, or both.     · You think you may be having side effects from your blood pressure medicine. Where can you learn more? Go to http://www.gray.com/  Enter [id number] in the search box to learn more about \"High Blood Pressure: Care Instructions. \"  Current as of: January 10, 2022               Content Version: 13.2  © 2006-2022 [employer]. Care instructions adapted under license by [employer] (which disclaims liability or warranty for this information). If you have questions about a medical condition or this instruction, always ask your healthcare professional. [place] [address] any warranty or liability for your use of this information.

## 2022-06-10 NOTE — PROGRESS NOTES
Dolores Vela is a 49 y.o.  male and presents with Cholesterol Problem, Hypertension (f/u), Anxiety, and Alcohol Problem      SUBJECTIVE:    Pt's HTN is now well controlled on Toprol  mg. Pt denies any side effects from the medication. He continues to struggle with ETOH dependence. It is being to interfere with his life and is is thinking off going to Rehab. D/W him that therapy might be an option and he is also aware off AA. He has anxiety and insomnia exacerbated by the ETOH. Medications for anxiety have not worked well for him in the past. Medications for insomnia also have not worked optimally for him most likely due to the ETOH abuse. Pt has transaminates due to the ETOH abuse from recent blood work. The 10-year ASCVD risk score (Antonina Wright., et al., 2013) is: 7.4% but due to h/o tobacco use and HTN he should consider doing a Heart Scan. Pt also needs to f/u with Urology to restart testosterone for his hypogonadism which may help with his mood. Respiratory ROS: negative for - shortness of breath  Cardiovascular ROS: negative for - chest pain    Current Outpatient Medications   Medication Sig    zolpidem (AMBIEN) 10 mg tablet Take 1 Tablet by mouth nightly as needed for Sleep. Max Daily Amount: 10 mg.    metoprolol succinate (TOPROL-XL) 100 mg tablet Take 1 Tablet by mouth daily.  Syringe, Disposable, 1 mL syrg Use as directed    omeprazole (PRILOSEC) 20 mg capsule take 1 capsule by mouth twice a day    ibuprofen (MOTRIN) 800 mg tablet Take 1 Tab by mouth every eight (8) hours as needed for Pain.     Needle, Disp, 18 G 18 gauge x 1\" ndle Use as directed (Patient not taking: Reported on 6/10/2022)    Needle, Disp, 23 G 23 gauge x 1 1/2\" ndle Use as directed (Patient not taking: Reported on 6/10/2022)    testosterone cypionate (DEPOTESTOTERONE CYPIONATE) 200 mg/mL injection INJECT 1 ML INTRAMUSCULARLY ONCE EVERY 2 WEEKS (Patient not taking: Reported on 6/10/2022)     No current facility-administered medications for this visit. OBJECTIVE:  oriented to person, place, and time and crying  Visit Vitals  /89 (BP 1 Location: Left arm, BP Patient Position: Sitting, BP Cuff Size: Adult)   Pulse (!) 107   Temp 97.7 °F (36.5 °C) (Temporal)   Resp 20   Ht 6' 5\" (1.956 m)   Wt 214 lb (97.1 kg)   SpO2 97%   BMI 25.38 kg/m²      well developed and well nourished  Chest - clear to auscultation, no wheezes, rales or rhonchi, symmetric air entry  Heart - normal rate, regular rhythm, normal S1, S2, no murmurs, rubs, clicks or gallops  Extremities - peripheral pulses normal, no pedal edema, no clubbing or cyanosis        Labs:   Lab Results   Component Value Date/Time    Cholesterol, total 230 (H) 06/03/2022 11:45 AM    HDL Cholesterol 99 (H) 06/03/2022 11:45 AM    LDL, calculated 105.6 (H) 06/03/2022 11:45 AM    Triglyceride 127 06/03/2022 11:45 AM    CHOL/HDL Ratio 2.3 06/03/2022 11:45 AM     Lab Results   Component Value Date/Time    Sodium 137 06/03/2022 11:45 AM    Potassium 4.1 06/03/2022 11:45 AM    Chloride 105 06/03/2022 11:45 AM    CO2 27 06/03/2022 11:45 AM    Anion gap 5 06/03/2022 11:45 AM    Glucose 95 06/03/2022 11:45 AM    BUN 16 06/03/2022 11:45 AM    Creatinine 0.84 06/03/2022 11:45 AM    BUN/Creatinine ratio 19 06/03/2022 11:45 AM    GFR est AA >60 06/03/2022 11:45 AM    GFR est non-AA >60 06/03/2022 11:45 AM    Calcium 9.5 06/03/2022 11:45 AM    Bilirubin, total 0.4 06/03/2022 11:45 AM    ALT (SGPT) 241 (H) 06/03/2022 11:45 AM    Alk. phosphatase 45 06/03/2022 11:45 AM    Protein, total 7.0 06/03/2022 11:45 AM    Albumin 4.1 06/03/2022 11:45 AM    Globulin 2.9 06/03/2022 11:45 AM    A-G Ratio 1.4 06/03/2022 11:45 AM          Assessment/Plan      ICD-10-CM ICD-9-CM    1. Essential hypertension  I10 401.9 Well controlled on Toprol  mg METABOLIC PANEL, COMPREHENSIVE   2. Dyslipidemia  E78.5 272.4 Borderline controlled. Pt to conisder Heart Scan LIPID PANEL   3.  Anxiety F41.9 300.00 Uncontrolled. Pt given list of therapists. SSRIs have not worked well for him or he has not stayed on them in the past     4. Alcohol-induced insomnia (HCC)  F10.982 291.82 Will try zolpidem (AMBIEN) 10 mg tablet which has helped in the past without any side effects. Pt aware not to mix with ETOH and that it can cause unusual behaviors like sleep driving if he does. 5. Alcohol abuse with alcohol-induced anxiety disorder ([facility] [address]  F10.180 291.89 Pt to consider outpatient Rehab/AA and or therapy      305.00    6. Alcoholic hepatitis without ascites  K70.10 571.1 Pt aware that this can progress to cirrhosis and ETOH cessation is extremely important      Follow-up and Dispositions    · Return in about 6 months (around 12/10/2022) for labs 1 week before. Reviewed plan of care. Patient has provided input and agrees with goals.

## 2022-06-10 NOTE — PROGRESS NOTES
Patient is in the office today for a 6 month follow up. Patient would like something for anxiety. 1. \"Have you been to the ER, urgent care clinic since your last visit? Hospitalized since your last visit? \" No    2. \"Have you seen or consulted any other health care providers outside of the [address] [place] [address] since your last visit? \" No     3. For patients aged 39-70: Has the patient had a colonoscopy / FIT/ Cologuard? Yes - no Care Gap present      If the patient is female:    4. For patients aged 41-77: Has the patient had a mammogram within the past 2 years? NA - based on age or sex      11. For patients aged 21-65: Has the patient had a pap smear?  NA - based on age or sex

## 2022-07-27 DIAGNOSIS — K21.9 GASTROESOPHAGEAL REFLUX DISEASE WITHOUT ESOPHAGITIS: ICD-10-CM

## 2022-07-27 RX ORDER — OMEPRAZOLE 20 MG/1
CAPSULE, DELAYED RELEASE ORAL
Qty: 180 CAPSULE | Refills: 3 | Status: SHIPPED | OUTPATIENT
Start: 2022-07-27

## 2022-11-07 DIAGNOSIS — F10.982 ALCOHOL-INDUCED INSOMNIA (HCC): ICD-10-CM

## 2022-11-07 RX ORDER — ZOLPIDEM TARTRATE 10 MG/1
TABLET ORAL
Qty: 30 TABLET | Refills: 4 | Status: SHIPPED | OUTPATIENT
Start: 2022-11-07

## 2022-11-12 DIAGNOSIS — I10 ESSENTIAL HYPERTENSION: ICD-10-CM

## 2022-11-12 RX ORDER — METOPROLOL SUCCINATE 100 MG/1
TABLET, EXTENDED RELEASE ORAL
Qty: 90 TABLET | Refills: 1 | Status: SHIPPED | OUTPATIENT
Start: 2022-11-12

## 2022-12-09 ENCOUNTER — HOSPITAL ENCOUNTER (OUTPATIENT)
Dept: LAB | Age: 57
End: 2022-12-09
Payer: COMMERCIAL

## 2022-12-09 ENCOUNTER — APPOINTMENT (OUTPATIENT)
Dept: INTERNAL MEDICINE CLINIC | Age: 57
End: 2022-12-09

## 2022-12-09 DIAGNOSIS — E78.5 DYSLIPIDEMIA: ICD-10-CM

## 2022-12-09 DIAGNOSIS — I10 ESSENTIAL HYPERTENSION: ICD-10-CM

## 2022-12-09 LAB
ALBUMIN SERPL-MCNC: 3.9 G/DL (ref 3.4–5)
ALBUMIN/GLOB SERPL: 1.2 {RATIO} (ref 0.8–1.7)
ALP SERPL-CCNC: 47 U/L (ref 45–117)
ALT SERPL-CCNC: 48 U/L (ref 16–61)
ANION GAP SERPL CALC-SCNC: 11 MMOL/L (ref 3–18)
AST SERPL-CCNC: 27 U/L (ref 10–38)
BILIRUB SERPL-MCNC: 0.4 MG/DL (ref 0.2–1)
BUN SERPL-MCNC: 14 MG/DL (ref 7–18)
BUN/CREAT SERPL: 14 (ref 12–20)
CALCIUM SERPL-MCNC: 9 MG/DL (ref 8.5–10.1)
CHLORIDE SERPL-SCNC: 106 MMOL/L (ref 100–111)
CHOLEST SERPL-MCNC: 235 MG/DL
CO2 SERPL-SCNC: 27 MMOL/L (ref 21–32)
CREAT SERPL-MCNC: 1.01 MG/DL (ref 0.6–1.3)
GLOBULIN SER CALC-MCNC: 3.3 G/DL (ref 2–4)
GLUCOSE SERPL-MCNC: 98 MG/DL (ref 74–99)
HDLC SERPL-MCNC: 68 MG/DL (ref 40–60)
HDLC SERPL: 3.5 {RATIO} (ref 0–5)
LDLC SERPL CALC-MCNC: 110.4 MG/DL (ref 0–100)
LIPID PROFILE,FLP: ABNORMAL
POTASSIUM SERPL-SCNC: 4.2 MMOL/L (ref 3.5–5.5)
PROT SERPL-MCNC: 7.2 G/DL (ref 6.4–8.2)
SODIUM SERPL-SCNC: 144 MMOL/L (ref 136–145)
TRIGL SERPL-MCNC: 283 MG/DL (ref ?–150)
VLDLC SERPL CALC-MCNC: 56.6 MG/DL

## 2022-12-09 PROCEDURE — 80053 COMPREHEN METABOLIC PANEL: CPT

## 2022-12-09 PROCEDURE — 80061 LIPID PANEL: CPT

## 2022-12-09 PROCEDURE — 36415 COLL VENOUS BLD VENIPUNCTURE: CPT

## 2022-12-16 ENCOUNTER — OFFICE VISIT (OUTPATIENT)
Dept: INTERNAL MEDICINE CLINIC | Age: 57
End: 2022-12-16
Payer: COMMERCIAL

## 2022-12-16 VITALS
BODY MASS INDEX: 24.68 KG/M2 | RESPIRATION RATE: 20 BRPM | WEIGHT: 209 LBS | HEART RATE: 106 BPM | TEMPERATURE: 98.7 F | HEIGHT: 77 IN | OXYGEN SATURATION: 99 % | DIASTOLIC BLOOD PRESSURE: 74 MMHG | SYSTOLIC BLOOD PRESSURE: 118 MMHG

## 2022-12-16 DIAGNOSIS — E78.5 DYSLIPIDEMIA: ICD-10-CM

## 2022-12-16 DIAGNOSIS — I10 ESSENTIAL HYPERTENSION: Primary | ICD-10-CM

## 2022-12-16 DIAGNOSIS — F10.180 ALCOHOL ABUSE WITH ALCOHOL-INDUCED ANXIETY DISORDER (HCC): ICD-10-CM

## 2022-12-16 RX ORDER — IBUPROFEN 800 MG/1
800 TABLET ORAL
Qty: 90 TABLET | Refills: 2 | Status: SHIPPED | OUTPATIENT
Start: 2022-12-16

## 2022-12-16 NOTE — PROGRESS NOTES
Patient is in the office today for a 6 month follow up. 1. \"Have you been to the ER, urgent care clinic since your last visit? Hospitalized since your last visit? \" No    2. \"Have you seen or consulted any other health care providers outside of the [address] [place] [address] since your last visit? \" No     3. For patients aged 39-70: Has the patient had a colonoscopy / FIT/ Cologuard? Yes - no Care Gap present      If the patient is female:    4. For patients aged 41-77: Has the patient had a mammogram within the past 2 years? NA - based on age or sex      11. For patients aged 21-65: Has the patient had a pap smear?  NA - based on age or sex

## 2022-12-16 NOTE — PROGRESS NOTES
Elidia Crowder is a 56 y.o.  male and presents with Hypertension, Cholesterol Problem, and Anxiety (Follow up )      SUBJECTIVE:    Pt's HTN is now well controlled on Toprol  mg. Pt denies any side effects from the medication. He continues to struggle with ETOH dependence but he has significantly cut back use he tells me. His anxiety and insomnia exacerbated by the ETOH has improved. Medications for anxiety have not worked well for him in the past. [employer] [facility] has helped with his insomnia. The 10-year ASCVD risk score (Apurva FABIAN, et al., 2019) is: 9.7% but due to h/o tobacco use and HTN he should consider doing a Heart Scan. Pt is now on testosterone replacement which was started by urology. Respiratory ROS: negative for - shortness of breath  Cardiovascular ROS: negative for - chest pain    Current Outpatient Medications   Medication Sig    ibuprofen (MOTRIN) 800 mg tablet Take 1 Tablet by mouth every eight (8) hours as needed for Pain.    metoprolol succinate (TOPROL-XL) 100 mg tablet take 1 tablet by mouth once daily    zolpidem (AMBIEN) 10 mg tablet take 1 tablet by mouth at bedtime if needed for sleep    testosterone cypionate (DEPOTESTOTERONE CYPIONATE) 200 mg/mL injection INJECT 1 ML INTRAMUSCULARLY ONCE EVERY 2 WEEKS    Needle, Disp, 23 G 23 gauge x 1 1/2\" ndle Use as directed    Needle, Disp, 18 G 18 gauge x 1\" ndle Use as directed    omeprazole (PRILOSEC) 20 mg capsule take 1 capsule by mouth twice a day    Syringe, Disposable, 1 mL syrg Use as directed     No current facility-administered medications for this visit.          OBJECTIVE:  oriented to person, place, and time and crying  Visit Vitals  /74 (BP 1 Location: Right arm, BP Patient Position: Sitting, BP Cuff Size: Adult)   Pulse (!) 106   Temp 98.7 °F (37.1 °C) (Temporal)   Resp 20   Ht 6' 5\" (1.956 m)   Wt 209 lb (94.8 kg)   SpO2 99%   BMI 24.78 kg/m²      well developed and well nourished  Chest - clear to auscultation, no wheezes, rales or rhonchi, symmetric air entry  Heart - normal rate, regular rhythm, normal S1, S2, no murmurs, rubs, clicks or gallops  Extremities - peripheral pulses normal, no pedal edema, no clubbing or cyanosis        Labs:   Lab Results   Component Value Date/Time    Cholesterol, total 235 (H) 12/09/2022 08:19 AM    HDL Cholesterol 68 (H) 12/09/2022 08:19 AM    LDL, calculated 110.4 (H) 12/09/2022 08:19 AM    Triglyceride 283 (H) 12/09/2022 08:19 AM    CHOL/HDL Ratio 3.5 12/09/2022 08:19 AM     Lab Results   Component Value Date/Time    Sodium 144 12/09/2022 08:19 AM    Potassium 4.2 12/09/2022 08:19 AM    Chloride 106 12/09/2022 08:19 AM    CO2 27 12/09/2022 08:19 AM    Anion gap 11 12/09/2022 08:19 AM    Glucose 98 12/09/2022 08:19 AM    BUN 14 12/09/2022 08:19 AM    Creatinine 1.01 12/09/2022 08:19 AM    BUN/Creatinine ratio 14 12/09/2022 08:19 AM    GFR est AA >60 06/03/2022 11:45 AM    GFR est non-AA >60 06/03/2022 11:45 AM    Calcium 9.0 12/09/2022 08:19 AM    Bilirubin, total 0.4 12/09/2022 08:19 AM    ALT (SGPT) 48 12/09/2022 08:19 AM    Alk. phosphatase 47 12/09/2022 08:19 AM    Protein, total 7.2 12/09/2022 08:19 AM    Albumin 3.9 12/09/2022 08:19 AM    Globulin 3.3 12/09/2022 08:19 AM    A-G Ratio 1.2 12/09/2022 08:19 AM          Assessment/Plan      ICD-10-CM ICD-9-CM    1. Essential hypertension  I10 401.9 Well-controlled on Toprol- mg daily METABOLIC PANEL, COMPREHENSIVE      2. Dyslipidemia  E78.5 272.4 We will try and improve with cutting back more sugar and carbs and decreasing alcohol use LIPID PANEL      3. Alcohol abuse with alcohol-induced anxiety disorder ([facility] [address]  F10.180 291.89 Patient trying to cut back alcohol use on his own.     305.00             Follow-up and Dispositions    Return in 6 months (on 6/16/2023) for labs 1 week before. Reviewed plan of care. Patient has provided input and agrees with goals.

## 2023-02-04 DIAGNOSIS — I10 ESSENTIAL HYPERTENSION: Primary | ICD-10-CM

## 2023-02-04 DIAGNOSIS — E78.5 DYSLIPIDEMIA: Primary | ICD-10-CM

## 2023-03-03 ENCOUNTER — OFFICE VISIT (OUTPATIENT)
Age: 58
End: 2023-03-03
Payer: COMMERCIAL

## 2023-03-03 VITALS
HEART RATE: 69 BPM | RESPIRATION RATE: 20 BRPM | OXYGEN SATURATION: 98 % | TEMPERATURE: 98.7 F | DIASTOLIC BLOOD PRESSURE: 94 MMHG | BODY MASS INDEX: 26.45 KG/M2 | HEIGHT: 77 IN | SYSTOLIC BLOOD PRESSURE: 148 MMHG | WEIGHT: 224 LBS

## 2023-03-03 DIAGNOSIS — F10.180 ALCOHOL ABUSE WITH ALCOHOL-INDUCED ANXIETY DISORDER (HCC): ICD-10-CM

## 2023-03-03 DIAGNOSIS — I10 ESSENTIAL (PRIMARY) HYPERTENSION: Primary | ICD-10-CM

## 2023-03-03 PROCEDURE — 3078F DIAST BP <80 MM HG: CPT | Performed by: INTERNAL MEDICINE

## 2023-03-03 PROCEDURE — 99213 OFFICE O/P EST LOW 20 MIN: CPT | Performed by: INTERNAL MEDICINE

## 2023-03-03 PROCEDURE — 3074F SYST BP LT 130 MM HG: CPT | Performed by: INTERNAL MEDICINE

## 2023-03-03 RX ORDER — VALSARTAN 160 MG/1
160 TABLET ORAL DAILY
Qty: 90 TABLET | Refills: 1 | Status: SHIPPED | OUTPATIENT
Start: 2023-03-03

## 2023-03-03 SDOH — ECONOMIC STABILITY: HOUSING INSECURITY
IN THE LAST 12 MONTHS, WAS THERE A TIME WHEN YOU DID NOT HAVE A STEADY PLACE TO SLEEP OR SLEPT IN A SHELTER (INCLUDING NOW)?: PATIENT REFUSED

## 2023-03-03 SDOH — ECONOMIC STABILITY: FOOD INSECURITY: WITHIN THE PAST 12 MONTHS, YOU WORRIED THAT YOUR FOOD WOULD RUN OUT BEFORE YOU GOT MONEY TO BUY MORE.: PATIENT DECLINED

## 2023-03-03 SDOH — ECONOMIC STABILITY: INCOME INSECURITY: HOW HARD IS IT FOR YOU TO PAY FOR THE VERY BASICS LIKE FOOD, HOUSING, MEDICAL CARE, AND HEATING?: PATIENT DECLINED

## 2023-03-03 SDOH — ECONOMIC STABILITY: FOOD INSECURITY: WITHIN THE PAST 12 MONTHS, THE FOOD YOU BOUGHT JUST DIDN'T LAST AND YOU DIDN'T HAVE MONEY TO GET MORE.: PATIENT DECLINED

## 2023-03-03 ASSESSMENT — PATIENT HEALTH QUESTIONNAIRE - PHQ9
2. FEELING DOWN, DEPRESSED OR HOPELESS: 0
SUM OF ALL RESPONSES TO PHQ QUESTIONS 1-9: 0
SUM OF ALL RESPONSES TO PHQ9 QUESTIONS 1 & 2: 0
1. LITTLE INTEREST OR PLEASURE IN DOING THINGS: 0
SUM OF ALL RESPONSES TO PHQ QUESTIONS 1-9: 0

## 2023-03-03 NOTE — PROGRESS NOTES
Paulon Brad presents today for   Chief Complaint   Patient presents with    Headache    Hypertension     Patient states he has had a headache for about 1 month and BP has been all over the place. 1. \"Have you been to the ER, urgent care clinic since your last visit? Hospitalized since your last visit? \" no    2. \"Have you seen or consulted any other health care providers outside of the [address] [place] [address] since your last visit? \" no     3. For patients aged 39-70: Has the patient had a colonoscopy / FIT/ Cologuard? Yes - no Care Gap present      If the patient is female:    4. For patients aged 41-77: Has the patient had a mammogram within the past 2 years? NA - based on age or sex      11. For patients aged 21-65: Has the patient had a pap smear?  NA - based on age or sex

## 2023-03-09 NOTE — PROGRESS NOTES
John Stout (:  1965) is a 56 y.o. male,Established patient, here for evaluation of the following chief complaint(s):  Headache and Hypertension         ASSESSMENT/PLAN:    ICD-10-CM    1. Essential (primary) hypertension  I10 Uncontrolled Toprol- mg. We will add valsartan (DIOVAN) 160 MG tablet and titrate up as needed to better control blood pressure. 2. Alcohol abuse with alcohol-induced anxiety disorder ([facility] [address]  F10.180 Patient will continue to work on trying to cut back alcohol use. Return in about 4 weeks (around 3/31/2023) for BP Check. Subjective   SUBJECTIVE/OBJECTIVE:  Patient has high blood pressure that is uncontrolled and probably exacerbated by his history of chronic alcohol use. He is currently on Toprol- mg daily and he on his own increase it to twice a day to control his blood pressure better. He continues to work on trying to cut back alcohol use which has been exacerbated due to his increased life stresses with his son who is totally handicapped. Headache  Hypertension  Associated symptoms include headaches. Review of Systems   Neurological:  Positive for headaches.         Objective   Physical Exam    Visit Vitals  BP (!) 148/94   Pulse 69   Temp 98.7 °F (37.1 °C) (Temporal)   Resp 20   Ht 6' 5\" (1.956 m)   Wt 224 lb (101.6 kg)   SpO2 98%   BMI 26.56 kg/m²          Current Outpatient Medications   Medication Sig    ibuprofen (ADVIL;MOTRIN) 800 MG tablet Take 800 mg by mouth every 8 hours as needed    metoprolol succinate (TOPROL XL) 100 MG extended release tablet take 1 tablet by mouth once daily    omeprazole (PRILOSEC) 20 MG delayed release capsule take 1 capsule by mouth twice a day    testosterone cypionate (DEPOTESTOTERONE CYPIONATE) 200 MG/ML injection INJECT 1 ML INTRAMUSCULARLY ONCE EVERY 2 WEEKS    zolpidem (AMBIEN) 10 MG tablet take 1 tablet by mouth at bedtime if needed for sleep    valsartan (DIOVAN) 160 MG tablet Take 1 tablet by mouth daily     No current facility-administered medications for this visit.              An electronic signature was used to authenticate this note.    --Mauricio Osman MD

## 2023-03-29 PROBLEM — M47.812 CERVICAL SPONDYLOSIS WITHOUT MYELOPATHY: Status: ACTIVE | Noted: 2023-03-29

## 2023-03-29 PROBLEM — M46.92 CERVICAL SPONDYLITIS (HCC): Status: ACTIVE | Noted: 2023-03-29

## 2023-03-29 PROBLEM — G89.29 CHRONIC PAIN: Status: ACTIVE | Noted: 2023-03-29

## 2023-03-29 PROBLEM — M50.90 CERVICAL DISC DISEASE: Status: ACTIVE | Noted: 2023-03-29

## 2023-03-29 PROBLEM — M50.30 DEGENERATION OF CERVICAL INTERVERTEBRAL DISC: Status: ACTIVE | Noted: 2023-03-29

## 2023-04-04 ENCOUNTER — OFFICE VISIT (OUTPATIENT)
Age: 58
End: 2023-04-04
Payer: COMMERCIAL

## 2023-04-04 VITALS
RESPIRATION RATE: 20 BRPM | TEMPERATURE: 98.6 F | OXYGEN SATURATION: 98 % | HEART RATE: 76 BPM | DIASTOLIC BLOOD PRESSURE: 88 MMHG | WEIGHT: 238 LBS | BODY MASS INDEX: 28.1 KG/M2 | SYSTOLIC BLOOD PRESSURE: 126 MMHG | HEIGHT: 77 IN

## 2023-04-04 DIAGNOSIS — J30.1 SEASONAL ALLERGIC RHINITIS DUE TO POLLEN: ICD-10-CM

## 2023-04-04 DIAGNOSIS — I10 ESSENTIAL (PRIMARY) HYPERTENSION: Primary | ICD-10-CM

## 2023-04-04 PROCEDURE — 3074F SYST BP LT 130 MM HG: CPT | Performed by: INTERNAL MEDICINE

## 2023-04-04 PROCEDURE — 3078F DIAST BP <80 MM HG: CPT | Performed by: INTERNAL MEDICINE

## 2023-04-04 PROCEDURE — 99213 OFFICE O/P EST LOW 20 MIN: CPT | Performed by: INTERNAL MEDICINE

## 2023-04-04 RX ORDER — VALSARTAN 160 MG/1
160 TABLET ORAL DAILY
Qty: 90 TABLET | Refills: 1 | COMMUNITY
Start: 2023-04-04

## 2023-04-04 RX ORDER — FLUTICASONE PROPIONATE 50 MCG
2 SPRAY, SUSPENSION (ML) NASAL DAILY
Qty: 1 G | Refills: 5 | Status: SHIPPED | OUTPATIENT
Start: 2023-04-04

## 2023-04-04 RX ORDER — AZELASTINE 1 MG/ML
2 SPRAY, METERED NASAL 2 TIMES DAILY
Qty: 60 ML | Refills: 5 | Status: SHIPPED | OUTPATIENT
Start: 2023-04-04

## 2023-04-08 ASSESSMENT — ENCOUNTER SYMPTOMS: RESPIRATORY NEGATIVE: 1

## 2023-04-08 NOTE — PROGRESS NOTES
Kojo Flores presents today for   Chief Complaint   Patient presents with    Blood Pressure Check     4 week follow up     Allergies     Refill nasal spray           1. \"Have you been to the ER, urgent care clinic since your last visit? Hospitalized since your last visit? \" no    2. \"Have you seen or consulted any other health care providers outside of the [address] [place] [address] since your last visit? \" no     3. For patients aged 39-70: Has the patient had a colonoscopy / FIT/ Cologuard? Yes - no Care Gap present      If the patient is female:    4. For patients aged 41-77: Has the patient had a mammogram within the past 2 years? NA - based on age or sex      11. For patients aged 21-65: Has the patient had a pap smear?  NA - based on age or sex
tablet by mouth once daily    omeprazole (PRILOSEC) 20 MG delayed release capsule take 1 capsule by mouth twice a day    zolpidem (AMBIEN) 10 MG tablet take 1 tablet by mouth at bedtime if needed for sleep     No current facility-administered medications for this visit.                An electronic signature was used to authenticate this note.    --Kvng Best MD

## 2023-05-05 RX ORDER — METOPROLOL SUCCINATE 100 MG/1
TABLET, EXTENDED RELEASE ORAL
Qty: 90 TABLET | Refills: 2 | Status: SHIPPED | OUTPATIENT
Start: 2023-05-05

## 2023-05-22 RX ORDER — METOPROLOL SUCCINATE 100 MG/1
TABLET, EXTENDED RELEASE ORAL
Qty: 90 TABLET | Refills: 2 | Status: SHIPPED | OUTPATIENT
Start: 2023-05-22

## 2023-06-01 DIAGNOSIS — F51.01 PRIMARY INSOMNIA: Primary | ICD-10-CM

## 2023-06-01 RX ORDER — ZOLPIDEM TARTRATE 10 MG/1
TABLET ORAL
Qty: 30 TABLET | Refills: 4 | Status: SHIPPED | OUTPATIENT
Start: 2023-06-01 | End: 2023-07-01

## 2023-06-01 NOTE — TELEPHONE ENCOUNTER
[facility] 6[facility]1/2023    The last fill date was 4/28/2023 for a 30 d/s qty 30      Last UDS: not on file     CSA Last signed: not on file         PCP: Daryl Garcia MD      Future Appointments   Date Time Provider [address]   6/2/2023  8:35 AM [facility] LAB VISIT IO BS AMB   6/16/2023  8:00 AM Daryl Garcia MD [facility] BS AMB   9/21/2023  2:00 PM [facility] NURSE [facility]   9/28/2023  9:15 AM uJju Love MD [facility] Elizabethtay Claudette       Requested Prescriptions     Pending Prescriptions Disp Refills    zolpidem (AMBIEN) 10 MG tablet [Pharmacy Med Name: ZOLPIDEM TARTRATE 10 MG TABLET] 30 tablet 0     Sig: take 1 tablet by mouth at bedtime if needed for sleep

## 2023-06-02 ENCOUNTER — HOSPITAL ENCOUNTER (OUTPATIENT)
Facility: HOSPITAL | Age: 58
Setting detail: SPECIMEN
End: 2023-06-02
Payer: COMMERCIAL

## 2023-06-02 DIAGNOSIS — I10 ESSENTIAL (PRIMARY) HYPERTENSION: ICD-10-CM

## 2023-06-02 LAB
ALBUMIN SERPL-MCNC: 3.8 G/DL (ref 3.4–5)
ALBUMIN/GLOB SERPL: 1.4 (ref 0.8–1.7)
ALP SERPL-CCNC: 47 U/L (ref 45–117)
ALT SERPL-CCNC: 145 U/L (ref 16–61)
ANION GAP SERPL CALC-SCNC: 6 MMOL/L (ref 3–18)
AST SERPL-CCNC: 132 U/L (ref 10–38)
BILIRUB SERPL-MCNC: 0.8 MG/DL (ref 0.2–1)
BUN SERPL-MCNC: 17 MG/DL (ref 7–18)
BUN/CREAT SERPL: 21 (ref 12–20)
CALCIUM SERPL-MCNC: 9.1 MG/DL (ref 8.5–10.1)
CHLORIDE SERPL-SCNC: 106 MMOL/L (ref 100–111)
CHOLEST SERPL-MCNC: 194 MG/DL
CO2 SERPL-SCNC: 25 MMOL/L (ref 21–32)
CREAT SERPL-MCNC: 0.82 MG/DL (ref 0.6–1.3)
GLOBULIN SER CALC-MCNC: 2.8 G/DL (ref 2–4)
GLUCOSE SERPL-MCNC: 143 MG/DL (ref 74–99)
HDLC SERPL-MCNC: 78 MG/DL (ref 40–60)
HDLC SERPL: 2.5 (ref 0–5)
LDLC SERPL CALC-MCNC: 91 MG/DL (ref 0–100)
LIPID PANEL: ABNORMAL
POTASSIUM SERPL-SCNC: 3.8 MMOL/L (ref 3.5–5.5)
PROT SERPL-MCNC: 6.6 G/DL (ref 6.4–8.2)
SODIUM SERPL-SCNC: 137 MMOL/L (ref 136–145)
TRIGL SERPL-MCNC: 125 MG/DL
VLDLC SERPL CALC-MCNC: 25 MG/DL

## 2023-06-02 PROCEDURE — 80053 COMPREHEN METABOLIC PANEL: CPT

## 2023-06-02 PROCEDURE — 80061 LIPID PANEL: CPT

## 2023-06-02 PROCEDURE — 36415 COLL VENOUS BLD VENIPUNCTURE: CPT

## 2023-08-15 DIAGNOSIS — I10 ESSENTIAL (PRIMARY) HYPERTENSION: ICD-10-CM

## 2023-08-16 RX ORDER — OMEPRAZOLE 20 MG/1
CAPSULE, DELAYED RELEASE ORAL
Qty: 180 CAPSULE | Refills: 1 | Status: SHIPPED | OUTPATIENT
Start: 2023-08-16

## 2023-08-16 RX ORDER — VALSARTAN 160 MG/1
TABLET ORAL
Qty: 90 TABLET | Refills: 1 | Status: SHIPPED | OUTPATIENT
Start: 2023-08-16

## 2023-08-16 NOTE — TELEPHONE ENCOUNTER
PCP:  MEJIA GARDUNO MD    Last refill per chart:  valsartan (DIOVAN) 160 MG tablet 160 mg, Oral, DAILY Edit       Summary: Take 1 tablet by mouth daily   Dose, Frequency: 160 mg, DAILY  Start: 4/4/2023  Ord/Sold: 4/4/2023 (O)  Report  Taking:   Long-term:   Med Dose History       Patient Sig: Take 1 tablet by mouth daily       Ordered on: 4/4/2023       Authorized by: MEJIA Negro       Dispense: 90 tablet       Refills: 1 ordered        omeprazole (PRILOSEC) 20 MG delayed release capsule  Edit       Summary: take 1 capsule by mouth twice a day   Start: 7/27/2022  Ord/Sold: 3/3/2023 (O)  Report  Taking:   Long-term:   Med Dose History       Patient Sig: take 1 capsule by mouth twice a day       Ordered on: 3/3/2023        Future Appointments   Date Time Provider [address] [place] [address]   9/21/2023  2:00 PM [facility] NURSE Annmarie Ortega Sched   9/28/2023  9:15 AM MD Annmarie Rodriguez Sched   12/15/2023  7:55 AM [facility] LAB VISIT IO BS AMB   12/22/2023  8:00 AM Jen Cardenas MD IO BS AMB

## 2023-08-25 RX ORDER — IBUPROFEN 800 MG/1
TABLET ORAL
Qty: 90 TABLET | Refills: 2 | Status: SHIPPED | OUTPATIENT
Start: 2023-08-25

## 2023-11-12 DIAGNOSIS — F51.01 PRIMARY INSOMNIA: ICD-10-CM

## 2023-11-13 RX ORDER — ZOLPIDEM TARTRATE 10 MG/1
TABLET ORAL
Qty: 30 TABLET | Refills: 5 | Status: SHIPPED | OUTPATIENT
Start: 2023-11-13 | End: 2023-12-13

## 2023-11-13 NOTE — TELEPHONE ENCOUNTER
Please refill if appropriate or refuse medication if not appropriate.    Last UDS: not on file    CSA Last signed: not on file     needs to be reviewed     Last refill: 6/1/2023      PCP: Oli Jaramillo MD    Future Appointments   Date Time Provider Department Center   12/15/2023  8:00 AM [facility] LAB VISIT IO BS AMB   12/22/2023  8:00 AM Oli Jaramillo MD IO BS AMB       Requested Prescriptions     Pending Prescriptions Disp Refills    zolpidem (AMBIEN) 10 MG tablet [Pharmacy Med Name: ZOLPIDEM TARTRATE 10 MG TABLET] 30 tablet      Sig: take 1 tablet by mouth at bedtime if needed for sleep

## 2023-12-26 ENCOUNTER — HOSPITAL ENCOUNTER (OUTPATIENT)
Facility: HOSPITAL | Age: 58
Discharge: HOME | End: 2023-12-29
Attending: INTERNAL MEDICINE

## 2023-12-26 DIAGNOSIS — Z00.00 PREVENTATIVE HEALTH CARE: ICD-10-CM

## 2023-12-26 PROCEDURE — 75571 CT HRT W/O DYE W/CA TEST: CPT

## 2024-01-03 ENCOUNTER — TELEPHONE (OUTPATIENT)
Age: 59
End: 2024-01-03

## 2024-01-03 NOTE — TELEPHONE ENCOUNTER
----- Message from Oli Jaramillo MD sent at 1/2/2024  8:48 PM EST -----  Please notify patient that their Heart Scan was good

## 2024-01-26 ENCOUNTER — HOSPITAL ENCOUNTER (OUTPATIENT)
Facility: HOSPITAL | Age: 59
Discharge: HOME | End: 2024-01-26
Payer: COMMERCIAL

## 2024-01-26 DIAGNOSIS — E29.1 HYPOGONADISM IN MALE: ICD-10-CM

## 2024-01-26 LAB
ALBUMIN SERPL-MCNC: 3.7 G/DL (ref 3.4–5)
ALBUMIN/GLOB SERPL: 1.3 (ref 0.8–1.7)
ALP SERPL-CCNC: 54 U/L (ref 45–117)
ALT SERPL-CCNC: 32 U/L (ref 16–61)
AST SERPL-CCNC: 15 U/L (ref 10–38)
BILIRUB DIRECT SERPL-MCNC: 0.2 MG/DL (ref 0–0.2)
BILIRUB SERPL-MCNC: 0.6 MG/DL (ref 0.2–1)
GLOBULIN SER CALC-MCNC: 2.9 G/DL (ref 2–4)
HCT VFR BLD AUTO: 45 % (ref 36–48)
HGB BLD-MCNC: 15.2 G/DL (ref 13–16)
PROT SERPL-MCNC: 6.6 G/DL (ref 6.4–8.2)
PSA SERPL-MCNC: 0.5 NG/ML (ref 0–4)

## 2024-01-26 PROCEDURE — 85018 HEMOGLOBIN: CPT

## 2024-01-26 PROCEDURE — 36415 COLL VENOUS BLD VENIPUNCTURE: CPT

## 2024-01-26 PROCEDURE — 84153 ASSAY OF PSA TOTAL: CPT

## 2024-01-26 PROCEDURE — 84403 ASSAY OF TOTAL TESTOSTERONE: CPT

## 2024-01-26 PROCEDURE — 85014 HEMATOCRIT: CPT

## 2024-01-26 PROCEDURE — 80076 HEPATIC FUNCTION PANEL: CPT

## 2024-01-27 LAB — TESTOST SERPL-MCNC: 292 NG/DL (ref 264–916)

## 2024-12-17 ENCOUNTER — HOSPITAL ENCOUNTER (OUTPATIENT)
Facility: HOSPITAL | Age: 59
Setting detail: SPECIMEN
Discharge: HOME | End: 2024-12-20
Payer: COMMERCIAL

## 2024-12-17 DIAGNOSIS — Z00.00 PHYSICAL EXAM: ICD-10-CM

## 2024-12-17 LAB
ALBUMIN SERPL-MCNC: 3.9 G/DL (ref 3.4–5)
ALBUMIN/GLOB SERPL: 1.3 (ref 0.8–1.7)
ALP SERPL-CCNC: 74 U/L (ref 45–117)
ALT SERPL-CCNC: 35 U/L (ref 16–61)
ANION GAP SERPL CALC-SCNC: 4 MMOL/L (ref 3–18)
AST SERPL-CCNC: 18 U/L (ref 10–38)
BASOPHILS # BLD: 0.1 K/UL (ref 0–0.1)
BASOPHILS NFR BLD: 1 % (ref 0–2)
BILIRUB SERPL-MCNC: 0.6 MG/DL (ref 0.2–1)
BUN SERPL-MCNC: 12 MG/DL (ref 7–18)
BUN/CREAT SERPL: 13 (ref 12–20)
CALCIUM SERPL-MCNC: 9 MG/DL (ref 8.5–10.1)
CHLORIDE SERPL-SCNC: 107 MMOL/L (ref 100–111)
CHOLEST SERPL-MCNC: 216 MG/DL
CO2 SERPL-SCNC: 27 MMOL/L (ref 21–32)
CREAT SERPL-MCNC: 0.92 MG/DL (ref 0.6–1.3)
DIFFERENTIAL METHOD BLD: NORMAL
EOSINOPHIL # BLD: 0.2 K/UL (ref 0–0.4)
EOSINOPHIL NFR BLD: 3 % (ref 0–5)
ERYTHROCYTE [DISTWIDTH] IN BLOOD BY AUTOMATED COUNT: 12.5 % (ref 11.6–14.5)
GLOBULIN SER CALC-MCNC: 2.9 G/DL (ref 2–4)
GLUCOSE SERPL-MCNC: 159 MG/DL (ref 74–99)
HCT VFR BLD AUTO: 47.3 % (ref 36–48)
HDLC SERPL-MCNC: 49 MG/DL (ref 40–60)
HDLC SERPL: 4.4 (ref 0–5)
HGB BLD-MCNC: 15.3 G/DL (ref 13–16)
IMM GRANULOCYTES # BLD AUTO: 0 K/UL (ref 0–0.04)
IMM GRANULOCYTES NFR BLD AUTO: 0 % (ref 0–0.5)
LDLC SERPL CALC-MCNC: 133.6 MG/DL (ref 0–100)
LIPID PANEL: ABNORMAL
LYMPHOCYTES # BLD: 2.2 K/UL (ref 0.9–3.6)
LYMPHOCYTES NFR BLD: 30 % (ref 21–52)
MCH RBC QN AUTO: 30.3 PG (ref 24–34)
MCHC RBC AUTO-ENTMCNC: 32.3 G/DL (ref 31–37)
MCV RBC AUTO: 93.7 FL (ref 78–100)
MONOCYTES # BLD: 0.6 K/UL (ref 0.05–1.2)
MONOCYTES NFR BLD: 8 % (ref 3–10)
NEUTS SEG # BLD: 4.3 K/UL (ref 1.8–8)
NEUTS SEG NFR BLD: 58 % (ref 40–73)
NRBC # BLD: 0 K/UL (ref 0–0.01)
NRBC BLD-RTO: 0 PER 100 WBC
PLATELET # BLD AUTO: 235 K/UL (ref 135–420)
PMV BLD AUTO: 11 FL (ref 9.2–11.8)
POTASSIUM SERPL-SCNC: 4.3 MMOL/L (ref 3.5–5.5)
PROT SERPL-MCNC: 6.8 G/DL (ref 6.4–8.2)
PSA SERPL-MCNC: 0.7 NG/ML (ref 0–4)
RBC # BLD AUTO: 5.05 M/UL (ref 4.35–5.65)
SODIUM SERPL-SCNC: 138 MMOL/L (ref 136–145)
TRIGL SERPL-MCNC: 167 MG/DL
VLDLC SERPL CALC-MCNC: 33.4 MG/DL
WBC # BLD AUTO: 7.4 K/UL (ref 4.6–13.2)

## 2024-12-17 PROCEDURE — 80053 COMPREHEN METABOLIC PANEL: CPT

## 2024-12-17 PROCEDURE — 85025 COMPLETE CBC W/AUTO DIFF WBC: CPT

## 2024-12-17 PROCEDURE — 36415 COLL VENOUS BLD VENIPUNCTURE: CPT

## 2024-12-17 PROCEDURE — G0103 PSA SCREENING: HCPCS

## 2024-12-17 PROCEDURE — 80061 LIPID PANEL: CPT

## 2024-12-23 ENCOUNTER — OFFICE VISIT (OUTPATIENT)
Facility: CLINIC | Age: 59
End: 2024-12-23
Payer: COMMERCIAL

## 2024-12-23 VITALS
SYSTOLIC BLOOD PRESSURE: 118 MMHG | HEIGHT: 77 IN | WEIGHT: 266 LBS | TEMPERATURE: 98.4 F | HEART RATE: 97 BPM | DIASTOLIC BLOOD PRESSURE: 73 MMHG | OXYGEN SATURATION: 99 % | BODY MASS INDEX: 31.41 KG/M2 | RESPIRATION RATE: 18 BRPM

## 2024-12-23 DIAGNOSIS — M79.642 PAIN IN BOTH HANDS: ICD-10-CM

## 2024-12-23 DIAGNOSIS — E78.2 MIXED HYPERLIPIDEMIA: ICD-10-CM

## 2024-12-23 DIAGNOSIS — G56.03 BILATERAL CARPAL TUNNEL SYNDROME: ICD-10-CM

## 2024-12-23 DIAGNOSIS — I10 ESSENTIAL (PRIMARY) HYPERTENSION: ICD-10-CM

## 2024-12-23 DIAGNOSIS — G89.29 CHRONIC RIGHT-SIDED LOW BACK PAIN WITH RIGHT-SIDED SCIATICA: ICD-10-CM

## 2024-12-23 DIAGNOSIS — M54.41 CHRONIC RIGHT-SIDED LOW BACK PAIN WITH RIGHT-SIDED SCIATICA: ICD-10-CM

## 2024-12-23 DIAGNOSIS — M79.641 PAIN IN BOTH HANDS: ICD-10-CM

## 2024-12-23 DIAGNOSIS — Z00.00 ENCOUNTER FOR WELL ADULT EXAM WITHOUT ABNORMAL FINDINGS: Primary | ICD-10-CM

## 2024-12-23 DIAGNOSIS — R73.9 ELEVATED BLOOD SUGAR: ICD-10-CM

## 2024-12-23 DIAGNOSIS — Z12.11 COLON CANCER SCREENING: ICD-10-CM

## 2024-12-23 PROBLEM — F10.180 ALCOHOL ABUSE WITH ALCOHOL-INDUCED ANXIETY DISORDER (HCC): Status: RESOLVED | Noted: 2023-03-03 | Resolved: 2024-12-23

## 2024-12-23 PROBLEM — M46.92 CERVICAL SPONDYLITIS (HCC): Status: RESOLVED | Noted: 2023-03-29 | Resolved: 2024-12-23

## 2024-12-23 PROCEDURE — 3074F SYST BP LT 130 MM HG: CPT | Performed by: INTERNAL MEDICINE

## 2024-12-23 PROCEDURE — 3078F DIAST BP <80 MM HG: CPT | Performed by: INTERNAL MEDICINE

## 2024-12-23 PROCEDURE — 99396 PREV VISIT EST AGE 40-64: CPT | Performed by: INTERNAL MEDICINE

## 2024-12-23 RX ORDER — PREDNISONE 10 MG/1
TABLET ORAL
Qty: 1 EACH | Refills: 0 | Status: SHIPPED | OUTPATIENT
Start: 2024-12-23

## 2024-12-23 RX ORDER — IBUPROFEN 800 MG/1
800 TABLET, FILM COATED ORAL EVERY 8 HOURS PRN
Qty: 90 TABLET | Refills: 5 | Status: SHIPPED | OUTPATIENT
Start: 2024-12-23

## 2024-12-23 SDOH — ECONOMIC STABILITY: FOOD INSECURITY: WITHIN THE PAST 12 MONTHS, YOU WORRIED THAT YOUR FOOD WOULD RUN OUT BEFORE YOU GOT MONEY TO BUY MORE.: NEVER TRUE

## 2024-12-23 SDOH — ECONOMIC STABILITY: INCOME INSECURITY: HOW HARD IS IT FOR YOU TO PAY FOR THE VERY BASICS LIKE FOOD, HOUSING, MEDICAL CARE, AND HEATING?: NOT HARD AT ALL

## 2024-12-23 SDOH — ECONOMIC STABILITY: FOOD INSECURITY: WITHIN THE PAST 12 MONTHS, THE FOOD YOU BOUGHT JUST DIDN'T LAST AND YOU DIDN'T HAVE MONEY TO GET MORE.: NEVER TRUE

## 2024-12-23 ASSESSMENT — ENCOUNTER SYMPTOMS
BACK PAIN: 1
ABDOMINAL PAIN: 0
SHORTNESS OF BREATH: 0

## 2024-12-23 ASSESSMENT — PATIENT HEALTH QUESTIONNAIRE - PHQ9
SUM OF ALL RESPONSES TO PHQ QUESTIONS 1-9: 0
2. FEELING DOWN, DEPRESSED OR HOPELESS: NOT AT ALL
1. LITTLE INTEREST OR PLEASURE IN DOING THINGS: NOT AT ALL
SUM OF ALL RESPONSES TO PHQ QUESTIONS 1-9: 0
SUM OF ALL RESPONSES TO PHQ9 QUESTIONS 1 & 2: 0

## 2024-12-23 NOTE — PROGRESS NOTES
Alok OBI Winters presents today for   Chief Complaint   Patient presents with    Annual Exam    Results     Labs           \"Have you been to the ER, urgent care clinic since your last visit?  Hospitalized since your last visit?\"    NO    “Have you seen or consulted any other health care providers outside of [facility] since your last visit?”    NO    “Have you had a colorectal cancer screening such as a colonoscopy/FIT/Cologuard?    NO    No colonoscopy on file  Date of last Cologuard: 3/19/2021  No FIT/FOBT on file   No flexible sigmoidoscopy on file             
vaccine (1) 08/01/2024    COVID-19 Vaccine (1 - 2023-24 season) Never done    Depression Screen  12/22/2024      Recommendations for Preventive Services Due: see orders and patient instructions/AVS.

## 2025-01-02 ENCOUNTER — OFFICE VISIT (OUTPATIENT)
Age: 60
End: 2025-01-02

## 2025-01-02 VITALS — WEIGHT: 266.6 LBS | HEIGHT: 77 IN | BODY MASS INDEX: 31.48 KG/M2

## 2025-01-02 DIAGNOSIS — M65.331 TRIGGER MIDDLE FINGER OF RIGHT HAND: ICD-10-CM

## 2025-01-02 DIAGNOSIS — M50.123 DISORDER OF INTERVERTEBRAL DISC AT C6-C7 LEVEL WITH RADICULOPATHY: ICD-10-CM

## 2025-01-02 DIAGNOSIS — M18.11 PRIMARY OSTEOARTHRITIS OF FIRST CARPOMETACARPAL JOINT OF RIGHT HAND: ICD-10-CM

## 2025-01-02 DIAGNOSIS — M18.12 PRIMARY OSTEOARTHRITIS OF FIRST CARPOMETACARPAL JOINT OF LEFT HAND: Primary | ICD-10-CM

## 2025-01-02 DIAGNOSIS — G56.03 BILATERAL CARPAL TUNNEL SYNDROME: ICD-10-CM

## 2025-01-02 RX ORDER — MELOXICAM 15 MG/1
15 TABLET ORAL DAILY
Qty: 30 TABLET | Refills: 0 | Status: SHIPPED | OUTPATIENT
Start: 2025-01-02

## 2025-01-02 RX ORDER — LIDOCAINE HYDROCHLORIDE 10 MG/ML
1 INJECTION, SOLUTION INFILTRATION; PERINEURAL ONCE
Status: SHIPPED | OUTPATIENT
Start: 2025-01-02

## 2025-01-02 NOTE — PROGRESS NOTES
Alok Winters is a 59 y.o. male right handed , owns a [employer] business.  Worker's Compensation and legal considerations: none    Chief Complaint   Patient presents with    Hand Pain     Bilateral hand pain      Pain Score:   7    Subjective:     Initial HPI: Patient presents today with concern of bilateral hand pain, numbness, tingling.  Right worse than left.  Reports tingling to be worse in the morning.  Reports decline in his  strength.  Also endorsing middle finger pain and locking for years.    Date of onset: Chronic  Injury: Yes: Comment: Many, remote injuries  Prior Treatment:  800mg Ibuprofen  Contributory history: Cervical radiculopathy C5-C6, History of cervical PHOEBE, MRI 7/2023 with C4-5 and C5-6 pathology (neural foramen narrowing, mild stenosis)    ROS: Review of Systems - General ROS: negative except HPI    Past Medical History:   Diagnosis Date    Back pain, chronic     Cervical spondylosis without myelopathy 03/29/2023    Gastroesophageal reflux disease without esophagitis 06/13/2017    High cholesterol 9/13/2013    Sleep apnea        Past Surgical History:   Procedure Laterality Date    ORTHOPEDIC SURGERY      Broken Sturnum         Current Outpatient Medications   Medication Sig Dispense Refill    meloxicam (MOBIC) 15 MG tablet Take 1 tablet by mouth daily 30 tablet 0    testosterone cypionate (DEPOTESTOTERONE CYPIONATE) 200 MG/ML injection INJECT 1ML I.M.ONCE EVERY 2 WEEKS 6 mL 1    ibuprofen (ADVIL;MOTRIN) 800 MG tablet Take 1 tablet by mouth every 8 hours as needed for Pain 90 tablet 5    omeprazole (PRILOSEC) 20 MG delayed release capsule Take 1 capsule by mouth 2 times daily 180 capsule 3    predniSONE 10 MG (21) TBPK Use as directed (Patient not taking: Reported on 1/2/2025) 1 each 0     Current Facility-Administered Medications   Medication Dose Route Frequency Provider Last Rate Last Admin    lidocaine 1 % injection 1 mL  1 mL Other Once         triamcinolone acetonide

## 2025-01-03 LAB — NONINV COLON CA DNA+OCC BLD SCRN STL QL: NEGATIVE

## 2025-01-06 ENCOUNTER — TELEPHONE (OUTPATIENT)
Facility: CLINIC | Age: 60
End: 2025-01-06

## 2025-01-06 NOTE — TELEPHONE ENCOUNTER
----- Message from Dr. Oli Jaramillo MD sent at 1/3/2025  4:03 PM EST -----  Tell him/her that their  cologuard was normal

## 2025-01-25 DIAGNOSIS — M18.11 PRIMARY OSTEOARTHRITIS OF FIRST CARPOMETACARPAL JOINT OF RIGHT HAND: ICD-10-CM

## 2025-01-25 DIAGNOSIS — M18.12 PRIMARY OSTEOARTHRITIS OF FIRST CARPOMETACARPAL JOINT OF LEFT HAND: ICD-10-CM

## 2025-01-25 RX ORDER — MELOXICAM 15 MG/1
15 TABLET ORAL DAILY
Qty: 30 TABLET | Refills: 0 | Status: SHIPPED | OUTPATIENT
Start: 2025-01-25 | End: 2025-02-24

## 2025-04-22 ENCOUNTER — PROCEDURE VISIT (OUTPATIENT)
Age: 60
End: 2025-04-22

## 2025-04-22 VITALS
RESPIRATION RATE: 18 BRPM | HEIGHT: 77 IN | TEMPERATURE: 97.8 F | BODY MASS INDEX: 31.6 KG/M2 | DIASTOLIC BLOOD PRESSURE: 81 MMHG | SYSTOLIC BLOOD PRESSURE: 125 MMHG | WEIGHT: 267.6 LBS | HEART RATE: 94 BPM

## 2025-04-22 DIAGNOSIS — R20.2 NUMBNESS AND TINGLING IN BOTH HANDS: Primary | ICD-10-CM

## 2025-04-22 DIAGNOSIS — R94.131 ABNORMAL EMG: ICD-10-CM

## 2025-04-22 DIAGNOSIS — R20.0 NUMBNESS AND TINGLING IN BOTH HANDS: Primary | ICD-10-CM

## 2025-04-22 DIAGNOSIS — G56.03 BILATERAL CARPAL TUNNEL SYNDROME: ICD-10-CM

## 2025-04-29 ENCOUNTER — OFFICE VISIT (OUTPATIENT)
Age: 60
End: 2025-04-29

## 2025-04-29 VITALS — HEIGHT: 77 IN | WEIGHT: 267 LBS | BODY MASS INDEX: 31.53 KG/M2

## 2025-04-29 DIAGNOSIS — M18.12 PRIMARY OSTEOARTHRITIS OF FIRST CARPOMETACARPAL JOINT OF LEFT HAND: ICD-10-CM

## 2025-04-29 DIAGNOSIS — G56.03 BILATERAL CARPAL TUNNEL SYNDROME: Primary | ICD-10-CM

## 2025-04-29 DIAGNOSIS — M18.11 PRIMARY OSTEOARTHRITIS OF FIRST CARPOMETACARPAL JOINT OF RIGHT HAND: ICD-10-CM

## 2025-04-29 RX ORDER — LIDOCAINE HYDROCHLORIDE 10 MG/ML
2 INJECTION, SOLUTION INFILTRATION; PERINEURAL ONCE
Status: COMPLETED | OUTPATIENT
Start: 2025-04-29 | End: 2025-04-29

## 2025-04-29 RX ORDER — MELOXICAM 15 MG/1
15 TABLET ORAL DAILY
Qty: 90 TABLET | Refills: 3 | Status: SHIPPED | OUTPATIENT
Start: 2025-04-29

## 2025-04-29 RX ADMIN — LIDOCAINE HYDROCHLORIDE 2 ML: 10 INJECTION, SOLUTION INFILTRATION; PERINEURAL at 14:03

## 2025-04-29 NOTE — PROGRESS NOTES
Alok Winters is a 59 y.o. male right handed , owns a [employer] business.  Worker's Compensation and legal considerations: none    Chief Complaint   Patient presents with    Follow-up     Bilateral hand     Pain Score:   3    Subjective:     4/29/2025 HPI: Patient presents today for bilateral upper extremity EMG review.  He continues to endorse numbness and tingling about his bilateral hands, especially at nighttime.  Reports previous CMC injections he received in January lasted a few weeks.  He does report meloxicam to be helping.    Initial HPI: Patient presents today with concern of bilateral hand pain, numbness, tingling.  Right worse than left.  Reports tingling to be worse in the morning.  Reports decline in his  strength.  Also endorsing middle finger pain and locking for years.    Date of onset: Chronic  Injury: Yes: Comment: Many, remote injuries  Prior Treatment:  800mg Ibuprofen, bilateral CMC injections  Contributory history: Cervical radiculopathy C5-C6, History of cervical PHOEBE, MRI 7/2023 with C4-5 and C5-6 pathology (neural foramen narrowing, mild stenosis)    ROS: Review of Systems - General ROS: negative except HPI    Past Medical History:   Diagnosis Date    Back pain, chronic     Cervical spondylosis without myelopathy 03/29/2023    Gastroesophageal reflux disease without esophagitis 06/13/2017    High cholesterol 9/13/2013    Sleep apnea        Past Surgical History:   Procedure Laterality Date    ORTHOPEDIC SURGERY      Broken Sturnum         Current Outpatient Medications   Medication Sig Dispense Refill    testosterone cypionate (DEPOTESTOTERONE CYPIONATE) 200 MG/ML injection INJECT 1ML I.M.ONCE EVERY 2 WEEKS 6 mL 1    ibuprofen (ADVIL;MOTRIN) 800 MG tablet Take 1 tablet by mouth every 8 hours as needed for Pain 90 tablet 5    omeprazole (PRILOSEC) 20 MG delayed release capsule Take 1 capsule by mouth 2 times daily 180 capsule 3    meloxicam (MOBIC) 15 MG tablet take 1 tablet by

## 2025-05-24 DIAGNOSIS — E29.1 HYPOGONADISM IN MALE: ICD-10-CM

## 2025-05-27 RX ORDER — OMEPRAZOLE 20 MG/1
20 CAPSULE, DELAYED RELEASE ORAL 2 TIMES DAILY
Qty: 180 CAPSULE | Refills: 3 | Status: SHIPPED | OUTPATIENT
Start: 2025-05-27 | End: 2025-05-27 | Stop reason: SDUPTHER

## 2025-05-27 RX ORDER — OMEPRAZOLE 20 MG/1
20 CAPSULE, DELAYED RELEASE ORAL 2 TIMES DAILY
Qty: 180 CAPSULE | Refills: 3 | OUTPATIENT
Start: 2025-05-27

## 2025-05-27 RX ORDER — OMEPRAZOLE 20 MG/1
20 CAPSULE, DELAYED RELEASE ORAL 2 TIMES DAILY
Qty: 180 CAPSULE | Refills: 3 | Status: SHIPPED | OUTPATIENT
Start: 2025-05-27

## 2025-07-08 RX ORDER — TESTOSTERONE CYPIONATE 200 MG/ML
INJECTION, SOLUTION INTRAMUSCULAR
Qty: 6 ML | Refills: 0 | Status: SHIPPED | OUTPATIENT
Start: 2025-07-08 | End: 2025-07-09 | Stop reason: SDUPTHER

## 2025-07-09 ENCOUNTER — TELEPHONE (OUTPATIENT)
Facility: CLINIC | Age: 60
End: 2025-07-09

## 2025-07-09 NOTE — TELEPHONE ENCOUNTER
This is the needle for his testosterone injections so they need to call [facility]  it was done by Gabe NICKERSON in the office.

## 2025-07-09 NOTE — TELEPHONE ENCOUNTER
[facility] called to request a change on one of the prescriptions. The script had our address and contact number but was written by a different doctor.     Please review and advise.     Pharmacy is unable to find the 22G syringe in a 1 inch. They are requesting a new script be sent in for 22G for 1.5 inch.     [facility] [address] [place] [address] [id number] [facility]